# Patient Record
Sex: MALE | Race: WHITE | Employment: OTHER | URBAN - METROPOLITAN AREA
[De-identification: names, ages, dates, MRNs, and addresses within clinical notes are randomized per-mention and may not be internally consistent; named-entity substitution may affect disease eponyms.]

---

## 2018-03-26 ENCOUNTER — TELEPHONE (OUTPATIENT)
Dept: FAMILY MEDICINE CLINIC | Age: 58
End: 2018-03-26

## 2018-03-26 NOTE — LETTER
[address] [place] [address] Drive  [address] [facility] [address] [address]  Phone: [phone]  Fax: [phone]    Saskia Bay MD        March 26, 2018     Patient: Evert Jones   YOB: 1960   Date of Visit: 3/26/2018       To Whom It May Concern: It is my medical opinion that Evert Jones requires a disability parking placard for the following reasons:  He cannot walk without assistance from another person or the use of an assistance device (cane, crutch, prosthetic device, wheelchair, etc.). He has limited walking ability due to an arthritic and an orthopedic condition. Duration of need: 3 years    If you have any questions or concerns, please don't hesitate to call.     Sincerely,        Saskia Bay MD

## 2018-05-04 ENCOUNTER — OFFICE VISIT (OUTPATIENT)
Dept: FAMILY MEDICINE CLINIC | Age: 58
End: 2018-05-04
Payer: MEDICARE

## 2018-05-04 VITALS
TEMPERATURE: 98 F | DIASTOLIC BLOOD PRESSURE: 92 MMHG | WEIGHT: 220 LBS | HEIGHT: 75 IN | OXYGEN SATURATION: 95 % | BODY MASS INDEX: 27.35 KG/M2 | RESPIRATION RATE: 18 BRPM | HEART RATE: 88 BPM | SYSTOLIC BLOOD PRESSURE: 130 MMHG

## 2018-05-04 DIAGNOSIS — J01.90 ACUTE SINUSITIS, RECURRENCE NOT SPECIFIED, UNSPECIFIED LOCATION: ICD-10-CM

## 2018-05-04 DIAGNOSIS — Z76.0 MEDICATION REFILL: ICD-10-CM

## 2018-05-04 DIAGNOSIS — K12.2 UVULITIS: Primary | ICD-10-CM

## 2018-05-04 LAB — S PYO AG THROAT QL: NORMAL

## 2018-05-04 PROCEDURE — 4004F PT TOBACCO SCREEN RCVD TLK: CPT | Performed by: PHYSICIAN ASSISTANT

## 2018-05-04 PROCEDURE — 99213 OFFICE O/P EST LOW 20 MIN: CPT | Performed by: PHYSICIAN ASSISTANT

## 2018-05-04 PROCEDURE — G8427 DOCREV CUR MEDS BY ELIG CLIN: HCPCS | Performed by: PHYSICIAN ASSISTANT

## 2018-05-04 PROCEDURE — 87880 STREP A ASSAY W/OPTIC: CPT | Performed by: PHYSICIAN ASSISTANT

## 2018-05-04 PROCEDURE — G8417 CALC BMI ABV UP PARAM F/U: HCPCS | Performed by: PHYSICIAN ASSISTANT

## 2018-05-04 PROCEDURE — 3017F COLORECTAL CA SCREEN DOC REV: CPT | Performed by: PHYSICIAN ASSISTANT

## 2018-05-04 RX ORDER — PREDNISONE 10 MG/1
TABLET ORAL
Qty: 20 TABLET | Refills: 0 | Status: SHIPPED | OUTPATIENT
Start: 2018-05-04 | End: 2018-05-14

## 2018-05-04 RX ORDER — AMOXICILLIN AND CLAVULANATE POTASSIUM 875; 125 MG/1; MG/1
1 TABLET, FILM COATED ORAL 2 TIMES DAILY
Qty: 20 TABLET | Refills: 0 | Status: SHIPPED | OUTPATIENT
Start: 2018-05-04 | End: 2018-05-14

## 2018-05-04 RX ORDER — LANSOPRAZOLE 30 MG/1
30 CAPSULE, DELAYED RELEASE ORAL DAILY
Qty: 60 CAPSULE | Refills: 0 | Status: SHIPPED | OUTPATIENT
Start: 2018-05-04 | End: 2018-05-21 | Stop reason: SDUPTHER

## 2018-05-21 ENCOUNTER — OFFICE VISIT (OUTPATIENT)
Dept: FAMILY MEDICINE CLINIC | Age: 58
End: 2018-05-21
Payer: MEDICARE

## 2018-05-21 ENCOUNTER — HOSPITAL ENCOUNTER (OUTPATIENT)
Age: 58
Discharge: HOME | End: 2018-05-23
Payer: MEDICARE

## 2018-05-21 VITALS
BODY MASS INDEX: 27.87 KG/M2 | OXYGEN SATURATION: 97 % | TEMPERATURE: 97.7 F | WEIGHT: 223 LBS | SYSTOLIC BLOOD PRESSURE: 130 MMHG | HEART RATE: 80 BPM | DIASTOLIC BLOOD PRESSURE: 82 MMHG

## 2018-05-21 DIAGNOSIS — Z11.4 ENCOUNTER FOR SCREENING FOR HIV: ICD-10-CM

## 2018-05-21 DIAGNOSIS — E03.4 HYPOTHYROIDISM DUE TO ACQUIRED ATROPHY OF THYROID: ICD-10-CM

## 2018-05-21 DIAGNOSIS — N40.0 BENIGN PROSTATIC HYPERPLASIA WITHOUT LOWER URINARY TRACT SYMPTOMS: ICD-10-CM

## 2018-05-21 DIAGNOSIS — I10 ESSENTIAL HYPERTENSION: ICD-10-CM

## 2018-05-21 DIAGNOSIS — Z12.5 ENCOUNTER FOR SCREENING FOR MALIGNANT NEOPLASM OF PROSTATE: ICD-10-CM

## 2018-05-21 DIAGNOSIS — I10 ESSENTIAL HYPERTENSION: Primary | ICD-10-CM

## 2018-05-21 DIAGNOSIS — F41.9 ANXIETY: ICD-10-CM

## 2018-05-21 DIAGNOSIS — Z11.59 NEED FOR HEPATITIS C SCREENING TEST: ICD-10-CM

## 2018-05-21 DIAGNOSIS — J30.1 CHRONIC SEASONAL ALLERGIC RHINITIS DUE TO POLLEN: ICD-10-CM

## 2018-05-21 DIAGNOSIS — N39.43 DRIBBLING OF URINE: ICD-10-CM

## 2018-05-21 LAB
ALBUMIN SERPL-MCNC: 4.4 G/DL (ref 3.5–5.2)
ALP BLD-CCNC: 88 U/L (ref 40–129)
ALT SERPL-CCNC: 21 U/L (ref 0–40)
ANION GAP SERPL CALCULATED.3IONS-SCNC: 14 MMOL/L (ref 7–16)
AST SERPL-CCNC: 23 U/L (ref 0–39)
BASOPHILS ABSOLUTE: 0.04 E9/L (ref 0–0.2)
BASOPHILS RELATIVE PERCENT: 0.6 % (ref 0–2)
BILIRUB SERPL-MCNC: 0.4 MG/DL (ref 0–1.2)
BUN BLDV-MCNC: 18 MG/DL (ref 6–20)
CALCIUM SERPL-MCNC: 9.3 MG/DL (ref 8.6–10.2)
CHLORIDE BLD-SCNC: 104 MMOL/L (ref 98–107)
CHOLESTEROL, TOTAL: 170 MG/DL (ref 0–199)
CO2: 23 MMOL/L (ref 22–29)
CREAT SERPL-MCNC: 0.9 MG/DL (ref 0.7–1.2)
EOSINOPHILS ABSOLUTE: 0.1 E9/L (ref 0.05–0.5)
EOSINOPHILS RELATIVE PERCENT: 1.4 % (ref 0–6)
GFR AFRICAN AMERICAN: >60
GFR NON-AFRICAN AMERICAN: >60 ML/MIN/1.73
GLUCOSE BLD-MCNC: 96 MG/DL (ref 74–109)
HCT VFR BLD CALC: 45.2 % (ref 37–54)
HDLC SERPL-MCNC: 62 MG/DL
HEMOGLOBIN: 15.4 G/DL (ref 12.5–16.5)
IMMATURE GRANULOCYTES #: 0.04 E9/L
IMMATURE GRANULOCYTES %: 0.6 % (ref 0–5)
LDL CHOLESTEROL CALCULATED: 90 MG/DL (ref 0–99)
LYMPHOCYTES ABSOLUTE: 1.17 E9/L (ref 1.5–4)
LYMPHOCYTES RELATIVE PERCENT: 16.9 % (ref 20–42)
MCH RBC QN AUTO: 28.9 PG (ref 26–35)
MCHC RBC AUTO-ENTMCNC: 34.1 % (ref 32–34.5)
MCV RBC AUTO: 85 FL (ref 80–99.9)
MONOCYTES ABSOLUTE: 0.53 E9/L (ref 0.1–0.95)
MONOCYTES RELATIVE PERCENT: 7.7 % (ref 2–12)
NEUTROPHILS ABSOLUTE: 5.04 E9/L (ref 1.8–7.3)
NEUTROPHILS RELATIVE PERCENT: 72.8 % (ref 43–80)
PDW BLD-RTO: 14 FL (ref 11.5–15)
PLATELET # BLD: 187 E9/L (ref 130–450)
PMV BLD AUTO: 11.9 FL (ref 7–12)
POTASSIUM SERPL-SCNC: 4.7 MMOL/L (ref 3.5–5)
PROSTATE SPECIFIC ANTIGEN: 2.73 NG/ML (ref 0–4)
RBC # BLD: 5.32 E12/L (ref 3.8–5.8)
SODIUM BLD-SCNC: 141 MMOL/L (ref 132–146)
TOTAL PROTEIN: 7.4 G/DL (ref 6.4–8.3)
TRIGL SERPL-MCNC: 90 MG/DL (ref 0–149)
TSH SERPL DL<=0.05 MIU/L-ACNC: 4.66 UIU/ML (ref 0.27–4.2)
VLDLC SERPL CALC-MCNC: 18 MG/DL
WBC # BLD: 6.9 E9/L (ref 4.5–11.5)

## 2018-05-21 PROCEDURE — 84443 ASSAY THYROID STIM HORMONE: CPT

## 2018-05-21 PROCEDURE — 80061 LIPID PANEL: CPT

## 2018-05-21 PROCEDURE — 85025 COMPLETE CBC W/AUTO DIFF WBC: CPT

## 2018-05-21 PROCEDURE — G8417 CALC BMI ABV UP PARAM F/U: HCPCS | Performed by: FAMILY MEDICINE

## 2018-05-21 PROCEDURE — G8427 DOCREV CUR MEDS BY ELIG CLIN: HCPCS | Performed by: FAMILY MEDICINE

## 2018-05-21 PROCEDURE — 86703 HIV-1/HIV-2 1 RESULT ANTBDY: CPT

## 2018-05-21 PROCEDURE — 3017F COLORECTAL CA SCREEN DOC REV: CPT | Performed by: FAMILY MEDICINE

## 2018-05-21 PROCEDURE — 36415 COLL VENOUS BLD VENIPUNCTURE: CPT | Performed by: FAMILY MEDICINE

## 2018-05-21 PROCEDURE — 4004F PT TOBACCO SCREEN RCVD TLK: CPT | Performed by: FAMILY MEDICINE

## 2018-05-21 PROCEDURE — 99214 OFFICE O/P EST MOD 30 MIN: CPT | Performed by: FAMILY MEDICINE

## 2018-05-21 PROCEDURE — G0103 PSA SCREENING: HCPCS

## 2018-05-21 PROCEDURE — 80053 COMPREHEN METABOLIC PANEL: CPT

## 2018-05-21 PROCEDURE — 86803 HEPATITIS C AB TEST: CPT

## 2018-05-21 RX ORDER — LANSOPRAZOLE 15 MG/1
15 CAPSULE, DELAYED RELEASE ORAL DAILY
Qty: 90 CAPSULE | Refills: 1 | Status: SHIPPED | OUTPATIENT
Start: 2018-05-21 | End: 2018-12-04 | Stop reason: SDUPTHER

## 2018-05-21 RX ORDER — LEVOTHYROXINE SODIUM 112 UG/1
TABLET ORAL
Qty: 90 TABLET | Refills: 1 | Status: SHIPPED | OUTPATIENT
Start: 2018-05-21 | End: 2018-12-04 | Stop reason: SDUPTHER

## 2018-05-21 RX ORDER — FLUTICASONE PROPIONATE 50 MCG
1 SPRAY, SUSPENSION (ML) NASAL DAILY
Qty: 3 BOTTLE | Refills: 0 | Status: SHIPPED | OUTPATIENT
Start: 2018-05-21 | End: 2019-07-22 | Stop reason: SDUPTHER

## 2018-05-21 RX ORDER — PRAVASTATIN SODIUM 20 MG
TABLET ORAL
Qty: 90 TABLET | Refills: 1 | Status: SHIPPED | OUTPATIENT
Start: 2018-05-21 | End: 2018-12-04 | Stop reason: SDUPTHER

## 2018-05-21 RX ORDER — TAMSULOSIN HYDROCHLORIDE 0.4 MG/1
CAPSULE ORAL
Qty: 90 CAPSULE | Refills: 1 | Status: SHIPPED | OUTPATIENT
Start: 2018-05-21 | End: 2018-12-04 | Stop reason: SDUPTHER

## 2018-05-21 RX ORDER — LISINOPRIL 30 MG/1
TABLET ORAL
Qty: 90 TABLET | Refills: 1 | Status: SHIPPED | OUTPATIENT
Start: 2018-05-21 | End: 2018-12-04 | Stop reason: SDUPTHER

## 2018-05-22 DIAGNOSIS — R76.8 POSITIVE HEPATITIS C ANTIBODY TEST: Primary | ICD-10-CM

## 2018-05-22 LAB
HEPATITIS C ANTIBODY INTERPRETATION: REACTIVE
HIV-1 AND HIV-2 ANTIBODIES: NORMAL

## 2018-05-25 ENCOUNTER — NURSE ONLY (OUTPATIENT)
Dept: FAMILY MEDICINE CLINIC | Age: 58
End: 2018-05-25
Payer: MEDICARE

## 2018-05-25 ENCOUNTER — HOSPITAL ENCOUNTER (OUTPATIENT)
Age: 58
Discharge: HOME | End: 2018-05-27
Payer: MEDICARE

## 2018-05-25 DIAGNOSIS — R76.8 POSITIVE HEPATITIS C ANTIBODY TEST: Primary | ICD-10-CM

## 2018-05-25 DIAGNOSIS — R76.8 POSITIVE HEPATITIS C ANTIBODY TEST: ICD-10-CM

## 2018-05-25 PROCEDURE — 87522 HEPATITIS C REVRS TRNSCRPJ: CPT

## 2018-05-25 PROCEDURE — 36415 COLL VENOUS BLD VENIPUNCTURE: CPT | Performed by: FAMILY MEDICINE

## 2018-05-28 LAB
EER HCV RNA QNT PCR: NORMAL
HCV RNA QNT REAL-TIME PCR INTERP: NOT DETECTED
HCV RNA, QUANTITATIVE REAL TIME PCR: <1.2 LOG IU
HEPATITIS C RNA PCR QUANT: <15 IU/ML

## 2018-11-20 ENCOUNTER — OFFICE VISIT (OUTPATIENT)
Dept: FAMILY MEDICINE CLINIC | Age: 58
End: 2018-11-20
Payer: MEDICARE

## 2018-11-20 VITALS
DIASTOLIC BLOOD PRESSURE: 84 MMHG | TEMPERATURE: 97.7 F | SYSTOLIC BLOOD PRESSURE: 120 MMHG | HEIGHT: 73 IN | HEART RATE: 91 BPM | BODY MASS INDEX: 29.03 KG/M2 | OXYGEN SATURATION: 98 % | WEIGHT: 219 LBS

## 2018-11-20 DIAGNOSIS — F33.0 MILD EPISODE OF RECURRENT MAJOR DEPRESSIVE DISORDER (HCC): ICD-10-CM

## 2018-11-20 DIAGNOSIS — I10 ESSENTIAL HYPERTENSION: ICD-10-CM

## 2018-11-20 DIAGNOSIS — M48.02 FORAMINAL STENOSIS OF CERVICAL REGION: Primary | ICD-10-CM

## 2018-11-20 DIAGNOSIS — Z13.1 DIABETES MELLITUS SCREENING: ICD-10-CM

## 2018-11-20 DIAGNOSIS — F17.210 CIGARETTE NICOTINE DEPENDENCE WITHOUT COMPLICATION: ICD-10-CM

## 2018-11-20 DIAGNOSIS — Z13.31 POSITIVE DEPRESSION SCREENING: ICD-10-CM

## 2018-11-20 PROCEDURE — G0009 ADMIN PNEUMOCOCCAL VACCINE: HCPCS | Performed by: FAMILY MEDICINE

## 2018-11-20 PROCEDURE — 3017F COLORECTAL CA SCREEN DOC REV: CPT | Performed by: FAMILY MEDICINE

## 2018-11-20 PROCEDURE — 4004F PT TOBACCO SCREEN RCVD TLK: CPT | Performed by: FAMILY MEDICINE

## 2018-11-20 PROCEDURE — G8417 CALC BMI ABV UP PARAM F/U: HCPCS | Performed by: FAMILY MEDICINE

## 2018-11-20 PROCEDURE — G8427 DOCREV CUR MEDS BY ELIG CLIN: HCPCS | Performed by: FAMILY MEDICINE

## 2018-11-20 PROCEDURE — 90732 PPSV23 VACC 2 YRS+ SUBQ/IM: CPT | Performed by: FAMILY MEDICINE

## 2018-11-20 PROCEDURE — G8431 POS CLIN DEPRES SCRN F/U DOC: HCPCS | Performed by: FAMILY MEDICINE

## 2018-11-20 PROCEDURE — G8484 FLU IMMUNIZE NO ADMIN: HCPCS | Performed by: FAMILY MEDICINE

## 2018-11-20 PROCEDURE — 99214 OFFICE O/P EST MOD 30 MIN: CPT | Performed by: FAMILY MEDICINE

## 2018-11-20 ASSESSMENT — PATIENT HEALTH QUESTIONNAIRE - PHQ9
SUM OF ALL RESPONSES TO PHQ QUESTIONS 1-9: 13
8. MOVING OR SPEAKING SO SLOWLY THAT OTHER PEOPLE COULD HAVE NOTICED. OR THE OPPOSITE, BEING SO FIGETY OR RESTLESS THAT YOU HAVE BEEN MOVING AROUND A LOT MORE THAN USUAL: 2
SUM OF ALL RESPONSES TO PHQ9 QUESTIONS 1 & 2: 5
SUM OF ALL RESPONSES TO PHQ QUESTIONS 1-9: 13
10. IF YOU CHECKED OFF ANY PROBLEMS, HOW DIFFICULT HAVE THESE PROBLEMS MADE IT FOR YOU TO DO YOUR WORK, TAKE CARE OF THINGS AT HOME, OR GET ALONG WITH OTHER PEOPLE: 1
7. TROUBLE CONCENTRATING ON THINGS, SUCH AS READING THE NEWSPAPER OR WATCHING TELEVISION: 2
2. FEELING DOWN, DEPRESSED OR HOPELESS: 3
6. FEELING BAD ABOUT YOURSELF - OR THAT YOU ARE A FAILURE OR HAVE LET YOURSELF OR YOUR FAMILY DOWN: 0
5. POOR APPETITE OR OVEREATING: 0
4. FEELING TIRED OR HAVING LITTLE ENERGY: 2
3. TROUBLE FALLING OR STAYING ASLEEP: 2
1. LITTLE INTEREST OR PLEASURE IN DOING THINGS: 2
9. THOUGHTS THAT YOU WOULD BE BETTER OFF DEAD, OR OF HURTING YOURSELF: 0

## 2018-11-20 NOTE — PATIENT INSTRUCTIONS
Patient Education          pneumococcal polysaccharides vaccine (PPSV), 23-valent  Pronunciation:  KACI ARCINIEGA al TINO ee TIBURCIO mishra 23-SYLVIA treadwell  Brand:  Pneumovax 23  What is the most important information I should know about this vaccine? PPSV should be given at least 2 weeks before the start of any treatment that can weaken your immune system. PPSV is also given at least 2 weeks before you undergo a splenectomy (surgical removal of the spleen). The timing of this vaccination is very important for it to be effective. Follow your doctor's instructions. You can still receive a vaccine if you have a cold or fever. In the case of a more severe illness with a fever or any type of infection, wait until you get better before receiving this vaccine. You should not receive a booster vaccine if you had a life-threatening allergic reaction after the first shot. Keep track of any and all side effects you have after receiving this vaccine. If you ever need to receive a booster dose, you will need to tell your doctor if the previous shot caused any side effects. Becoming infected with pneumococcal disease (such as pneumonia or meningitis) is much more dangerous to your health than receiving this vaccine. However, like any medicine, this vaccine can cause side effects but the risk of serious side effects is extremely low. What is pneumococcal polysaccharides vaccine (PPSV)? Pneumococcal disease is a serious infection caused by a bacteria. Pneumococcal bacteria can infect the sinuses and inner ear. It can also infect the lungs, blood, and brain and these conditions can be fatal.  Pneumococcal polysaccharides vaccine (PPSV) is used to prevent infection caused by pneumococcal bacteria. PPSV contains 23 of the most common types of pneumococcal bacteria. PPSV works by exposing you to a small dose of the bacteria or a protein from the bacteria, which causes your body to develop immunity to the disease.  PPSV will not treat known whether PPSV passes into breast milk or if it could harm a nursing baby. Do not use this medication without telling your doctor if you are breast-feeding a baby. How is this vaccine given? PPSV is given as an injection (shot) under the skin or into a muscle of your arm or thigh. You will receive this injection in a doctor's office or other clinic setting. PPSV is usually given as a routine vaccination in adults who are 72 years and older. PPSV may also be given to people between the ages 3and 59years old who have:  · heart disease, lung disease, or diabetes;  · a cerebrospinal fluid leak, or a cochlear implant (an electronic hearing device);  · alcoholism or liver disease (including cirrhosis);  · sickle cell disease or a disorder of the spleen;  · a weak immune system caused by HIV, AIDS, cancer, kidney failure, organ transplantation, or a damaged spleen; or  · a weak immune system caused by taking steroids or receiving chemotherapy or radiation treatment. PPSV may also be given to people between the ages 23and 59years old who smoke or have asthma. PPSV should be given at least 2 weeks before the start of any treatment that can weaken your immune system. PPSV is also given at least 2 weeks before you undergo a splenectomy (surgical removal of the spleen). The timing of this vaccination is very important for it to be effective. Follow your doctor's instructions. Your doctor may recommend treating fever and pain with an aspirin-free pain reliever such as acetaminophen (Tylenol) or ibuprofen (Motrin, Advil, and others) when the shot is given and for the next 24 hours. Follow the label directions or your doctor's instructions about how much of this medicine to take. If your doctor has prescribed an antibiotic (such as penicillin) to help prevent infection with pneumococcal bacteria, do not stop using the antibiotic after you receive the PPSV.  Take the antibiotic for the entire length of time or adverse effects. If you have questions about the drugs you are taking, check with your doctor, nurse or pharmacist.  Copyright 3288-4973 [address]. Version: 5.02. Revision date: 10/17/2012. Care instructions adapted under license by [facility] ([facility]). If you have questions about a medical condition or this instruction, always ask your healthcare professional. [place] [address] any warranty or liability for your use of this information.

## 2018-12-04 ENCOUNTER — TELEPHONE (OUTPATIENT)
Dept: FAMILY MEDICINE CLINIC | Age: 58
End: 2018-12-04

## 2018-12-04 ENCOUNTER — APPOINTMENT (OUTPATIENT)
Dept: GENERAL RADIOLOGY | Age: 58
End: 2018-12-04
Payer: MEDICARE

## 2018-12-04 ENCOUNTER — APPOINTMENT (OUTPATIENT)
Dept: CT IMAGING | Age: 58
End: 2018-12-04
Payer: MEDICARE

## 2018-12-04 ENCOUNTER — HOSPITAL ENCOUNTER (EMERGENCY)
Age: 58
Discharge: HOME | End: 2018-12-04
Attending: EMERGENCY MEDICINE
Payer: MEDICARE

## 2018-12-04 VITALS
RESPIRATION RATE: 16 BRPM | SYSTOLIC BLOOD PRESSURE: 135 MMHG | OXYGEN SATURATION: 95 % | HEIGHT: 73 IN | DIASTOLIC BLOOD PRESSURE: 83 MMHG | HEART RATE: 77 BPM | BODY MASS INDEX: 28.63 KG/M2 | WEIGHT: 216 LBS | TEMPERATURE: 97.7 F

## 2018-12-04 DIAGNOSIS — I10 ESSENTIAL HYPERTENSION: ICD-10-CM

## 2018-12-04 DIAGNOSIS — S09.90XA INJURY OF HEAD, INITIAL ENCOUNTER: Primary | ICD-10-CM

## 2018-12-04 DIAGNOSIS — E03.4 HYPOTHYROIDISM DUE TO ACQUIRED ATROPHY OF THYROID: ICD-10-CM
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
## 2018-12-04 DIAGNOSIS — S22.32XA CLOSED FRACTURE OF ONE RIB OF LEFT SIDE, INITIAL ENCOUNTER: ICD-10-CM

## 2018-12-04 PROCEDURE — 72131 CT LUMBAR SPINE W/O DYE: CPT

## 2018-12-04 PROCEDURE — 99284 EMERGENCY DEPT VISIT MOD MDM: CPT

## 2018-12-04 PROCEDURE — 71101 X-RAY EXAM UNILAT RIBS/CHEST: CPT

## 2018-12-04 PROCEDURE — 70450 CT HEAD/BRAIN W/O DYE: CPT

## 2018-12-04 PROCEDURE — 6370000000 HC RX 637 (ALT 250 FOR IP): Performed by: EMERGENCY MEDICINE

## 2018-12-04 PROCEDURE — 72125 CT NECK SPINE W/O DYE: CPT

## 2018-12-04 PROCEDURE — 6360000002 HC RX W HCPCS: Performed by: EMERGENCY MEDICINE

## 2018-12-04 RX ORDER — ONDANSETRON 4 MG/1
4 TABLET, ORALLY DISINTEGRATING ORAL ONCE
Status: COMPLETED | OUTPATIENT
Start: 2018-12-04 | End: 2018-12-04

## 2018-12-04 RX ORDER — OXYCODONE HYDROCHLORIDE AND ACETAMINOPHEN 5; 325 MG/1; MG/1
1 TABLET ORAL ONCE
Status: COMPLETED | OUTPATIENT
Start: 2018-12-04 | End: 2018-12-04

## 2018-12-04 RX ORDER — LIDOCAINE 4 G/G
1 PATCH TOPICAL DAILY
Status: DISCONTINUED | OUTPATIENT
Start: 2018-12-04 | End: 2018-12-04 | Stop reason: HOSPADM

## 2018-12-04 RX ORDER — LIDOCAINE 50 MG/G
1 PATCH TOPICAL DAILY
Qty: 15 PATCH | Refills: 0 | Status: SHIPPED | OUTPATIENT
Start: 2018-12-04 | End: 2019-01-03

## 2018-12-04 RX ORDER — OXYCODONE HYDROCHLORIDE AND ACETAMINOPHEN 5; 325 MG/1; MG/1
1 TABLET ORAL EVERY 6 HOURS PRN
Qty: 12 TABLET | Refills: 0 | Status: SHIPPED | OUTPATIENT
Start: 2018-12-04 | End: 2018-12-07

## 2018-12-04 RX ORDER — ONDANSETRON 4 MG/1
4 TABLET, ORALLY DISINTEGRATING ORAL EVERY 8 HOURS PRN
Qty: 10 TABLET | Refills: 0 | Status: SHIPPED | OUTPATIENT
Start: 2018-12-04 | End: 2019-12-04

## 2018-12-04 RX ADMIN — ONDANSETRON 4 MG: 4 TABLET, ORALLY DISINTEGRATING ORAL at 12:10

## 2018-12-04 RX ADMIN — OXYCODONE HYDROCHLORIDE AND ACETAMINOPHEN 1 TABLET: 5; 325 TABLET ORAL at 12:10

## 2018-12-04 ASSESSMENT — PAIN DESCRIPTION - LOCATION
LOCATION: BACK;HEAD
LOCATION: BACK;HEAD
LOCATION_2: BACK
LOCATION: RIB CAGE

## 2018-12-04 ASSESSMENT — PAIN DESCRIPTION - PAIN TYPE
TYPE: ACUTE PAIN

## 2018-12-04 ASSESSMENT — PAIN SCALES - GENERAL
PAINLEVEL_OUTOF10: 6
PAINLEVEL_OUTOF10: 9

## 2018-12-04 NOTE — ED PROVIDER NOTES
allergies. -------------------------------------------------- RESULTS -------------------------------------------------  All laboratory and radiology results have been personally reviewed by myself   LABS:  No results found for this visit on 12/04/18. RADIOLOGY:  Interpreted by Radiologist.  CT Cervical Spine WO Contrast   Final Result   No evidence of fracture or dislocation of cervical spine. There is   mild diffuse degenerative changes from C3 to T1 with osteophytes and   multilevel disc bulges. Previous anterior spinal fusion at C5-C6 is   noted. CT Lumbar Spine WO Contrast   Final Result   No acute fracture. There is diffuse degenerative changes in the lumbar   spine. At L4-5, there is a central and right paracentral disc herniation with   effacement of the right lateral recess, right neural foramina, and   effacement of right L4 nerve root sheath. There is mild central disc   bulge at L5-S1 as well. CT Head WO Contrast   Final Result   Negative noncontrast CT study. Specifically, there is no evidence of intracranial hemorrhage or mass   effect, and no calvarial traumatic findings are noted. CT       XR RIBS LEFT INCLUDE CHEST (MIN 3 VIEWS)   Final Result   Findings suspicious for left eighth rib fracture.           ------------------------- NURSING NOTES AND VITALS REVIEWED ---------------------------   The nursing notes within the ED encounter and vital signs as below have been reviewed.    BP (!) 149/87   Pulse 78   Temp 97.7 °F (36.5 °C) (Oral)   Resp 16   Ht 6' 1\" (1.854 m)   Wt 216 lb (98 kg)   SpO2 95%   BMI 28.50 kg/m²   Oxygen Saturation Interpretation: Normal      ---------------------------------------------------PHYSICAL EXAM--------------------------------------      Constitutional/General: Alert and oriented x3, well appearing, non toxic in NAD  Head: NC/AT  Eyes: PERRL, EOMI  Mouth: Oropharynx clear, handling secretions, no

## 2018-12-06 RX ORDER — TAMSULOSIN HYDROCHLORIDE 0.4 MG/1
CAPSULE ORAL
Qty: 90 CAPSULE | Refills: 1 | Status: SHIPPED | OUTPATIENT
Start: 2018-12-06 | End: 2019-06-27 | Stop reason: SDUPTHER

## 2018-12-06 RX ORDER — LANSOPRAZOLE 15 MG/1
CAPSULE, DELAYED RELEASE ORAL
Qty: 90 CAPSULE | Refills: 1 | Status: SHIPPED | OUTPATIENT
Start: 2018-12-06 | End: 2019-06-27 | Stop reason: SDUPTHER

## 2018-12-06 RX ORDER — LEVOTHYROXINE SODIUM 112 UG/1
TABLET ORAL
Qty: 90 TABLET | Refills: 1 | Status: SHIPPED | OUTPATIENT
Start: 2018-12-06 | End: 2019-06-27 | Stop reason: SDUPTHER

## 2018-12-06 RX ORDER — LISINOPRIL 30 MG/1
TABLET ORAL
Qty: 90 TABLET | Refills: 1 | Status: SHIPPED | OUTPATIENT
Start: 2018-12-06 | End: 2019-04-22

## 2018-12-06 RX ORDER — PRAVASTATIN SODIUM 20 MG
TABLET ORAL
Qty: 90 TABLET | Refills: 1 | Status: SHIPPED | OUTPATIENT
Start: 2018-12-06 | End: 2019-06-27 | Stop reason: SDUPTHER
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
## 2019-04-01 ENCOUNTER — TELEPHONE (OUTPATIENT)
Dept: FAMILY MEDICINE CLINIC | Age: 59
End: 2019-04-01

## 2019-04-01 NOTE — TELEPHONE ENCOUNTER
Patient phoned c/o coughing, cold, fever, chills, nausea and throwing up - patient states he thinks he has the flu or pneumonia. Advised patient- [facility] Offered patient Tuesday appt at 4:20pm, he declined and states he will go to [facility] in [place].

## 2019-04-04 ENCOUNTER — TELEPHONE (OUTPATIENT)
Dept: ENT CLINIC | Age: 59
End: 2019-04-04

## 2019-04-04 NOTE — TELEPHONE ENCOUNTER
Patient is scheduled on 06-19-19 for bilateral ear cerumen, states he was seen in [facility] at a [facility]) facility. Patient states he used to see Dr. Pino Diaz and is ears are abnormal (they go a different way then their suppose to). Patient requested to be added to the waitlist, can be seen at either location. Patient can be reached at [phone].

## 2019-04-22 ENCOUNTER — TELEPHONE (OUTPATIENT)
Dept: FAMILY MEDICINE CLINIC | Age: 59
End: 2019-04-22

## 2019-04-22 ENCOUNTER — OFFICE VISIT (OUTPATIENT)
Dept: ENT CLINIC | Age: 59
End: 2019-04-22
Payer: MEDICARE

## 2019-04-22 VITALS
WEIGHT: 217 LBS | DIASTOLIC BLOOD PRESSURE: 90 MMHG | BODY MASS INDEX: 27.85 KG/M2 | HEIGHT: 74 IN | SYSTOLIC BLOOD PRESSURE: 139 MMHG | HEART RATE: 69 BPM

## 2019-04-22 DIAGNOSIS — H61.23 BILATERAL IMPACTED CERUMEN: Primary | ICD-10-CM

## 2019-04-22 DIAGNOSIS — J30.1 SEASONAL ALLERGIC RHINITIS DUE TO POLLEN: ICD-10-CM

## 2019-04-22 PROCEDURE — 4004F PT TOBACCO SCREEN RCVD TLK: CPT | Performed by: OTOLARYNGOLOGY

## 2019-04-22 PROCEDURE — G8417 CALC BMI ABV UP PARAM F/U: HCPCS | Performed by: OTOLARYNGOLOGY

## 2019-04-22 PROCEDURE — 99203 OFFICE O/P NEW LOW 30 MIN: CPT | Performed by: OTOLARYNGOLOGY

## 2019-04-22 PROCEDURE — G8427 DOCREV CUR MEDS BY ELIG CLIN: HCPCS | Performed by: OTOLARYNGOLOGY

## 2019-04-22 PROCEDURE — 3017F COLORECTAL CA SCREEN DOC REV: CPT | Performed by: OTOLARYNGOLOGY

## 2019-04-22 RX ORDER — AMLODIPINE BESYLATE 5 MG/1
5 TABLET ORAL DAILY
Qty: 30 TABLET | Refills: 5 | Status: SHIPPED | OUTPATIENT
Start: 2019-04-22 | End: 2019-11-07 | Stop reason: SDUPTHER

## 2019-04-22 RX ORDER — AZELASTINE 1 MG/ML
1 SPRAY, METERED NASAL 2 TIMES DAILY
Qty: 2 BOTTLE | Refills: 1 | Status: SHIPPED | OUTPATIENT
Start: 2019-04-22 | End: 2019-07-22 | Stop reason: SDUPTHER

## 2019-04-22 ASSESSMENT — ENCOUNTER SYMPTOMS
VOMITING: 0
NAUSEA: 0
SHORTNESS OF BREATH: 0
COLOR CHANGE: 0
EYE REDNESS: 0
COUGH: 0
RHINORRHEA: 1
STRIDOR: 0
EYE DISCHARGE: 0
ALLERGIC/IMMUNOLOGIC NEGATIVE: 1

## 2019-04-22 NOTE — PROGRESS NOTES
Subjective:     Patient ID:  Sajan Mccarty is a 63 y.o. male. HPI:    Cerumen Impaction/Allergic Rhinitis   Patient presents withdiminished hearing both ears for the past 4 years. There is a prior history of cerumen impaction. The patient was notusing ear drops to loosen wax immediately prior to this visit. Patient's medications, allergies, past medical, surgical, socialand family histories were reviewed and updated as appropriate. Last ear cleaning was 4 years ago with Dr. Teri Vasques. Also notes allergies, worse in the spring, currently on an oral antihistamine and Flonase. He notes post-nasal drainage, rhinitis, an watery eyes. Never been formally allergy tested   He also notes uvula swelling that is intermittent, he doesn't think he snores at night, is a smoker. Review of Systems   Constitutional: Negative for chills, fatigue and fever. HENT: Positive for hearing loss, postnasal drip and rhinorrhea. Negative for ear discharge, ear pain and tinnitus. Eyes: Negative for discharge and redness. Respiratory: Negative for cough, shortness of breath and stridor. Gastrointestinal: Negative for nausea and vomiting. Endocrine: Negative. Genitourinary: Negative. Musculoskeletal: Negative. Skin: Negative for color change and rash. Allergic/Immunologic: Negative. Neurological: Negative for dizziness, speech difficulty and headaches. Hematological: Negative. Psychiatric/Behavioral: Negative for agitation and confusion. All other systems reviewed and are negative. Objective:   Physical Exam   Constitutional: He is oriented to person, place, and time. He appears well-developed and well-nourished. HENT:   Head: Normocephalic and atraumatic. Right Ear: Tympanic membrane, external ear and ear canal normal.   Left Ear: Tympanic membrane, external ear and ear canal normal.   Ears:    Nose: Mucosal edema and rhinorrhea present.    Mouth/Throat: Oropharynx is clear and moist. Eyes: Pupils are equal, round, and reactive to light. Neck: Normal range of motion. Cardiovascular: Normal rate. Pulmonary/Chest: Effort normal.   Musculoskeletal: Normal range of motion. Neurological: He is alert and oriented to person, place, and time. Skin: Skin is warm and dry. Cerumen removal     Auditory canal(s) both earscompletely obstructed with cerumen. A microscope was used due to deep impaction of the cerumen. Cerumen was gently removed using soft plastic curette, suction. Tympanic membranes are intact following the procedure. Auditory canals appear normal.                      Assessment:       Diagnosis Orders   1. Bilateral impacted cerumen     2. Seasonal allergic rhinitis due to pollen               Plan:     Cerumen impaction   Will follow up with patient in 3 months the patient has further issues. Discussed H2O2 and irrigation bi-weekly formaintenance. Allergic rhinitis- Astelin to current Flonase and oral antihistamine regiment     Uvula swelling- pt likely snoring, may need surgery in the future. Recommend to discontinue Lisinopril as it may be contributing- angioedema. Pt to call PCP and switch meds. Electronically signed by Merly Pompa DO on 4/22/2019 at 10:15 AM                  Ignacio Goddard  1960      I have discussed the case, including pertinent history and exam findings with the resident. I have seen and examined the patient and the key elements of the encounter have been performed by me. I agree with the assessment, plan and orders as documented by the resident. Patient here for follow up of medical problems. Remainder of medical problems as per resident note.       1635 Alejo Underwood DO  5/4/19

## 2019-05-20 ENCOUNTER — TELEPHONE (OUTPATIENT)
Dept: FAMILY MEDICINE CLINIC | Age: 59
End: 2019-05-20

## 2019-05-20 DIAGNOSIS — E03.4 HYPOTHYROIDISM DUE TO ACQUIRED ATROPHY OF THYROID: Primary | ICD-10-CM

## 2019-05-20 DIAGNOSIS — Z12.5 SCREENING FOR MALIGNANT NEOPLASM OF PROSTATE: ICD-10-CM

## 2019-05-20 NOTE — TELEPHONE ENCOUNTER
Patient phoned, he missed his appt this am. Rescheduled patient for end of June. He would like to get his fasting labs done soon, if  would please place orders, he will have them done at [facility] because it is closer for him.

## 2019-06-19 ENCOUNTER — APPOINTMENT (OUTPATIENT)
Dept: GENERAL RADIOLOGY | Age: 59
DRG: 603 | End: 2019-06-19
Payer: MEDICARE

## 2019-06-19 ENCOUNTER — HOSPITAL ENCOUNTER (INPATIENT)
Age: 59
LOS: 2 days | Discharge: HOME | DRG: 603 | End: 2019-06-21
Attending: EMERGENCY MEDICINE | Admitting: INTERNAL MEDICINE
Payer: MEDICARE

## 2019-06-19 DIAGNOSIS — R10.31 RIGHT LOWER QUADRANT ABDOMINAL PAIN: ICD-10-CM

## 2019-06-19 DIAGNOSIS — T78.3XXA ANGIOEDEMA, INITIAL ENCOUNTER: Primary | ICD-10-CM

## 2019-06-19 PROBLEM — K02.9 DENTAL CARIES: Status: ACTIVE | Noted: 2019-06-19

## 2019-06-19 PROBLEM — F17.200 TOBACCO DEPENDENCE: Status: ACTIVE | Noted: 2019-06-19

## 2019-06-19 PROBLEM — R97.20 ELEVATED PSA: Status: ACTIVE | Noted: 2019-06-19

## 2019-06-19 PROBLEM — R10.9 ABDOMINAL PAIN: Status: ACTIVE | Noted: 2019-06-19

## 2019-06-19 PROBLEM — F10.20 ALCOHOL DEPENDENCE (HCC): Status: ACTIVE | Noted: 2019-06-19

## 2019-06-19 PROBLEM — R00.1 BRADYCARDIA: Status: ACTIVE | Noted: 2019-06-19

## 2019-06-19 PROBLEM — E78.5 HLD (HYPERLIPIDEMIA): Status: ACTIVE | Noted: 2019-06-19

## 2019-06-19 LAB
ABO/RH: NORMAL
ALBUMIN SERPL-MCNC: 4.3 G/DL (ref 3.5–5.2)
ALP BLD-CCNC: 80 U/L (ref 40–129)
ALT SERPL-CCNC: 10 U/L (ref 0–40)
ANION GAP SERPL CALCULATED.3IONS-SCNC: 14 MMOL/L (ref 7–16)
ANTIBODY SCREEN: NORMAL
AST SERPL-CCNC: 18 U/L (ref 0–39)
BASOPHILS ABSOLUTE: 0.05 E9/L (ref 0–0.2)
BASOPHILS RELATIVE PERCENT: 0.6 % (ref 0–2)
BILIRUB SERPL-MCNC: 0.7 MG/DL (ref 0–1.2)
BILIRUBIN URINE: NEGATIVE
BLOOD, URINE: NEGATIVE
BUN BLDV-MCNC: 20 MG/DL (ref 6–20)
C-REACTIVE PROTEIN: 1.5 MG/DL (ref 0–0.4)
CALCIUM SERPL-MCNC: 9.3 MG/DL (ref 8.6–10.2)
CHLORIDE BLD-SCNC: 103 MMOL/L (ref 98–107)
CLARITY: CLEAR
CO2: 24 MMOL/L (ref 22–29)
COLOR: YELLOW
CREAT SERPL-MCNC: 1.2 MG/DL (ref 0.7–1.2)
D DIMER: 820 NG/ML DDU
EOSINOPHILS ABSOLUTE: 0.18 E9/L (ref 0.05–0.5)
EOSINOPHILS RELATIVE PERCENT: 2 % (ref 0–6)
GFR AFRICAN AMERICAN: >60
GFR NON-AFRICAN AMERICAN: >60 ML/MIN/1.73
GLUCOSE BLD-MCNC: 75 MG/DL (ref 74–99)
GLUCOSE URINE: NEGATIVE MG/DL
HCT VFR BLD CALC: 43.5 % (ref 37–54)
HEMOGLOBIN: 15.1 G/DL (ref 12.5–16.5)
IMMATURE GRANULOCYTES #: 0.05 E9/L
IMMATURE GRANULOCYTES %: 0.6 % (ref 0–5)
KETONES, URINE: 15 MG/DL
LEUKOCYTE ESTERASE, URINE: NEGATIVE
LIPASE: 6 U/L (ref 13–60)
LYMPHOCYTES ABSOLUTE: 1.54 E9/L (ref 1.5–4)
LYMPHOCYTES RELATIVE PERCENT: 17.2 % (ref 20–42)
MCH RBC QN AUTO: 29.2 PG (ref 26–35)
MCHC RBC AUTO-ENTMCNC: 34.7 % (ref 32–34.5)
MCV RBC AUTO: 84 FL (ref 80–99.9)
MONOCYTES ABSOLUTE: 0.56 E9/L (ref 0.1–0.95)
MONOCYTES RELATIVE PERCENT: 6.3 % (ref 2–12)
NEUTROPHILS ABSOLUTE: 6.55 E9/L (ref 1.8–7.3)
NEUTROPHILS RELATIVE PERCENT: 73.3 % (ref 43–80)
NITRITE, URINE: NEGATIVE
PDW BLD-RTO: 13.8 FL (ref 11.5–15)
PH UA: 5.5 (ref 5–9)
PLATELET # BLD: 196 E9/L (ref 130–450)
PMV BLD AUTO: 12.4 FL (ref 7–12)
POTASSIUM REFLEX MAGNESIUM: 4.6 MMOL/L (ref 3.5–5)
PROSTATE SPECIFIC ANTIGEN: 1.48 NG/ML (ref 0–4)
PROTEIN UA: NEGATIVE MG/DL
RBC # BLD: 5.18 E12/L (ref 3.8–5.8)
SODIUM BLD-SCNC: 141 MMOL/L (ref 132–146)
SPECIFIC GRAVITY UA: 1.01 (ref 1–1.03)
T4 FREE: 1.1 NG/DL (ref 0.93–1.7)
TOTAL PROTEIN: 7.1 G/DL (ref 6.4–8.3)
TROPONIN: <0.01 NG/ML (ref 0–0.03)
TSH SERPL DL<=0.05 MIU/L-ACNC: 1.05 UIU/ML (ref 0.27–4.2)
UROBILINOGEN, URINE: 0.2 E.U./DL
WBC # BLD: 8.9 E9/L (ref 4.5–11.5)

## 2019-06-19 PROCEDURE — 81003 URINALYSIS AUTO W/O SCOPE: CPT

## 2019-06-19 PROCEDURE — 71045 X-RAY EXAM CHEST 1 VIEW: CPT

## 2019-06-19 PROCEDURE — 85651 RBC SED RATE NONAUTOMATED: CPT

## 2019-06-19 PROCEDURE — 36415 COLL VENOUS BLD VENIPUNCTURE: CPT

## 2019-06-19 PROCEDURE — 87081 CULTURE SCREEN ONLY: CPT

## 2019-06-19 PROCEDURE — 2580000003 HC RX 258: Performed by: PHYSICIAN ASSISTANT

## 2019-06-19 PROCEDURE — 93005 ELECTROCARDIOGRAM TRACING: CPT | Performed by: EMERGENCY MEDICINE

## 2019-06-19 PROCEDURE — 96375 TX/PRO/DX INJ NEW DRUG ADDON: CPT

## 2019-06-19 PROCEDURE — 83690 ASSAY OF LIPASE: CPT

## 2019-06-19 PROCEDURE — 82103 ALPHA-1-ANTITRYPSIN TOTAL: CPT

## 2019-06-19 PROCEDURE — 85025 COMPLETE CBC W/AUTO DIFF WBC: CPT

## 2019-06-19 PROCEDURE — 85378 FIBRIN DEGRADE SEMIQUANT: CPT

## 2019-06-19 PROCEDURE — G0103 PSA SCREENING: HCPCS

## 2019-06-19 PROCEDURE — 84443 ASSAY THYROID STIM HORMONE: CPT

## 2019-06-19 PROCEDURE — 84484 ASSAY OF TROPONIN QUANT: CPT

## 2019-06-19 PROCEDURE — 99285 EMERGENCY DEPT VISIT HI MDM: CPT

## 2019-06-19 PROCEDURE — 6360000002 HC RX W HCPCS: Performed by: EMERGENCY MEDICINE

## 2019-06-19 PROCEDURE — 80053 COMPREHEN METABOLIC PANEL: CPT

## 2019-06-19 PROCEDURE — 86140 C-REACTIVE PROTEIN: CPT

## 2019-06-19 PROCEDURE — 2000000000 HC ICU R&B

## 2019-06-19 PROCEDURE — G0378 HOSPITAL OBSERVATION PER HR: HCPCS

## 2019-06-19 PROCEDURE — 2500000003 HC RX 250 WO HCPCS: Performed by: PHYSICIAN ASSISTANT

## 2019-06-19 PROCEDURE — 86901 BLOOD TYPING SEROLOGIC RH(D): CPT

## 2019-06-19 PROCEDURE — 86850 RBC ANTIBODY SCREEN: CPT

## 2019-06-19 PROCEDURE — 84145 PROCALCITONIN (PCT): CPT

## 2019-06-19 PROCEDURE — 84439 ASSAY OF FREE THYROXINE: CPT

## 2019-06-19 PROCEDURE — 86900 BLOOD TYPING SEROLOGIC ABO: CPT

## 2019-06-19 RX ORDER — ACETAMINOPHEN 325 MG/1
650 TABLET ORAL EVERY 4 HOURS PRN
Status: DISCONTINUED | OUTPATIENT
Start: 2019-06-19 | End: 2019-06-21 | Stop reason: HOSPADM

## 2019-06-19 RX ORDER — DIPHENHYDRAMINE HYDROCHLORIDE 50 MG/ML
50 INJECTION INTRAMUSCULAR; INTRAVENOUS EVERY 6 HOURS
Status: COMPLETED | OUTPATIENT
Start: 2019-06-20 | End: 2019-06-20

## 2019-06-19 RX ORDER — METHYLPREDNISOLONE SODIUM SUCCINATE 125 MG/2ML
125 INJECTION, POWDER, LYOPHILIZED, FOR SOLUTION INTRAMUSCULAR; INTRAVENOUS ONCE
Status: DISCONTINUED | OUTPATIENT
Start: 2019-06-19 | End: 2019-06-19

## 2019-06-19 RX ORDER — METHYLPREDNISOLONE SODIUM SUCCINATE 40 MG/ML
40 INJECTION, POWDER, LYOPHILIZED, FOR SOLUTION INTRAMUSCULAR; INTRAVENOUS EVERY 6 HOURS
Status: DISCONTINUED | OUTPATIENT
Start: 2019-06-20 | End: 2019-06-20

## 2019-06-19 RX ORDER — METHYLPREDNISOLONE SODIUM SUCCINATE 125 MG/2ML
125 INJECTION, POWDER, LYOPHILIZED, FOR SOLUTION INTRAMUSCULAR; INTRAVENOUS ONCE
Status: COMPLETED | OUTPATIENT
Start: 2019-06-19 | End: 2019-06-19

## 2019-06-19 RX ORDER — 0.9 % SODIUM CHLORIDE 0.9 %
1000 INTRAVENOUS SOLUTION INTRAVENOUS ONCE
Status: COMPLETED | OUTPATIENT
Start: 2019-06-19 | End: 2019-06-19

## 2019-06-19 RX ORDER — DIPHENHYDRAMINE HYDROCHLORIDE 50 MG/ML
25 INJECTION INTRAMUSCULAR; INTRAVENOUS ONCE
Status: DISCONTINUED | OUTPATIENT
Start: 2019-06-19 | End: 2019-06-19 | Stop reason: DRUGHIGH

## 2019-06-19 RX ORDER — FLUTICASONE PROPIONATE 50 MCG
1 SPRAY, SUSPENSION (ML) NASAL DAILY
Status: DISCONTINUED | OUTPATIENT
Start: 2019-06-20 | End: 2019-06-21 | Stop reason: HOSPADM

## 2019-06-19 RX ORDER — LEVOTHYROXINE SODIUM 112 UG/1
112 TABLET ORAL
Status: DISCONTINUED | OUTPATIENT
Start: 2019-06-20 | End: 2019-06-21 | Stop reason: HOSPADM

## 2019-06-19 RX ORDER — CETIRIZINE HYDROCHLORIDE 10 MG/1
10 TABLET ORAL DAILY
Status: DISCONTINUED | OUTPATIENT
Start: 2019-06-19 | End: 2019-06-19

## 2019-06-19 RX ORDER — DEXAMETHASONE SODIUM PHOSPHATE 10 MG/ML
10 INJECTION INTRAMUSCULAR; INTRAVENOUS ONCE
Status: DISCONTINUED | OUTPATIENT
Start: 2019-06-19 | End: 2019-06-19 | Stop reason: DRUGHIGH

## 2019-06-19 RX ORDER — CLONAZEPAM 0.5 MG/1
1 TABLET ORAL 3 TIMES DAILY
Status: DISCONTINUED | OUTPATIENT
Start: 2019-06-20 | End: 2019-06-20

## 2019-06-19 RX ORDER — PRAVASTATIN SODIUM 20 MG
20 TABLET ORAL DAILY
Status: DISCONTINUED | OUTPATIENT
Start: 2019-06-20 | End: 2019-06-21 | Stop reason: HOSPADM

## 2019-06-19 RX ORDER — AMLODIPINE BESYLATE 5 MG/1
5 TABLET ORAL DAILY
Status: DISCONTINUED | OUTPATIENT
Start: 2019-06-20 | End: 2019-06-21 | Stop reason: HOSPADM

## 2019-06-19 RX ORDER — DIPHENHYDRAMINE HYDROCHLORIDE 50 MG/ML
50 INJECTION INTRAMUSCULAR; INTRAVENOUS ONCE
Status: COMPLETED | OUTPATIENT
Start: 2019-06-19 | End: 2019-06-19

## 2019-06-19 RX ORDER — FLUOXETINE 10 MG/1
30 TABLET, FILM COATED ORAL DAILY
Status: DISCONTINUED | OUTPATIENT
Start: 2019-06-20 | End: 2019-06-21 | Stop reason: HOSPADM

## 2019-06-19 RX ORDER — METHYLPREDNISOLONE SODIUM SUCCINATE 125 MG/2ML
INJECTION, POWDER, LYOPHILIZED, FOR SOLUTION INTRAMUSCULAR; INTRAVENOUS
Status: DISPENSED
Start: 2019-06-19 | End: 2019-06-20

## 2019-06-19 RX ADMIN — METHYLPREDNISOLONE SODIUM SUCCINATE 125 MG: 125 INJECTION, POWDER, FOR SOLUTION INTRAMUSCULAR; INTRAVENOUS at 18:32

## 2019-06-19 RX ADMIN — SODIUM CHLORIDE 1000 ML: 9 INJECTION, SOLUTION INTRAVENOUS at 18:32

## 2019-06-19 RX ADMIN — DIPHENHYDRAMINE HYDROCHLORIDE 50 MG: 50 INJECTION, SOLUTION INTRAMUSCULAR; INTRAVENOUS at 18:32

## 2019-06-19 RX ADMIN — FAMOTIDINE 20 MG: 10 INJECTION INTRAVENOUS at 18:32

## 2019-06-19 ASSESSMENT — PAIN DESCRIPTION - LOCATION
LOCATION: NECK;JAW
LOCATION: JAW;LEG

## 2019-06-19 ASSESSMENT — PAIN SCALES - GENERAL
PAINLEVEL_OUTOF10: 7
PAINLEVEL_OUTOF10: 6

## 2019-06-19 ASSESSMENT — PAIN DESCRIPTION - PAIN TYPE
TYPE: ACUTE PAIN
TYPE: ACUTE PAIN

## 2019-06-19 ASSESSMENT — PAIN DESCRIPTION - DESCRIPTORS: DESCRIPTORS: DULL

## 2019-06-19 NOTE — ED PROVIDER NOTES
Department of Emergency Medicine   ED  Provider Note  Admit Date/RoomTime: 6/19/2019  6:19 PM  ED Room: 21/21      History of Present Illness:  6/19/19, Time: 6:31 PM         Elham Prather is a 63 y.o. male presenting to the ED for angioedema, beginning 4-5 hours ago. The complaint has been constant, moderate in severity, and worsened by nothing. Pt states he woke early this morning with a tingling sensation in his lips and left side of face, but went back to bed. However, 4-5 hours ago he woke back up with swelling to the upper lip. Swelling has improved PTA. Pt was recently taken off lisinopril three months ago due to cough and was placed on amlodipine. Currently, he complains of slight dysphagia beginning 4-5 hours ago. Patient also RLQ abdominal pain beginning two months ago. He has not undergone any work up of his abdominal pain. No fevers, vomiting, or diarrhea. Tolerating po. He does not have a personal nor family history of angioedema/anaphylaxis. Pt denies any new foods, shampoos, or being stung by a bee. He denies any allergies. No recent medication changes. Pt denies CP, SOB, diaphoresis, neck/arm pain, dizziness, fever, chills, headache, or any other symptoms at this time. Review of Systems:   Pertinent positives and negatives are stated within HPI, all other systems reviewed and are negative.    --------------------------------------------- PAST HISTORY ---------------------------------------------  Past Medical History:  has a past medical history of Anxiety, Arthritis, Depression, GERD (gastroesophageal reflux disease), Hepatitis C, HLD (hyperlipidemia), Hypertension, and Thyroid disease. Past Surgical History:  has a past surgical history that includes Colonoscopy; Upper gastrointestinal endoscopy; hernia repair; Ankle fracture surgery (Right, [id number]); Ankle fracture surgery (Right, 01/19/2015);  Hardware Removal (Right, 7/10/2015); repair retinal tear/hole (Bilateral); other surgical listed below.      LABS:  Results for orders placed or performed during the hospital encounter of 06/19/19   CBC Auto Differential   Result Value Ref Range    WBC 8.9 4.5 - 11.5 E9/L    RBC 5.18 3.80 - 5.80 E12/L    Hemoglobin 15.1 12.5 - 16.5 g/dL    Hematocrit 43.5 37.0 - 54.0 %    MCV 84.0 80.0 - 99.9 fL    MCH 29.2 26.0 - 35.0 pg    MCHC 34.7 (H) 32.0 - 34.5 %    RDW 13.8 11.5 - 15.0 fL    Platelets 800 749 - 328 E9/L    MPV 12.4 (H) 7.0 - 12.0 fL    Neutrophils % 73.3 43.0 - 80.0 %    Immature Granulocytes % 0.6 0.0 - 5.0 %    Lymphocytes % 17.2 (L) 20.0 - 42.0 %    Monocytes % 6.3 2.0 - 12.0 %    Eosinophils % 2.0 0.0 - 6.0 %    Basophils % 0.6 0.0 - 2.0 %    Neutrophils # 6.55 1.80 - 7.30 E9/L    Immature Granulocytes # 0.05 E9/L    Lymphocytes # 1.54 1.50 - 4.00 E9/L    Monocytes # 0.56 0.10 - 0.95 E9/L    Eosinophils # 0.18 0.05 - 0.50 E9/L    Basophils # 0.05 0.00 - 0.20 E9/L   Comprehensive Metabolic Panel w/ Reflex to MG   Result Value Ref Range    Sodium 141 132 - 146 mmol/L    Potassium reflex Magnesium 4.6 3.5 - 5.0 mmol/L    Chloride 103 98 - 107 mmol/L    CO2 24 22 - 29 mmol/L    Anion Gap 14 7 - 16 mmol/L    Glucose 75 74 - 99 mg/dL    BUN 20 6 - 20 mg/dL    CREATININE 1.2 0.7 - 1.2 mg/dL    GFR Non-African American >60 >=60 mL/min/1.73    GFR African American >60     Calcium 9.3 8.6 - 10.2 mg/dL    Total Protein 7.1 6.4 - 8.3 g/dL    Alb 4.3 3.5 - 5.2 g/dL    Total Bilirubin 0.7 0.0 - 1.2 mg/dL    Alkaline Phosphatase 80 40 - 129 U/L    ALT 10 0 - 40 U/L    AST 18 0 - 39 U/L   Lipase   Result Value Ref Range    Lipase 6 (L) 13 - 60 U/L   Urinalysis, reflex to microscopic   Result Value Ref Range    Color, UA Yellow Straw/Yellow    Clarity, UA Clear Clear    Glucose, Ur Negative Negative mg/dL    Bilirubin Urine Negative Negative    Ketones, Urine 15 (A) Negative mg/dL    Specific Gravity, UA 1.010 1.005 - 1.030    Blood, Urine Negative Negative    pH, UA 5.5 5.0 - 9.0    Protein, UA Negative Negative mg/dL    Urobilinogen, Urine 0.2 <2.0 E.U./dL    Nitrite, Urine Negative Negative    Leukocyte Esterase, Urine Negative Negative   EKG 12 Lead   Result Value Ref Range    Ventricular Rate 51 BPM    Atrial Rate 51 BPM    P-R Interval 174 ms    QRS Duration 84 ms    Q-T Interval 472 ms    QTc Calculation (Bazett) 435 ms    P Axis 37 degrees    R Axis -13 degrees    T Axis 20 degrees   TYPE AND SCREEN   Result Value Ref Range    ABO/Rh O POS     Antibody Screen NEG        RADIOLOGY:  Interpreted by Radiologist.  XR CHEST PORTABLE   Final Result   No acute cardiopulmonary findings. ------------------------- NURSING NOTES AND VITALS REVIEWED ---------------------------   The nursing notes within the ED encounter and vital signs as below have been reviewed by myself. BP (!) 151/86   Pulse (!) 49   Temp 97.7 °F (36.5 °C) (Tympanic)   Resp 18   Ht 6' 1.5\" (1.867 m)   Wt 217 lb (98.4 kg)   SpO2 95%   BMI 28.24 kg/m²   Oxygen Saturation Interpretation: Abnormal    The patients available past medical records and past encounters were reviewed. ------------------------------ ED COURSE/MEDICAL DECISION MAKING----------------------  Medications   famotidine (PEPCID) injection 20 mg (20 mg Intravenous Given 6/19/19 1832)   0.9 % sodium chloride bolus (0 mLs Intravenous Stopped 6/19/19 2008)   methylPREDNISolone sodium (SOLU-MEDROL) injection 125 mg (125 mg Intravenous Given 6/19/19 1832)   diphenhydrAMINE (BENADRYL) injection 50 mg (50 mg Intravenous Given 6/19/19 1832)     Medical Decision Making:     Patient is a 28-year-old male presenting with angioedema. He maintained his airway throughout his ED course, and he did not require intubation. He was given recommendation IV Solu-Medrol, Benadryl, and Pepcid with minimal improvement of the angioedema. He had no worsening of the angioedema, so FFP was not given. Screening labs and chest x-ray unremarkable.   Spoke with Dr. Jesusita Mcdaniel who will admit to the MICU. Patient was also complaining of chronic abdominal pain which has been present for 2 months. He had right lower quadrant tenderness but no rebound or guarding. I did not want to obtain them imaging in the ED and send the patient to CT in the event of worsening angioedema, so I will defer further work-up of his abdominal pain to the inpatient doctor. Patient was monitored closely on the cardiac monitor throughout his ED course. ED Course as of Jun 19 2234 Wed Jun 19, 2019   1834 I obtained verbal consent for blood products as needed. Patient maintaining airway with no swelling of his posterior pharynx. No stridor or trismus. Will order type and screen for FFP and hold but not transfuse unless worsening symptoms. [JA]   2005 On re-evaluation, patient resting comfortably in NAD. No progression of angioedema. No posterior pharyngeal swelling. No stridor or trismus. No tongue swelling. No soft palate elevation. Handling secretions. Normal voice. Lungs clear. Intubation not indicated at this time. Will continue to monitor. [JA]   2018 EKG: This EKG is signed and interpreted by me. Rate: 51  Rhythm: Sinus  Interpretation: NSR  Comparison: no previous EKG available      [JA]   2056 Patient's exam unchanged. No need for intubation at this time. Will admit to ICU for close monitoring. [JA]   2103 Spoke with Sound. Will admit. [JA]      ED Course User Index  [JA] Jose Maria Delarosa MD     Critical Care:     Please note that the withdrawal or failure to initiate urgent interventions for this patient would likely result in a life threatening deterioration or permanent disability. Accordingly this patient received 30 minutes of critical care time, excluding separately billable procedures.     This patient's ED course included: a personal history and physicial examination, re-evaluation prior to disposition, IV medications, cardiac monitoring and continuous pulse oximetry    Counseling: The emergency provider has spoken with the patient and discussed todays results, in addition to providing specific details for the plan of care and counseling regarding the diagnosis and prognosis. Questions are answered at this time and they are agreeable with the plan.     --------------------------------- IMPRESSION AND DISPOSITION ---------------------------------    IMPRESSION  1. Angioedema, initial encounter    2. Right lower quadrant abdominal pain        DISPOSITION  Disposition: Admit to CCU/ICU  Patient condition is stable    6/19/19, 6:31 PM.    This note is prepared by Brenna Fisher, acting as Scribe for Cindy Brown MD.    Cindy Brown MD:  The scribe's documentation has been prepared under my direction and personally reviewed by me in its entirety. I confirm that the note above accurately reflects all work, treatment, procedures, and medical decision making performed by me.        Cindy Brown MD  06/19/19 3297

## 2019-06-19 NOTE — ED NOTES
FIRST PROVIDER CONTACT ASSESSMENT NOTE      Department of Emergency Medicine   Admit Date: No admission date for patient encounter. Chief Complaint: Angioedema (was taken off his lisinopril 3 months ago, woke up today with facial swelling. )      History of Present Illness:    Kp Reyes is a 63 y.o. male who presents to the ED for evaluation of left upper lip swelling and facial swelling onset this morning on awakening. He went to sleep last night and had some tingling to the left side of his lip and then awoke this morning with swelling to his left upper lip and left side of his face. He reports no fever or chills.   He feels like his throat is tight.          -----------------END OF FIRST PROVIDER CONTACT ASSESSMENT NOTE--------------  Electronically signed by Staci Saucedo PA-C   DD: 6/19/19               Staci Saucedo PA-C  06/19/19 1815

## 2019-06-20 ENCOUNTER — APPOINTMENT (OUTPATIENT)
Dept: ULTRASOUND IMAGING | Age: 59
DRG: 603 | End: 2019-06-20
Payer: MEDICARE

## 2019-06-20 ENCOUNTER — APPOINTMENT (OUTPATIENT)
Dept: GENERAL RADIOLOGY | Age: 59
DRG: 603 | End: 2019-06-20
Payer: MEDICARE

## 2019-06-20 LAB
ALBUMIN SERPL-MCNC: 4 G/DL (ref 3.5–5.2)
ALP BLD-CCNC: 73 U/L (ref 40–129)
ALT SERPL-CCNC: 10 U/L (ref 0–40)
AMPHETAMINE SCREEN, URINE: NOT DETECTED
ANION GAP SERPL CALCULATED.3IONS-SCNC: 14 MMOL/L (ref 7–16)
ANISOCYTOSIS: ABNORMAL
AST SERPL-CCNC: 16 U/L (ref 0–39)
BACTERIA: ABNORMAL /HPF
BARBITURATE SCREEN URINE: NOT DETECTED
BASOPHILS ABSOLUTE: 0 E9/L (ref 0–0.2)
BASOPHILS RELATIVE PERCENT: 0.2 % (ref 0–2)
BENZODIAZEPINE SCREEN, URINE: POSITIVE
BILIRUB SERPL-MCNC: 0.6 MG/DL (ref 0–1.2)
BILIRUBIN URINE: ABNORMAL
BLOOD, URINE: NEGATIVE
BUN BLDV-MCNC: 21 MG/DL (ref 6–20)
C4 COMPLEMENT: 20 MG/DL (ref 10–40)
CALCIUM SERPL-MCNC: 8.8 MG/DL (ref 8.6–10.2)
CANNABINOID SCREEN URINE: POSITIVE
CHLORIDE BLD-SCNC: 101 MMOL/L (ref 98–107)
CHOLESTEROL, TOTAL: 180 MG/DL (ref 0–199)
CLARITY: CLEAR
CO2: 23 MMOL/L (ref 22–29)
COCAINE METABOLITE SCREEN URINE: NOT DETECTED
COLOR: YELLOW
CREAT SERPL-MCNC: 1 MG/DL (ref 0.7–1.2)
EKG ATRIAL RATE: 51 BPM
EKG P AXIS: 37 DEGREES
EKG P-R INTERVAL: 174 MS
EKG Q-T INTERVAL: 472 MS
EKG QRS DURATION: 84 MS
EKG QTC CALCULATION (BAZETT): 435 MS
EKG R AXIS: -13 DEGREES
EKG T AXIS: 20 DEGREES
EKG VENTRICULAR RATE: 51 BPM
EOSINOPHILS ABSOLUTE: 0 E9/L (ref 0.05–0.5)
EOSINOPHILS RELATIVE PERCENT: 0 % (ref 0–6)
GFR AFRICAN AMERICAN: >60
GFR NON-AFRICAN AMERICAN: >60 ML/MIN/1.73
GLUCOSE BLD-MCNC: 163 MG/DL (ref 74–99)
GLUCOSE URINE: NEGATIVE MG/DL
HCT VFR BLD CALC: 42.8 % (ref 37–54)
HDLC SERPL-MCNC: 68 MG/DL
HEMOGLOBIN: 14.7 G/DL (ref 12.5–16.5)
KETONES, URINE: 40 MG/DL
LDL CHOLESTEROL CALCULATED: 102 MG/DL (ref 0–99)
LEUKOCYTE ESTERASE, URINE: NEGATIVE
LYMPHOCYTES ABSOLUTE: 0.52 E9/L (ref 1.5–4)
LYMPHOCYTES RELATIVE PERCENT: 7.9 % (ref 20–42)
MAGNESIUM: 2.2 MG/DL (ref 1.6–2.6)
MCH RBC QN AUTO: 28.8 PG (ref 26–35)
MCHC RBC AUTO-ENTMCNC: 34.3 % (ref 32–34.5)
MCV RBC AUTO: 83.9 FL (ref 80–99.9)
METHADONE SCREEN, URINE: NOT DETECTED
MONOCYTES ABSOLUTE: 0 E9/L (ref 0.1–0.95)
MONOCYTES RELATIVE PERCENT: 0.9 % (ref 2–12)
NEUTROPHILS ABSOLUTE: 5.98 E9/L (ref 1.8–7.3)
NEUTROPHILS RELATIVE PERCENT: 92.1 % (ref 43–80)
NITRITE, URINE: NEGATIVE
OPIATE SCREEN URINE: NOT DETECTED
PDW BLD-RTO: 13.5 FL (ref 11.5–15)
PH UA: 5.5 (ref 5–9)
PHENCYCLIDINE SCREEN URINE: NOT DETECTED
PLATELET # BLD: 186 E9/L (ref 130–450)
PMV BLD AUTO: 11.9 FL (ref 7–12)
POLYCHROMASIA: ABNORMAL
POTASSIUM SERPL-SCNC: 4.7 MMOL/L (ref 3.5–5)
PROCALCITONIN: 0.08 NG/ML (ref 0–0.08)
PROPOXYPHENE SCREEN: NOT DETECTED
PROTEIN UA: NEGATIVE MG/DL
RBC # BLD: 5.1 E12/L (ref 3.8–5.8)
RBC UA: ABNORMAL /HPF (ref 0–2)
SEDIMENTATION RATE, ERYTHROCYTE: 6 MM/HR (ref 0–15)
SODIUM BLD-SCNC: 138 MMOL/L (ref 132–146)
SPECIFIC GRAVITY UA: 1.02 (ref 1–1.03)
TOTAL PROTEIN: 6.8 G/DL (ref 6.4–8.3)
TRIGL SERPL-MCNC: 48 MG/DL (ref 0–149)
TROPONIN: <0.01 NG/ML (ref 0–0.03)
UROBILINOGEN, URINE: 0.2 E.U./DL
VLDLC SERPL CALC-MCNC: 10 MG/DL
WBC # BLD: 6.5 E9/L (ref 4.5–11.5)
WBC UA: ABNORMAL /HPF (ref 0–5)

## 2019-06-20 PROCEDURE — 2500000003 HC RX 250 WO HCPCS: Performed by: INTERNAL MEDICINE

## 2019-06-20 PROCEDURE — 96365 THER/PROPH/DIAG IV INF INIT: CPT

## 2019-06-20 PROCEDURE — 2580000003 HC RX 258: Performed by: INTERNAL MEDICINE

## 2019-06-20 PROCEDURE — 96375 TX/PRO/DX INJ NEW DRUG ADDON: CPT

## 2019-06-20 PROCEDURE — 6370000000 HC RX 637 (ALT 250 FOR IP): Performed by: INTERNAL MEDICINE

## 2019-06-20 PROCEDURE — 6360000002 HC RX W HCPCS: Performed by: INTERNAL MEDICINE

## 2019-06-20 PROCEDURE — G0480 DRUG TEST DEF 1-7 CLASSES: HCPCS

## 2019-06-20 PROCEDURE — 93010 ELECTROCARDIOGRAM REPORT: CPT | Performed by: INTERNAL MEDICINE

## 2019-06-20 PROCEDURE — 1200000000 HC SEMI PRIVATE

## 2019-06-20 PROCEDURE — 85025 COMPLETE CBC W/AUTO DIFF WBC: CPT

## 2019-06-20 PROCEDURE — 83735 ASSAY OF MAGNESIUM: CPT

## 2019-06-20 PROCEDURE — 99222 1ST HOSP IP/OBS MODERATE 55: CPT | Performed by: INTERNAL MEDICINE

## 2019-06-20 PROCEDURE — 81001 URINALYSIS AUTO W/SCOPE: CPT

## 2019-06-20 PROCEDURE — 2500000003 HC RX 250 WO HCPCS: Performed by: FAMILY MEDICINE

## 2019-06-20 PROCEDURE — 6370000000 HC RX 637 (ALT 250 FOR IP): Performed by: FAMILY MEDICINE

## 2019-06-20 PROCEDURE — G0378 HOSPITAL OBSERVATION PER HR: HCPCS

## 2019-06-20 PROCEDURE — APPSS60 APP SPLIT SHARED TIME 46-60 MINUTES: Performed by: NURSE PRACTITIONER

## 2019-06-20 PROCEDURE — 80307 DRUG TEST PRSMV CHEM ANLYZR: CPT

## 2019-06-20 PROCEDURE — 96376 TX/PRO/DX INJ SAME DRUG ADON: CPT

## 2019-06-20 PROCEDURE — 36415 COLL VENOUS BLD VENIPUNCTURE: CPT

## 2019-06-20 PROCEDURE — 96372 THER/PROPH/DIAG INJ SC/IM: CPT

## 2019-06-20 PROCEDURE — 74018 RADEX ABDOMEN 1 VIEW: CPT

## 2019-06-20 PROCEDURE — 76705 ECHO EXAM OF ABDOMEN: CPT

## 2019-06-20 PROCEDURE — 86160 COMPLEMENT ANTIGEN: CPT

## 2019-06-20 PROCEDURE — 84484 ASSAY OF TROPONIN QUANT: CPT

## 2019-06-20 PROCEDURE — 80061 LIPID PANEL: CPT

## 2019-06-20 PROCEDURE — 80053 COMPREHEN METABOLIC PANEL: CPT

## 2019-06-20 PROCEDURE — 2580000003 HC RX 258: Performed by: FAMILY MEDICINE

## 2019-06-20 PROCEDURE — 96366 THER/PROPH/DIAG IV INF ADDON: CPT

## 2019-06-20 PROCEDURE — 6360000002 HC RX W HCPCS: Performed by: FAMILY MEDICINE

## 2019-06-20 RX ORDER — THIAMINE MONONITRATE (VIT B1) 100 MG
100 TABLET ORAL DAILY
Status: DISCONTINUED | OUTPATIENT
Start: 2019-06-23 | End: 2019-06-21 | Stop reason: HOSPADM

## 2019-06-20 RX ORDER — SODIUM CHLORIDE 0.9 % (FLUSH) 0.9 %
10 SYRINGE (ML) INJECTION EVERY 12 HOURS SCHEDULED
Status: DISCONTINUED | OUTPATIENT
Start: 2019-06-20 | End: 2019-06-20 | Stop reason: SDUPTHER

## 2019-06-20 RX ORDER — THIAMINE HYDROCHLORIDE 100 MG/ML
100 INJECTION, SOLUTION INTRAMUSCULAR; INTRAVENOUS DAILY
Status: DISCONTINUED | OUTPATIENT
Start: 2019-06-20 | End: 2019-06-20 | Stop reason: SDUPTHER

## 2019-06-20 RX ORDER — CLONAZEPAM 0.5 MG/1
1 TABLET ORAL 3 TIMES DAILY
Status: DISCONTINUED | OUTPATIENT
Start: 2019-06-20 | End: 2019-06-21 | Stop reason: HOSPADM

## 2019-06-20 RX ORDER — LORAZEPAM 1 MG/1
1 TABLET ORAL
Status: DISCONTINUED | OUTPATIENT
Start: 2019-06-20 | End: 2019-06-21 | Stop reason: HOSPADM

## 2019-06-20 RX ORDER — SODIUM CHLORIDE 0.9 % (FLUSH) 0.9 %
10 SYRINGE (ML) INJECTION PRN
Status: DISCONTINUED | OUTPATIENT
Start: 2019-06-20 | End: 2019-06-20 | Stop reason: SDUPTHER

## 2019-06-20 RX ORDER — SODIUM CHLORIDE 0.9 % (FLUSH) 0.9 %
10 SYRINGE (ML) INJECTION PRN
Status: DISCONTINUED | OUTPATIENT
Start: 2019-06-20 | End: 2019-06-21 | Stop reason: HOSPADM

## 2019-06-20 RX ORDER — LORAZEPAM 2 MG/ML
3 INJECTION INTRAMUSCULAR
Status: DISCONTINUED | OUTPATIENT
Start: 2019-06-20 | End: 2019-06-21 | Stop reason: HOSPADM

## 2019-06-20 RX ORDER — LORAZEPAM 2 MG/ML
4 INJECTION INTRAMUSCULAR
Status: DISCONTINUED | OUTPATIENT
Start: 2019-06-20 | End: 2019-06-21 | Stop reason: HOSPADM

## 2019-06-20 RX ORDER — LORAZEPAM 1 MG/1
4 TABLET ORAL
Status: DISCONTINUED | OUTPATIENT
Start: 2019-06-20 | End: 2019-06-21 | Stop reason: HOSPADM

## 2019-06-20 RX ORDER — METHYLPREDNISOLONE SODIUM SUCCINATE 40 MG/ML
40 INJECTION, POWDER, LYOPHILIZED, FOR SOLUTION INTRAMUSCULAR; INTRAVENOUS EVERY 12 HOURS
Status: DISCONTINUED | OUTPATIENT
Start: 2019-06-20 | End: 2019-06-21 | Stop reason: HOSPADM

## 2019-06-20 RX ORDER — SODIUM CHLORIDE 0.9 % (FLUSH) 0.9 %
10 SYRINGE (ML) INJECTION EVERY 12 HOURS SCHEDULED
Status: DISCONTINUED | OUTPATIENT
Start: 2019-06-20 | End: 2019-06-21 | Stop reason: HOSPADM

## 2019-06-20 RX ORDER — LORAZEPAM 2 MG/ML
2 INJECTION INTRAMUSCULAR
Status: DISCONTINUED | OUTPATIENT
Start: 2019-06-20 | End: 2019-06-21 | Stop reason: HOSPADM

## 2019-06-20 RX ORDER — ONDANSETRON 2 MG/ML
4 INJECTION INTRAMUSCULAR; INTRAVENOUS EVERY 6 HOURS PRN
Status: DISCONTINUED | OUTPATIENT
Start: 2019-06-20 | End: 2019-06-21 | Stop reason: HOSPADM

## 2019-06-20 RX ORDER — FOLIC ACID 1 MG/1
1 TABLET ORAL DAILY
Status: DISCONTINUED | OUTPATIENT
Start: 2019-06-23 | End: 2019-06-21 | Stop reason: HOSPADM

## 2019-06-20 RX ORDER — LORAZEPAM 1 MG/1
3 TABLET ORAL
Status: DISCONTINUED | OUTPATIENT
Start: 2019-06-20 | End: 2019-06-21 | Stop reason: HOSPADM

## 2019-06-20 RX ORDER — NICOTINE 21 MG/24HR
1 PATCH, TRANSDERMAL 24 HOURS TRANSDERMAL DAILY
Status: DISCONTINUED | OUTPATIENT
Start: 2019-06-20 | End: 2019-06-21 | Stop reason: HOSPADM

## 2019-06-20 RX ORDER — LORAZEPAM 2 MG/ML
1 INJECTION INTRAMUSCULAR
Status: DISCONTINUED | OUTPATIENT
Start: 2019-06-20 | End: 2019-06-21 | Stop reason: HOSPADM

## 2019-06-20 RX ORDER — LORAZEPAM 1 MG/1
2 TABLET ORAL
Status: DISCONTINUED | OUTPATIENT
Start: 2019-06-20 | End: 2019-06-21 | Stop reason: HOSPADM

## 2019-06-20 RX ADMIN — Medication 10 ML: at 09:49

## 2019-06-20 RX ADMIN — CLONAZEPAM 1 MG: 0.5 TABLET ORAL at 00:39

## 2019-06-20 RX ADMIN — FAMOTIDINE 20 MG: 10 INJECTION INTRAVENOUS at 21:00

## 2019-06-20 RX ADMIN — Medication 10 ML: at 21:01

## 2019-06-20 RX ADMIN — CLONAZEPAM 1 MG: 0.5 TABLET ORAL at 08:11

## 2019-06-20 RX ADMIN — FAMOTIDINE 20 MG: 10 INJECTION INTRAVENOUS at 06:13

## 2019-06-20 RX ADMIN — CEFTRIAXONE SODIUM 1 G: 1 INJECTION, POWDER, FOR SOLUTION INTRAMUSCULAR; INTRAVENOUS at 09:37

## 2019-06-20 RX ADMIN — LORAZEPAM 1 MG: 1 TABLET ORAL at 12:41

## 2019-06-20 RX ADMIN — DIPHENHYDRAMINE HYDROCHLORIDE 50 MG: 50 INJECTION, SOLUTION INTRAMUSCULAR; INTRAVENOUS at 00:39

## 2019-06-20 RX ADMIN — Medication 10 ML: at 08:18

## 2019-06-20 RX ADMIN — LEVOTHYROXINE SODIUM 112 MCG: 112 TABLET ORAL at 06:13

## 2019-06-20 RX ADMIN — CLONAZEPAM 1 MG: 0.5 TABLET ORAL at 14:15

## 2019-06-20 RX ADMIN — FLUTICASONE PROPIONATE 1 SPRAY: 50 SPRAY, METERED NASAL at 08:11

## 2019-06-20 RX ADMIN — METHYLPREDNISOLONE SODIUM SUCCINATE 40 MG: 40 INJECTION, POWDER, FOR SOLUTION INTRAMUSCULAR; INTRAVENOUS at 06:13

## 2019-06-20 RX ADMIN — AMLODIPINE BESYLATE 5 MG: 5 TABLET ORAL at 08:11

## 2019-06-20 RX ADMIN — THIAMINE HYDROCHLORIDE: 100 INJECTION, SOLUTION INTRAMUSCULAR; INTRAVENOUS at 02:35

## 2019-06-20 RX ADMIN — ACETAMINOPHEN 650 MG: 325 TABLET, FILM COATED ORAL at 18:05

## 2019-06-20 RX ADMIN — DIPHENHYDRAMINE HYDROCHLORIDE 50 MG: 50 INJECTION, SOLUTION INTRAMUSCULAR; INTRAVENOUS at 06:13

## 2019-06-20 RX ADMIN — PRAVASTATIN SODIUM 20 MG: 20 TABLET ORAL at 08:11

## 2019-06-20 RX ADMIN — METHYLPREDNISOLONE SODIUM SUCCINATE 40 MG: 40 INJECTION, POWDER, FOR SOLUTION INTRAMUSCULAR; INTRAVENOUS at 18:05

## 2019-06-20 RX ADMIN — METHYLPREDNISOLONE SODIUM SUCCINATE 40 MG: 40 INJECTION, POWDER, FOR SOLUTION INTRAMUSCULAR; INTRAVENOUS at 00:39

## 2019-06-20 RX ADMIN — ENOXAPARIN SODIUM 40 MG: 40 INJECTION SUBCUTANEOUS at 08:12

## 2019-06-20 RX ADMIN — METRONIDAZOLE 500 MG: 500 INJECTION, SOLUTION INTRAVENOUS at 09:39

## 2019-06-20 RX ADMIN — FLUOXETINE 30 MG: 10 TABLET, FILM COATED ORAL at 08:11

## 2019-06-20 RX ADMIN — METRONIDAZOLE 500 MG: 500 INJECTION, SOLUTION INTRAVENOUS at 21:00

## 2019-06-20 RX ADMIN — CLONAZEPAM 1 MG: 0.5 TABLET ORAL at 21:00

## 2019-06-20 ASSESSMENT — PAIN SCALES - GENERAL
PAINLEVEL_OUTOF10: 8
PAINLEVEL_OUTOF10: 0
PAINLEVEL_OUTOF10: 0
PAINLEVEL_OUTOF10: 6
PAINLEVEL_OUTOF10: 6
PAINLEVEL_OUTOF10: 0
PAINLEVEL_OUTOF10: 0
PAINLEVEL_OUTOF10: 8
PAINLEVEL_OUTOF10: 4
PAINLEVEL_OUTOF10: 0
PAINLEVEL_OUTOF10: 4

## 2019-06-20 ASSESSMENT — PAIN DESCRIPTION - DESCRIPTORS
DESCRIPTORS: ACHING;DISCOMFORT;SORE
DESCRIPTORS: DULL
DESCRIPTORS: ACHING;DISCOMFORT;SORE;THROBBING

## 2019-06-20 ASSESSMENT — PAIN DESCRIPTION - LOCATION
LOCATION: NECK;JAW
LOCATION: TEETH
LOCATION: TEETH
LOCATION: JAW;NECK

## 2019-06-20 ASSESSMENT — PAIN DESCRIPTION - FREQUENCY: FREQUENCY: CONTINUOUS

## 2019-06-20 ASSESSMENT — PAIN DESCRIPTION - PAIN TYPE
TYPE: ACUTE PAIN

## 2019-06-20 ASSESSMENT — PAIN DESCRIPTION - ONSET: ONSET: ON-GOING

## 2019-06-20 ASSESSMENT — PAIN DESCRIPTION - PROGRESSION: CLINICAL_PROGRESSION: NOT CHANGED

## 2019-06-20 ASSESSMENT — PAIN DESCRIPTION - ORIENTATION
ORIENTATION: LEFT
ORIENTATION: LEFT

## 2019-06-20 NOTE — PATIENT CARE CONFERENCE
P Quality Flow/Interdisciplinary Rounds Progress Note        Quality Flow Rounds held on June 20, 2019    Disciplines Attending:  Bedside Nurse, Charge nurse, PT, and . Samantha Spencer was admitted on 6/19/2019  6:19 PM    Anticipated Discharge Date:  Expected Discharge Date: 06/22/19    Disposition: home    Miguel Score:  Miguel Scale Score: 17    Readmission Risk              Risk of Unplanned Readmission:        13           Discussed patient goal for the day, patient clinical progression, and barriers to discharge. The following Goal(s) of the Day/Commitment(s) have been identified:  Continue to monitor. Possible transfer.        Italo Esposito  June 20, 2019

## 2019-06-20 NOTE — CONSULTS
[address]                  [address] [facility],  [facility]                                  CONSULTATION    PATIENT NAME: Shikha Corral                    :        1960  MED REC NO:   [id number]                            ROOM:       [id number]  ACCOUNT NO:   [de-identified]                           ADMIT DATE: 2019  PROVIDER:     CÉSAR Valdivia     CONSULT DATE:  2019    CONSULTING PROVIDER:  Carla Justin DO    REASON FOR CONSULTATION:  Chest pain and sinus bradycardia. HISTORY OF PRESENT ILLNESS:  The patient is a 15-year-old  male  who was previously seen in an initial inpatient cardiac consultation  with Dr. Alejandro Butler, on 2012 for chest pain, at which time he  underwent a Lexiscan MPS that was nonischemic with no wall motion  abnormality with an EF of 53%. His medical history also includes  hypertension, hyperlipidemia, anxiety and depression, history of  hepatitis C with IV drug abuse in the past, hypothyroidism, GERD, family  history of premature coronary artery disease, and reported frequent  falls attributed to sciatica without syncope. The patient presented to [facility] Emergency Department on  2019 with complaints of facial edema. He states that prior to  presenting to the emergency department the night before, he had gone to  bed \"and I felt like a cold sore was coming on my top lip. \"  He states  the following day, he woke up with significant right mouth and facial  edema. He states over the course of the past six months, he has had a  chipped teeth with mouth soreness. He states that he has had  intermittent drainage from his teeth. He also states over the course of  the past several years, he has been having left-sided chest pain  described as a burning sensation that lasted several hours in duration,  occurs with or without exertion and is unchanged with exertion.   He  denies accompanying dyspnea on exertion, orthopnea and PND, and edema to  bilateral extremities. He states over the course of the past two  months, he has been having right lower quadrant pain without  accompanying diarrhea, constipation, nausea, vomiting, fever, or chills,  and states that his right lower quadrant pain worsens with eating, and  at times, his chest discomfort worsens after he eats. Upon arrival to the emergency department, his vital signs were temporal  temperature 97.7, heart rate 90 beats per minute, respiratory rate 18,  blood pressure 126/83, oxygen saturation 92% on room air. A 12-lead EKG  was obtained that showed sinus bradycardia with a heart rate of 51, WBC  is 8.9, H and H 15.1 and 43.5, BUN/SCR 20/1.2. Urinalysis:  Negative  nitrites and leukocyte esterase. Chest x-ray: Unremarkable. He  received a liter of normal saline, Benadryl 50 mg IV, Solu-Medrol 125 mg  IV, and Pepcid. Abdominal x-ray was also unremarkable. He was admitted to the medical intensive care unit and Cardiology was  consulted by Dr. Karly Brewer for further management of chest pain and sinus  bradycardia. Currently, the patient is sitting up in bed. Denies chest  pain and dyspnea. He states that his facial edema has significantly  lessened. He is still receiving Benadryl and Solu-Medrol. He has had  an additional troponin less than 0.01. Urine toxicology positive for  benzodiazepines and cannabinoids. CRP and sed rate are pending. Abdominal ultrasound is pending. Telemetry monitoring presently shows  sinus rhythm with a heart rate in the 70s, and he is in no acute  distress. PAST MEDICAL/SURGICAL HISTORY:  1. Lexiscan MPS, 06/06/2012:  Nonischemic. No wall motion abnormality. EF 53%. 2.  Hypertension. 3.  Hyperlipidemia. 4.  GERD. 5.  Hypothyroidism, on replacement therapy. 6.  Depression and anxiety. 7.  History of hepatitis C with IV drug abuse. 8.  Family history of premature coronary artery disease.   9. Frequent falls, attributed to sciatica. 10.  Arthritis. 11.  Status post right ankle fracture with surgical repair. 12.  Status post repair of retinal hole/tear. 13.  Status post surgery for pyloric stenosis (further details unknown). 14.  Status post C5-C6 cervical fusion. FAMILY HISTORY:  Mother  from complications of excision of tumor  and hematoma. Father  from complications of pneumonia and COPD. He has a brother who had an initial MI at the age of 61 recently. SOCIAL HISTORY:  Currently, he smokes half a pack of cigarettes a day,  has done so for 15 years. He states that he drinks five beers a week. Denies illicit drug use. Denies any caffeine intake. ALLERGIES:  LISINOPRIL (cough). HOME MEDICATIONS:  Norvasc 5 mg daily, Astelin, vitamin B, Klonopin,  Prozac, Flonase, Prevacid, Synthroid 112 mcg daily, multivitamin,  NicoDerm CQ, Zofran, pravastatin 20 mg daily, Flomax, and trazodone. HOSPITAL MEDICATIONS:  Norvasc 5 mg daily, Rocephin, Klonopin, Benadryl  50 mg IV q.6 hours x2 doses, Lovenox 40 mg daily, Pepcid, Prozac,  Flonase, Folvite, Synthroid 112 mcg daily, Solu-Medrol 40 mg IV q.12  hours, Flagyl, pravastatin 20 mg daily, normal saline and folic acid  with vitamin K at 100 mL per hour, and vitamin B1.    REVIEW OF SYSTEMS:  CONSTITUTIONAL:  Denies fevers, chills, night  sweats, weight loss, and fatigue. HEENT:  Facial edema and lip  numbness. See HPI. Complains of oral pain, possible abscess. Denies  headaches, nosebleeds. MUSCULOSKELETAL:  Denies falls. Complains of  pain to bilateral lower extremities with ambulation. Denies edema to  bilateral lower extremities. NEURO:  Complains of bilateral lower  extremity numbness and tingling and weakness. He states that his legs  give out frequently without syncope. Complains of dizziness,  lightheadedness with quick change of position. CARDIAC:  Chest pain. See HPI.   Complains of intermittent palpitations, feelings of his heart  racing. LUNGS:  Complains of dyspnea on exertion. Denies orthopnea and  PND. GI:  Denies heartburn. Complains of abdominal pain. See HPI. Denies nausea, vomiting, diarrhea, constipation; black, bloody, tarry  stools. :  Denies dysuria and hematuria. HEMATOLOGIC:  Denies  excessive bleeding or bruising. LYMPHS:  Denies lumps or bumps to neck,  axilla, breast, or groin. ENDOCRINE:  Complains of intolerances to heat  with accompanying shortness of breath without chest pain. Denies  intolerances to cold. PSYCHIATRIC:  Complains of anxiety and  depression. PHYSICAL EXAMINATION:  VITAL SIGNS:  Oral temperature 98.1, heart rate 55 beats per minute,  respiratory rate 20, blood pressure 138/94, oxygen saturation 94% on  room air. Weight 209 pounds, BMI 26.9. CONSTITUTIONAL:  In general, this is a well-developed, middle-aged,   male, who appears his stated age, who is awake, alert, and  cooperative, in no apparent distress. HEENT:  Eyes:  Conjunctivae pink. No noted xanthomas. Oral mucosa pink  and moist.  NECK:  No stridor. Symmetrical, nontender. No JVD. CHEST:  Symmetrical, nontender with palpation. No accessory muscle use  or intercostal retractions. Lungs on auscultation, diminished  throughout all lung fields. CARDIOVASCULAR:  No carotid bruit noted. Heart on inspection shows no  noted pulsations. Heart on palpation, no heaves or thrills. PMI  nondisplaced. Heart on auscultation, regular rate and rhythm. No  murmur or rub. PERIPHERAL VASCULAR:  Trace bilateral lower extremity edema. Pedal  pulses are palpable and equal.  No clubbing or cyanosis. ABDOMEN:  Soft, obese. Nontender with palpation. Bowel sounds present  x4 quadrants. No palpable masses. No abdominal bruits or pulsation. MUSCULOSKELETAL:  Good muscle strength and tone. No atrophy or abnormal  movements. :  Deferred. SKIN:  Warm and dry.   No stasis dermatitis or ulcerations. NEUROLOGIC:  Alert and oriented x3. Speech is clear and appropriate. Follows simple commands. Moves all extremities. Pleasant affect. LABS/TEST:  CRP, sed rate, abdominal ultrasound pending. Sodium 138,  potassium 4.7, chloride 101, CO2 23, BUN 21, creatinine 1, blood glucose  163, calcium 8.8. GFR greater than 60. D-dimer 820. Troponin less  than 0.01 x2. TSH 1.050. Free T4 1. 10. Total cholesterol 180, HDL 68,  , triglycerides 48. Urine toxicology:  Positive for  benzodiazepines and cannabinoids. WBC is 6.5, hemoglobin 14.7,  hematocrit 42.8, platelets 636,281. Chest x-ray:  No acute cardiopulmonary findings. KUB:  No acute  process. IMPRESSION AND PLAN:  To follow as per Dr. Araceli Farley. CÉSAR Frias    D: 06/20/2019 12:31:58       T: 06/20/2019 13:28:52     [email or web address]  Job#: [id number]     Doc#: [id number]    CC:     I have personally seen and evaluated the patient. I personally obtained the history and performed the physical exam.  I personally reviewed all of the above labs, history, review of systems, and data. All of the assessments and recommendations are from me. All of the above cardiac medical decisions are from me. Please see my additional contributions to the history, physical exam, assessment, and recommendations below. History of chief complaint:  He hospital with facial swelling. He states that he has had intermittent episodes of chronic chest pains for several years. He states that these occur only during anxiety and do not occur with cutting the grass, going up stairs, doing other strenuous activities. He denies any shortness of breath or dyspnea. Review of systems:     Heart: as above   Lungs: as above   Eyes: denies changes in vision or discharge. Ears: denies changes in hearing or pain. Nose: denies epistaxis or masses   Throat: denies sore throat or trouble swallowing. Neuro: denies numbness, tingling, tremors.    Skin:

## 2019-06-20 NOTE — H&P
Hospital Medicine History & Physical      PCP: Radha Lipscomb MD    Date of Admission: 6/19/2019    Date of Service: Pt seen/examined on 06/19/2019 and Admitted to Inpatient with expected LOS greater than two midnights due to medical therapy. Chief Complaint:  Facial swelling      History Of Present Illness:      63 y.o. male who presented to [facility] with significant swelling to the left side of his face. Onset of swelling was this morning. Patient reports waking at around 0800 with some tingling in his upper right lip. He ate some toast and went back to bed. Upon waking again at around 1100, he had significant swelling to the right side of his face - please see attached photos. He was medicated with pepcid, benadryl, and solu-medrol. Swelling significantly improved. He complained of pain in the right face. Pain reported as 6-7/10. Pain described as pressure associated with some tightness in the left throat, but this has mostly resolved and mild SOB. He has a history of some dental caries. He had fillings done previously, but the filling fell out about 6 months ago. He reports occasional mild dental pain. He also complains of chest pain currently. He has intermittent chest pains at home. Pain reported in left chest. Pain reported as 6/10 and described as dull. Denies significant cough. Complains of abdominal pain in the right lower abdomen for the past 2 months. Pain reported as 6/10. Described as pressure. No alleviating or exacerbating factors identified. He denies fever, chills, or diaphoresis. Appetite has been normal. He has been moving his bowels fairly regularly - BM 4 days per week - brown and formed - denies diarrhea / melena / hematochezia. Denies nausea or vomiting. History of GERD, controlled with Prevacid. Denies dysphagia. History of anxiety and takes klonopin regularly. History of depression. Has hypothyroidism for which he is on Synthroid.   History of HCV treated / cleared >15 years ago. He has a history of colon polyps. His last colonoscopy was within the past few years at [facility] in [place] - reports normal exam with no polyps. He is on a Q2yrly colonoscopies but not keeping up. He has had abnormal PSA in the past, he has been asked to see urologist which has not been done yet. He was previously on lisinopril, but was discontinued 4/22/2019 and replaced with norvasc. He has had no other medication changes recently. Lisinopril was discontinued due to cough. No history of angioedema. Has been told in the recent past that is rule out was long and edematous for which he saw ENT. Patient reports that he drinks 3-4 days per week. He drinks up to 15 beers per day. He reports getting shaky if he does not drink. He was sober for several years, but started drinking again. On arrival, labs were grossly normal. CBC shows WBC 8.9, hgb 15.1, platelets 446. LFTs and lipase normal. BMP normal. He was noted to be bradycardic as low as 45 in the ER. Chest x-ray is negative. Vital signs otherwise normal other than mild hypertension.      Past Medical History:          Diagnosis Date    Anxiety     Arthritis     Depression     GERD (gastroesophageal reflux disease)     Hepatitis C     HLD (hyperlipidemia) 6/19/2019    Hypertension     Thyroid disease        Past Surgical History:          Procedure Laterality Date    ABDOMEN SURGERY      PYLORIC STENOSIS    ANKLE FRACTURE SURGERY Right [id number]    ANKLE FRACTURE SURGERY Right 01/19/2015    CERVICAL FUSION  3/30/2016    acdf c5-c6    COLONOSCOPY      ENDOSCOPY, COLON, DIAGNOSTIC      EYE SURGERY      FRACTURE SURGERY      HARDWARE REMOVAL Right 7/10/2015    Right    HERNIA REPAIR      OTHER SURGICAL HISTORY  1/18/2016    fluiroscopy guided cervical myelogram with conpated tomography    REPAIR RETINAL TEAR/HOLE Bilateral     UPPER GASTROINTESTINAL ENDOSCOPY         Medications Prior to Admission:      Prior to lives at home with his girlfriend. TOBACCO:   reports that he has been smoking cigarettes. He has a 1.50 pack-year smoking history. He has never used smokeless tobacco.  ETOH:   reports that he drinks alcohol. Family History:     Reviewed in detail Positive as follows:        Problem Relation Age of Onset    High Blood Pressure Father     Heart Disease Father     High Blood Pressure Sister     High Blood Pressure Brother     High Blood Pressure Sister        REVIEW OF SYSTEMS:   Pertinent positives as noted in the HPI. All other systems reviewed and negative. PHYSICAL EXAM:    BP (!) 144/94   Pulse 54   Temp 97.7 °F (36.5 °C) (Tympanic)   Resp 18   Ht 6' 1.5\" (1.867 m)   Wt 217 lb (98.4 kg)   SpO2 95%   BMI 28.24 kg/m²     General appearance: Middle-age male, ill-appearing and weak, mild painful distress, appears stated age and cooperative. HEENT:  Significant swelling to the left lower face - please see photo. Mildly tender to palpation. Pupils equal, round, and reactive to light. Extra ocular muscles intact. Conjunctivae/corneas clear. Neck: Supple, with full range of motion. No jugular venous distention. Trachea midline. Swelling extending into left neck - minimal at time of exam.   Respiratory:  Normal respiratory effort. Clear to auscultation, bilaterally without Rales/Wheezes/Rhonchi. Cardiovascular:  Regular rhythm, SR / SB, with normal S1/S2 without murmurs, rubs or gallops. Abdomen: Soft, tender RLQ, non-distended with normal bowel sounds. Musculoskeletal:  No clubbing, cyanosis or edema bilaterally. Full range of motion without deformity. Skin: Skin color, texture, turgor normal.  No rashes or lesions. Neurologic:  Neurovascularly intact without any focal sensory/motor deficits.  Cranial nerves: II-XII intact, grossly non-focal.  Psychiatric:  Alert and oriented, thought content appropriate, normal insight  Capillary Refill: Brisk,< 3 seconds   Peripheral Pulses: +2 palpable, equal bilaterally       Labs:     Recent Labs     06/19/19 2000   WBC 8.9   HGB 15.1   HCT 43.5        Recent Labs     06/19/19 2000      K 4.6      CO2 24   BUN 20   CREATININE 1.2   CALCIUM 9.3     Recent Labs     06/19/19 2000   AST 18   ALT 10   BILITOT 0.7   ALKPHOS 80     No results for input(s): INR in the last 72 hours. No results for input(s): Marilee Erazo in the last 72 hours. Urinalysis:      Lab Results   Component Value Date    NITRU Negative 06/19/2019    WBCUA 0-1 02/25/2012    BACTERIA NONE 02/25/2012    RBCUA 0-1 02/25/2012    BLOODU Negative 06/19/2019    SPECGRAV 1.010 06/19/2019    GLUCOSEU Negative 06/19/2019    GLUCOSEU NEGATIVE 02/25/2012       Radiology:   Viewed and documented. XR CHEST PORTABLE   Final Result   No acute cardiopulmonary findings. ASSESSMENT:    Active Hospital Problems    Diagnosis Date Noted    Angio-edema Albertine Jer. 3XXA] 06/19/2019    Tobacco dependence [F17.200] 06/19/2019    Alcohol dependence ([facility] [address] [F10.20] 06/19/2019    Dental caries [K02.9] 06/19/2019    Bradycardia [R00.1] 06/19/2019    HLD (hyperlipidemia) [E78.5] 06/19/2019    Abdominal pain [R10.9] 06/19/2019    Elevated PSA [R97.20] 06/19/2019   . Chest pain rule out acute coronary syndrome  . History of abnormal PSA. PLAN:    1. Angio-edema / facial swelling  -Possibly secondary to prior lisinopril use - discontinued 4/22/2019 per notes versus dental infection.  -No other causative medications identified  -Improvement with pepcid / benadryl / steroids  -Consider possibility of infection / abscess due to dental infection - check pro-calcitonin - consult dentistry  -Admit to ICU for close monitoring due to risk of rapid deterioration and loss of airway  -Check C1 esterase level, alpha-1 antitrypsin, C4 and C1q.  -He will ultimately need allergy testing possibly outpatient.     2.  Chest pain  -Last available stress test 6/5/2012 showing EF=53%, no significant wall motion abnormality, and no defect, infarction, or reversible ischemia  -Serial troponin / EKG  -Continuous cardiac monitoring  -Obtain echo  -Consult cardiology.   -Check d-dimer, consider VQ scan if abnormal, trying to avoid contrast study given current possible angioedema. 3.  Bradycardia  -Continuous cardiac monitoring  -Serial troponin / EKG  -Check UDS and thyroid function.  -Consult cardiology    4. Abdominal pain  -RLQ pain  -Ongoing / unchanged for 2 months  -Seen by GI and planned for outpatient EGD / colonoscopy  -Possibly secondary to constipation - reports BM every 2-3 days  -Obtain US RUQ and KUB  -Consider outpatient CT scan - hold for now secondary to possible current allergic reaction    5. HTN  -Continue home medications  -Monitor vital signs    6. HLD  -Continue statin    7. Elevated PSA  -Repeat now - if significantly elevated, consider inpatient urology consult  -No significant dysuria currently    8. Hypothyroid  -Continue levothyroxine and check TSH. 9.  Anxiety / depression  -Continue home medications  -No report of suicidal or homicidal ideation    10. Alcohol dependence  -Monitor for withdrawal symptoms  -Consider CIWA scale    11. Tobacco dependence  -Nicotine patch if requested    #12. History of hepatitis C treated in the past.        DVT Prophylaxis: Lovenox  Diet: No diet orders on file n.p.o. Code Status: Prior FULL    PT/OT Eval Status: as needed    Dispo - inpx/MICU       Ila Bey MD    Thank you Eleazar Estrada MD for the opportunity to be involved in this patient's care.

## 2019-06-20 NOTE — PROGRESS NOTES
I called and spoke with the patient's nurse Malena Granados), he will make sure the patient is NPO after midnight for his ultrasound tomorrow.

## 2019-06-20 NOTE — PROGRESS NOTES
Hospitalist Progress Note      PCP: Radha Lipscomb MD    Date of Admission: 6/19/2019    Chief Complaint:  Facial edema    Hospital Course: Thought to have angioedema vs  Infected tooth , to go to the dental clinic, put in unit to monitor airway**     Subjective: ** no complaints      Medications:  Reviewed    Infusion Medications   Scheduled Medications    clonazePAM  1 mg Oral TID    folic acid, thiamine, multi-vitamin with vitamin K infusion   Intravenous Daily    [START ON 6/23/2019] thiamine  100 mg Oral Daily    [START ON 3/13/5298] folic acid  1 mg Oral Daily    enoxaparin  40 mg Subcutaneous Daily    sodium chloride flush  10 mL Intravenous 2 times per day    cefTRIAXone (ROCEPHIN) IV  1 g Intravenous Q24H    metroNIDAZOLE  500 mg Intravenous Q8H    methylPREDNISolone  40 mg Intravenous Q12H    famotidine (PEPCID) injection  20 mg Intravenous BID    amLODIPine  5 mg Oral Daily    FLUoxetine  30 mg Oral Daily    fluticasone  1 spray Nasal Daily    levothyroxine  112 mcg Oral QAM AC    pravastatin  20 mg Oral Daily     PRN Meds: magnesium hydroxide, ondansetron, sodium chloride flush, LORazepam **OR** LORazepam **OR** LORazepam **OR** LORazepam **OR** LORazepam **OR** LORazepam **OR** LORazepam **OR** LORazepam, acetaminophen      Intake/Output Summary (Last 24 hours) at 6/20/2019 1505  Last data filed at 6/20/2019 1419  Gross per 24 hour   Intake 1583.24 ml   Output 1125 ml   Net 458.24 ml       Exam:    /83   Pulse 61   Temp 98.2 °F (36.8 °C) (Temporal)   Resp 20   Ht 6' 2\" (1.88 m)   Wt 209 lb 6.4 oz (95 kg)   SpO2 95%   BMI 26.89 kg/m²           Gen: *well developed  HEENT: NC/AT, moist mucous membranes, no oropharyngeal erythema or exudate  Left facial edema  Neck: supple, trachea midline, no anterior cervical or SC LAD  Heart:  Normal s1/s2, RRR, no murmurs, gallops, or rubs.    Lungs:  cta * bilaterally,   Abd: bowel sounds present, soft, nontender, nondistended, no masses  Extrem:  No clubbing, cyanosis,  * no ** edema  Skin: no rashes or lesions  Psych: A & O x3  Neuro: grossly intact, moves all four extremities. Capillary Refill: Brisk,< 3 seconds   Peripheral Pulses: +2 palpable, equal bilaterally               Labs:   Recent Labs     06/19/19 2000 06/20/19  0615   WBC 8.9 6.5   HGB 15.1 14.7   HCT 43.5 42.8    186     Recent Labs     06/19/19 2000 06/20/19  0500    138   K 4.6 4.7    101   CO2 24 23   BUN 20 21*   CREATININE 1.2 1.0   CALCIUM 9.3 8.8     Recent Labs     06/19/19 2000 06/20/19  0500   AST 18 16   ALT 10 10   BILITOT 0.7 0.6   ALKPHOS 80 73     No results for input(s): INR in the last 72 hours. Recent Labs     06/19/19  2305 06/20/19  0500   TROPONINI <0.01 <0.01     Recent Labs     06/19/19 2000 06/20/19  0500   AST 18 16   ALT 10 10   BILITOT 0.7 0.6   ALKPHOS 80 73     No results for input(s): LACTA in the last 72 hours. No results found for: Sophy Gonzalez  No results found for: AMMONIA    Assessment:    Active Hospital Problems    Diagnosis Date Noted    Angio-edema [T78. 3XXA] 06/19/2019    Tobacco dependence [F17.200] 06/19/2019    Alcohol dependence ([address] [F10.20] 06/19/2019    Dental caries [K02.9] 06/19/2019    Bradycardia [R00.1] 06/19/2019    HLD (hyperlipidemia) [E78.5] 06/19/2019    Abdominal pain [R10.9] 06/19/2019    Elevated PSA [R97.20] 06/19/2019       Plan:   dental clinic  Cont rocephin   cont norvasc   cont steroids       DVT Prophylaxis: lovenox  Diet: Diet NPO, After Midnight  Diet NPO, After Midnight  Code Status: Full Code    PT/OT Eval Status: *na    Dispo -  home      Electronically signed by Alban Krishna DO on 6/20/2019 at 3:05 PM

## 2019-06-20 NOTE — CONSULTS
[facility]  Internal Medicine Residency Program  History and Physical  MICU    Patient:  Louis León 63 y.o. male MRN: [id number]     Date of Service: 6/20/2019    Hospital Day: 2      Chief complaint: facial swelling   History of Present Illness   The patient is a 63 y.o. male with a PMH of HTN, hypothyroidism, GERD, Anxiety disorder, alcohol abuse presented to the ED due to facial swelling. At about 8 AM patient woke and noted some feeling of thickening of L upper lip, no obvious swelling then. He had breakfast ( toast ) and then took another nap. Upon awakening at around 11 AM, he noted swelling of his L upper lips and L side of his face. This was associated with a feeling of throat tightness and SOB. He denies any change in diet, no noted insect bites, no previous hx or family hx of facial edema/ swelling. Patient was then prompted to proceed to ED. Patient also states that he has dental caries at L upper side of his mouth. He denied any pain or swelling on the area. He denies any fever, chills. Patient also states that he would also have intermittent swelling of his uvula, he has been following an ENT for this which recommended to elective remove it. He also noted intermittent RLQ pain that has been noted for 2 months. He was scheduled to go for EGD/ colonoscopy for this. At the ED, VS were stable saturating on room air. Labs were unremarkable. He received famotidine, solumedrol 125 mg IV and benadryl 50 mg which improved the facial swelling. He was admitted to MICU for further management.      Past Medical History:      Diagnosis Date    Anxiety     Arthritis     Depression     GERD (gastroesophageal reflux disease)     Hepatitis C     HLD (hyperlipidemia) 6/19/2019    Hypertension     Thyroid disease        Past Surgical History:        Procedure Laterality Date    ABDOMEN SURGERY      PYLORIC STENOSIS    ANKLE FRACTURE SURGERY Right [id number]    ANKLE FRACTURE Vitamins (B COMPLEX PO) Take 1 tablet by mouth daily    Historical Provider, MD   traZODone (DESYREL) 100 MG tablet Take 150 mg by mouth nightly  12/20/15   Historical Provider, MD   clonazePAM (KLONOPIN) 1 MG tablet Take 1 tablet by mouth three times daily  Patient taking differently: Take 2 mg by mouth three times daily. 10/26/15   Lorraine Oppenheim, MD       Allergies:  Patient has no known allergies. Social History:   TOBACCO:   reports that he has been smoking cigarettes. He has a 1.50 pack-year smoking history. He has never used smokeless tobacco.  ETOH:   reports that he drinks alcohol. Family History:       Problem Relation Age of Onset    High Blood Pressure Father     Heart Disease Father     High Blood Pressure Sister     High Blood Pressure Brother     High Blood Pressure Sister        REVIEW OF SYSTEMS:    · Constitutional: No fever, no chills, no change in weight; good appetite  · HEENT: No blurred vision, no ear problems, no sore throat, no rhinorrhea. (+) tightness of the throat  · Respiratory: No cough, no sputum production, no pleuritic chest pain, (+)  shortness of breath  · Cardiology: (+) occasional  Chest pain , no dyspnea on exertion, no paroxysmal nocturnal dyspnea, no orthopnea, no palpitation, no leg swelling. · Gastroenterology: No dysphagia, no reflux; (+)  abdominal pain, no nausea or vomiting; no constipation or diarrhea.  No hematochezia   · Genitourinary: No dysuria, no frequency, hesitancy; no hematuria  · Musculoskeletal: no joint pain, no myalgia, no change in range of movement  · Neurology: no focal weakness in extremities, no slurred speech, no double vision, no tingling or numbness sensation  · Endocrinology: no temperature intolerance, no polyphagia, polydipsia or polyuria  · Hematology: no increased bleeding, no bruising, no lymphadenopathy  · Skin: no skin changes noticed by patient  · Psychology: no depressed mood, no suicidal ideation    Physical Exam · Vitals: BP (!) 151/93   Pulse (!) 49   Temp 98 °F (36.7 °C) (Oral)   Resp 18   Ht 6' 2\" (1.88 m)   Wt 212 lb (96.2 kg)   SpO2 96%   BMI 27.22 kg/m²   ·    ·      · General Appearance: alert and oriented to person, place and time, well-developed and well-nourished, in no acute distress  · Skin: warm and dry, no rash or erythema  · Head: (+) facial swelling on L upper lip and extending to the L zygomaticus region  · Mouth: (+) dental L upper maxillary, with buccal swelling on adjacent side with tenderness on palpation  · Neck: no cervical lymphadenopathy (+) tenderness from L zygomatic area towards submandibular region    · Pulmonary/Chest: clear to auscultation bilaterally- no wheezes, rales or rhonchi, normal air movement, no respiratory distress  · Cardiovascular: bradycardic at 40-49s, normal S1 and S2, no gallops, intact distal pulses and no carotid bruits  · Abdomen: soft, distended, (+) RLQ pain direct and rebound tenderness, NABS   · Extremities: no cyanosis and no clubbing  · Musculoskeletal: normal range of motion, no joint swelling, deformity or tenderness  · Neurologic: no cranial nerve deficit and speech normal       Labs and Imaging Studies   Basic Labs  CBC with Differential:    Lab Results   Component Value Date    WBC 8.9 06/19/2019    RBC 5.18 06/19/2019    HGB 15.1 06/19/2019    HCT 43.5 06/19/2019     06/19/2019    MCV 84.0 06/19/2019    MCH 29.2 06/19/2019    MCHC 34.7 06/19/2019    RDW 13.8 06/19/2019    SEGSPCT 61 11/08/2013    LYMPHOPCT 17.2 06/19/2019    MONOPCT 6.3 06/19/2019    BASOPCT 0.6 06/19/2019    MONOSABS 0.56 06/19/2019    LYMPHSABS 1.54 06/19/2019    EOSABS 0.18 06/19/2019    BASOSABS 0.05 06/19/2019     CMP:    Lab Results   Component Value Date     06/19/2019    K 4.6 06/19/2019     06/19/2019    CO2 24 06/19/2019    BUN 20 06/19/2019    CREATININE 1.2 06/19/2019    GFRAA >60 06/19/2019    LABGLOM >60 06/19/2019    GLUCOSE 75 06/19/2019    GLUCOSE 70 no hx in the past per patient but hx of hypothyroidism, on synthroid   7. HTN, on amlodipine 5 mg daily   8. Anxiety disorder  9. Alcohol abuse, states drinks 4x/week, 13 beers/day     Plan:   1. Monitor for progression of swelling, monitor for respiratory depression, currently on RA  2. Send labs for angioedema work-up: for c1 esterase inhibitor, C1q binding assay, alpha-1 antitrypsin  3. Start famotidine 20 mg BID, methylprednisone 40 mg q6, benadryl 50 mg q6  4. Will hold off ATB for now, for procal., ESR and CRP, consult Dental   5. For obtain RUQ US, KUB, will hold off CT abdomen due to the contrast in the setting of recent allergic reaction  6. Trend trop levels, Cardiology consulted, for ECHO   7. HR currently improving, monitor , for TSH and FT4 , continue synthroid   8. Monitor BP levels, continue amlodipine   9. Trazodone held, continue klonipin 1 mg TID and prozac   10. Monitor for withdrawal symptoms, start thiamine and folate supplementation, consider CIWA protocol     PT/OT evaluation: pending   DVT prophylaxis/ GI prophylaxis: lovenox/ pepcid  Disposition: admit to MICU     Meghann Guerra MD, PGY-1   Attending physician: Dr. Francisco Pantoja     I personally saw, examined and provided care for the patient. Radiographs, labs and medication list were reviewed by me independently. I spoke with bedside nursing, therapists and consultants. Critical care services and times documented are independent of procedures and multidisciplinary rounds with Residents. Additionally comprehensive, multidisciplinary rounds were conducted with the MICU team. The case was discussed in detail and plans for care were established. Review of Residents documentation was conducted and revisions were made as appropriate. I agree with the above documented exam, problem list and plan of care. ?  Angioedema with possible tooth infection   doing very well  Work up sent for angioedema   Card seeing   Will follow     Dmitry Venegas

## 2019-06-20 NOTE — CONSULTS
Consult Note    Reason for Consult:  Dental decay/possible etiology of facial swelling. Requesting Physician:  Geronimo Hernandez DO    HISTORY OF PRESENT ILLNESS:    The patient is a 63 y.o. male who presents with hx of substantial UL facial swelling extending into lip. Past Medical History:   Diagnosis Date    Anxiety     Arthritis     Depression     GERD (gastroesophageal reflux disease)     Hepatitis C     HLD (hyperlipidemia) 6/19/2019    Hypertension     Thyroid disease        Past Surgical History:   Procedure Laterality Date    ABDOMEN SURGERY      PYLORIC STENOSIS    ANKLE FRACTURE SURGERY Right [id number]    ANKLE FRACTURE SURGERY Right 01/19/2015    CERVICAL FUSION  3/30/2016    acdf c5-c6    COLONOSCOPY      ENDOSCOPY, COLON, DIAGNOSTIC      EYE SURGERY      FRACTURE SURGERY      HARDWARE REMOVAL Right 7/10/2015    Right    HERNIA REPAIR      OTHER SURGICAL HISTORY  1/18/2016    fluiroscopy guided cervical myelogram with conpated tomography    REPAIR RETINAL TEAR/HOLE Bilateral     UPPER GASTROINTESTINAL ENDOSCOPY         Prior to Admission medications    Medication Sig Start Date End Date Taking? Authorizing Provider   azelastine (ASTELIN) 0.1 % nasal spray 1 spray by Nasal route 2 times daily 1 Spray in each nostril 4/22/19   Bienvenido Oneill DO   amLODIPine (NORVASC) 5 MG tablet Take 1 tablet by mouth daily For blood pressure.  4/22/19   Matt Villalba MD   levothyroxine (SYNTHROID) 112 MCG tablet TAKE 1 TABLET BY MOUTH ONCE DAILY 12/6/18   Matt Villalba MD   pravastatin (PRAVACHOL) 20 MG tablet TAKE 1 TABLET BY MOUTH ONCE DAILY 12/6/18   Matt Villalba MD   lansoprazole (PREVACID) 15 MG delayed release capsule TAKE 1 CAPSULE BY MOUTH ONCE DAILY 12/6/18   Matt Villalba MD   tamsulosin (FLOMAX) 0.4 MG capsule TAKE 1 CAPSULE BY MOUTH ONCE DAILY--(FOR ENLARGED PROSTATE)--- 12/6/18   Matt Villalba MD   ondansetron (ZOFRAN ODT) 4 MG disintegrating tablet Take 1 tablet by mouth every 8 hours as needed for Nausea or Vomiting 12/4/18 12/4/19  Mike Son MD   nicotine (NICODERM CQ) 7 MG/24HR Place 1 patch onto the skin every 24 hours 11/20/18   Carlo Short MD   fluticasone [facility]) 50 MCG/ACT nasal spray 1 spray by Nasal route daily 5/21/18   Carlo Short MD   FLUoxetine (PROZAC) 20 MG tablet Take 30 mg by mouth daily    Historical Provider, MD   lactulose [facility]) 10 GM/15ML solution Take by mouth daily 2/18/17   Historical Provider, MD   Multiple Vitamins-Minerals (MULTIVITAMIN ADULT PO) Take 1 tablet by mouth daily    Historical Provider, MD   B Complex Vitamins (B COMPLEX PO) Take 1 tablet by mouth daily    Historical Provider, MD   traZODone (DESYREL) 100 MG tablet Take 150 mg by mouth nightly  12/20/15   Historical Provider, MD   clonazePAM (KLONOPIN) 1 MG tablet Take 1 tablet by mouth three times daily  Patient taking differently: Take 2 mg by mouth three times daily.   10/26/15   Jose Alfredo Norwood MD       Scheduled Meds:   clonazePAM  1 mg Oral TID    folic acid, thiamine, multi-vitamin with vitamin K infusion   Intravenous Daily    [START ON 6/23/2019] thiamine  100 mg Oral Daily    [START ON 7/55/9333] folic acid  1 mg Oral Daily    enoxaparin  40 mg Subcutaneous Daily    sodium chloride flush  10 mL Intravenous 2 times per day    cefTRIAXone (ROCEPHIN) IV  1 g Intravenous Q24H    metroNIDAZOLE  500 mg Intravenous Q8H    methylPREDNISolone  40 mg Intravenous Q12H    famotidine (PEPCID) injection  20 mg Intravenous BID    amLODIPine  5 mg Oral Daily    FLUoxetine  30 mg Oral Daily    fluticasone  1 spray Nasal Daily    levothyroxine  112 mcg Oral QAM AC    pravastatin  20 mg Oral Daily     Continuous Infusions:  PRN Meds:magnesium hydroxide, ondansetron, sodium chloride flush, LORazepam **OR** LORazepam **OR** LORazepam **OR** LORazepam **OR** LORazepam **OR** LORazepam **OR** LORazepam **OR** LORazepam, acetaminophen    No Known Allergies    Social History     Socioeconomic History    Marital status: Legally      Spouse name: Not on file    Number of children: Not on file    Years of education: Not on file    Highest education level: Not on file   Occupational History    Not on file   Social Needs    Financial resource strain: Not on file    Food insecurity:     Worry: Not on file     Inability: Not on file    Transportation needs:     Medical: Not on file     Non-medical: Not on file   Tobacco Use    Smoking status: Current Some Day Smoker     Packs/day: 0.15     Years: 10.00     Pack years: 1.50     Types: Cigarettes    Smokeless tobacco: Never Used    Tobacco comment: no smoking 24 hours before surgery   Substance and Sexual Activity    Alcohol use:  Yes     Alcohol/week: 0.0 oz     Comment: occasional     Drug use: No    Sexual activity: Yes     Partners: Female   Lifestyle    Physical activity:     Days per week: Not on file     Minutes per session: Not on file    Stress: Not on file   Relationships    Social connections:     Talks on phone: Not on file     Gets together: Not on file     Attends Hinduism service: Not on file     Active member of club or organization: Not on file     Attends meetings of clubs or organizations: Not on file     Relationship status: Not on file    Intimate partner violence:     Fear of current or ex partner: Not on file     Emotionally abused: Not on file     Physically abused: Not on file     Forced sexual activity: Not on file   Other Topics Concern    Not on file   Social History Narrative    Not on file       Family History   Problem Relation Age of Onset    High Blood Pressure Father     Heart Disease Father     High Blood Pressure Sister     High Blood Pressure Brother     High Blood Pressure Sister        Review Of Systems:       Physical Exam:  Vitals:    06/20/19 1014 06/20/19 1126 06/20/19 1237 06/20/19 1315   BP: (!) 111/92 (!) 136/92 129/87 134/88 Pulse: 65 67 67 67   Resp: 29 15 23 17   Temp:  98.3 °F (36.8 °C)     TempSrc:  Temporal     SpO2: 96% 94% 96% 97%   Weight:       Height:               Assessment/Plan:  1. Evaluated pt in ICU, large carious lesions visible on distal and lingual of tooth #14 in UL. Lingering swelling/tenderness in buccal vestibule consistent with dental abscess. 2. Radiograph needed to confirm diagnosis, #14 likely in need of extraction. Pt should be transported to dental clinic for further evaluation and treatment as soon as able. Electronically signed by Rafiq Danielle DMD on 6/20/19 at 1:27 PM    I personally evaluated the patient and agree with assessment and treatment plan for patient. The patient should present to dental clinic as soon as possible for radiographs and possible extraction of # 14 in upper left quadrant.     Electronically signed by Vale Brody DDS on 6/20/2019 at 2:00 PM

## 2019-06-21 ENCOUNTER — APPOINTMENT (OUTPATIENT)
Dept: NON INVASIVE DIAGNOSTICS | Age: 59
DRG: 603 | End: 2019-06-21
Payer: MEDICARE

## 2019-06-21 ENCOUNTER — APPOINTMENT (OUTPATIENT)
Dept: NUCLEAR MEDICINE | Age: 59
DRG: 603 | End: 2019-06-21
Payer: MEDICARE

## 2019-06-21 VITALS
WEIGHT: 209.6 LBS | RESPIRATION RATE: 18 BRPM | SYSTOLIC BLOOD PRESSURE: 121 MMHG | DIASTOLIC BLOOD PRESSURE: 78 MMHG | OXYGEN SATURATION: 96 % | HEART RATE: 56 BPM | BODY MASS INDEX: 26.9 KG/M2 | HEIGHT: 74 IN | TEMPERATURE: 98.2 F

## 2019-06-21 LAB
ALPHA-1 ANTITRYPSIN: 138 MG/DL (ref 90–200)
ANION GAP SERPL CALCULATED.3IONS-SCNC: 10 MMOL/L (ref 7–16)
ANION GAP SERPL CALCULATED.3IONS-SCNC: 13 MMOL/L (ref 7–16)
BASOPHILS ABSOLUTE: 0.01 E9/L (ref 0–0.2)
BASOPHILS RELATIVE PERCENT: 0.1 % (ref 0–2)
BUN BLDV-MCNC: 17 MG/DL (ref 6–20)
BUN BLDV-MCNC: 19 MG/DL (ref 6–20)
C4 COMPLEMENT: 19 MG/DL (ref 10–40)
CALCIUM SERPL-MCNC: 8.9 MG/DL (ref 8.6–10.2)
CALCIUM SERPL-MCNC: 9.1 MG/DL (ref 8.6–10.2)
CHLORIDE BLD-SCNC: 106 MMOL/L (ref 98–107)
CHLORIDE BLD-SCNC: 107 MMOL/L (ref 98–107)
CO2: 20 MMOL/L (ref 22–29)
CO2: 21 MMOL/L (ref 22–29)
CREAT SERPL-MCNC: 0.9 MG/DL (ref 0.7–1.2)
CREAT SERPL-MCNC: 0.9 MG/DL (ref 0.7–1.2)
EOSINOPHILS ABSOLUTE: 0 E9/L (ref 0.05–0.5)
EOSINOPHILS RELATIVE PERCENT: 0 % (ref 0–6)
GFR AFRICAN AMERICAN: >60
GFR AFRICAN AMERICAN: >60
GFR NON-AFRICAN AMERICAN: >60 ML/MIN/1.73
GFR NON-AFRICAN AMERICAN: >60 ML/MIN/1.73
GLUCOSE BLD-MCNC: 138 MG/DL (ref 74–99)
GLUCOSE BLD-MCNC: 140 MG/DL (ref 74–99)
HCT VFR BLD CALC: 40.5 % (ref 37–54)
HEMOGLOBIN: 13.9 G/DL (ref 12.5–16.5)
IMMATURE GRANULOCYTES #: 0.05 E9/L
IMMATURE GRANULOCYTES %: 0.5 % (ref 0–5)
LV EF: 50 %
LVEF MODALITY: NORMAL
LYMPHOCYTES ABSOLUTE: 0.84 E9/L (ref 1.5–4)
LYMPHOCYTES RELATIVE PERCENT: 9.1 % (ref 20–42)
MCH RBC QN AUTO: 29.1 PG (ref 26–35)
MCHC RBC AUTO-ENTMCNC: 34.3 % (ref 32–34.5)
MCV RBC AUTO: 84.7 FL (ref 80–99.9)
MONOCYTES ABSOLUTE: 0.7 E9/L (ref 0.1–0.95)
MONOCYTES RELATIVE PERCENT: 7.6 % (ref 2–12)
MRSA CULTURE ONLY: NORMAL
NEUTROPHILS ABSOLUTE: 7.67 E9/L (ref 1.8–7.3)
NEUTROPHILS RELATIVE PERCENT: 82.7 % (ref 43–80)
PDW BLD-RTO: 13.9 FL (ref 11.5–15)
PLATELET # BLD: 160 E9/L (ref 130–450)
PMV BLD AUTO: 12.2 FL (ref 7–12)
POTASSIUM SERPL-SCNC: 4.4 MMOL/L (ref 3.5–5)
POTASSIUM SERPL-SCNC: 4.6 MMOL/L (ref 3.5–5)
RBC # BLD: 4.78 E12/L (ref 3.8–5.8)
SODIUM BLD-SCNC: 137 MMOL/L (ref 132–146)
SODIUM BLD-SCNC: 140 MMOL/L (ref 132–146)
WBC # BLD: 9.3 E9/L (ref 4.5–11.5)

## 2019-06-21 PROCEDURE — G0378 HOSPITAL OBSERVATION PER HR: HCPCS

## 2019-06-21 PROCEDURE — 2580000003 HC RX 258

## 2019-06-21 PROCEDURE — 80048 BASIC METABOLIC PNL TOTAL CA: CPT

## 2019-06-21 PROCEDURE — A9500 TC99M SESTAMIBI: HCPCS | Performed by: RADIOLOGY

## 2019-06-21 PROCEDURE — 93017 CV STRESS TEST TRACING ONLY: CPT

## 2019-06-21 PROCEDURE — 78452 HT MUSCLE IMAGE SPECT MULT: CPT

## 2019-06-21 PROCEDURE — 96366 THER/PROPH/DIAG IV INF ADDON: CPT

## 2019-06-21 PROCEDURE — 6360000002 HC RX W HCPCS: Performed by: INTERNAL MEDICINE

## 2019-06-21 PROCEDURE — 85025 COMPLETE CBC W/AUTO DIFF WBC: CPT

## 2019-06-21 PROCEDURE — 6360000002 HC RX W HCPCS: Performed by: FAMILY MEDICINE

## 2019-06-21 PROCEDURE — 2500000003 HC RX 250 WO HCPCS: Performed by: FAMILY MEDICINE

## 2019-06-21 PROCEDURE — 6370000000 HC RX 637 (ALT 250 FOR IP): Performed by: INTERNAL MEDICINE

## 2019-06-21 PROCEDURE — 86160 COMPLEMENT ANTIGEN: CPT

## 2019-06-21 PROCEDURE — 36415 COLL VENOUS BLD VENIPUNCTURE: CPT

## 2019-06-21 PROCEDURE — 2580000003 HC RX 258: Performed by: INTERNAL MEDICINE

## 2019-06-21 PROCEDURE — 96376 TX/PRO/DX INJ SAME DRUG ADON: CPT

## 2019-06-21 PROCEDURE — 3430000000 HC RX DIAGNOSTIC RADIOPHARMACEUTICAL: Performed by: RADIOLOGY

## 2019-06-21 PROCEDURE — 93018 CV STRESS TEST I&R ONLY: CPT | Performed by: INTERNAL MEDICINE

## 2019-06-21 PROCEDURE — 2500000003 HC RX 250 WO HCPCS: Performed by: INTERNAL MEDICINE

## 2019-06-21 PROCEDURE — 99231 SBSQ HOSP IP/OBS SF/LOW 25: CPT | Performed by: INTERNAL MEDICINE

## 2019-06-21 PROCEDURE — 2580000003 HC RX 258: Performed by: FAMILY MEDICINE

## 2019-06-21 PROCEDURE — 93016 CV STRESS TEST SUPVJ ONLY: CPT | Performed by: INTERNAL MEDICINE

## 2019-06-21 RX ORDER — CEFUROXIME AXETIL 250 MG/1
250 TABLET ORAL 2 TIMES DAILY
Qty: 14 TABLET | Refills: 0 | Status: SHIPPED | OUTPATIENT
Start: 2019-06-21 | End: 2019-06-28

## 2019-06-21 RX ORDER — METRONIDAZOLE 500 MG/1
500 TABLET ORAL 2 TIMES DAILY
Qty: 14 TABLET | Refills: 0 | Status: SHIPPED | OUTPATIENT
Start: 2019-06-21 | End: 2019-06-28

## 2019-06-21 RX ORDER — SODIUM CHLORIDE 0.9 % (FLUSH) 0.9 %
10 SYRINGE (ML) INJECTION PRN
Status: DISCONTINUED | OUTPATIENT
Start: 2019-06-21 | End: 2019-06-21 | Stop reason: HOSPADM

## 2019-06-21 RX ADMIN — PRAVASTATIN SODIUM 20 MG: 20 TABLET ORAL at 11:29

## 2019-06-21 RX ADMIN — FLUOXETINE 30 MG: 10 TABLET, FILM COATED ORAL at 08:33

## 2019-06-21 RX ADMIN — LORAZEPAM 2 MG: 1 TABLET ORAL at 16:33

## 2019-06-21 RX ADMIN — METRONIDAZOLE 500 MG: 500 INJECTION, SOLUTION INTRAVENOUS at 11:33

## 2019-06-21 RX ADMIN — CEFTRIAXONE SODIUM 1 G: 1 INJECTION, POWDER, FOR SOLUTION INTRAMUSCULAR; INTRAVENOUS at 11:29

## 2019-06-21 RX ADMIN — Medication 10 ML: at 02:50

## 2019-06-21 RX ADMIN — FAMOTIDINE 20 MG: 10 INJECTION INTRAVENOUS at 11:28

## 2019-06-21 RX ADMIN — WATER 10 ML: 1 INJECTION INTRAMUSCULAR; INTRAVENOUS; SUBCUTANEOUS at 11:30

## 2019-06-21 RX ADMIN — METRONIDAZOLE 500 MG: 500 INJECTION, SOLUTION INTRAVENOUS at 02:50

## 2019-06-21 RX ADMIN — CLONAZEPAM 1 MG: 0.5 TABLET ORAL at 14:29

## 2019-06-21 RX ADMIN — CLONAZEPAM 1 MG: 0.5 TABLET ORAL at 08:32

## 2019-06-21 RX ADMIN — Medication 10 ML: at 11:32

## 2019-06-21 RX ADMIN — Medication 10 ML: at 06:43

## 2019-06-21 RX ADMIN — Medication 35 MILLICURIE: at 10:00

## 2019-06-21 RX ADMIN — THIAMINE HYDROCHLORIDE: 100 INJECTION, SOLUTION INTRAMUSCULAR; INTRAVENOUS at 03:32

## 2019-06-21 RX ADMIN — METHYLPREDNISOLONE SODIUM SUCCINATE 40 MG: 40 INJECTION, POWDER, FOR SOLUTION INTRAMUSCULAR; INTRAVENOUS at 06:43

## 2019-06-21 RX ADMIN — Medication 11 MILLICURIE: at 08:16

## 2019-06-21 RX ADMIN — AMLODIPINE BESYLATE 5 MG: 5 TABLET ORAL at 11:28

## 2019-06-21 ASSESSMENT — PAIN SCALES - GENERAL
PAINLEVEL_OUTOF10: 0

## 2019-06-21 ASSESSMENT — PAIN - FUNCTIONAL ASSESSMENT: PAIN_FUNCTIONAL_ASSESSMENT: ACTIVITIES ARE NOT PREVENTED

## 2019-06-21 NOTE — CARE COORDINATION
Patient with PMH of HTN, hypothyroidism, GERD, Anxiety disorder, alcohol abuse presented to the ED due to facial swelling. Per critical care assessment yesterday,  Facial swelling 2/2 angioedema VS dental infection. Work up in progress for angioedema and dental consult. Patient had a stress test today. Cm/Sw following for any discharge needs.   Patel Page RN CM

## 2019-06-21 NOTE — PROGRESS NOTES
Hospitalist Progress Note      PCP: Mohini Dietz MD    Date of Admission: 6/19/2019    Chief Complaint: * facial edema    Hospital Course: *Thought to have angioedema vs  Infected tooth , to go to the dental clinic, put in unit to monitor airway had a dental bryant, had tooth extraction, had a stress today to evaluate the chest pain.  Results pending    **     Subjective: ** no compalints      Medications:  Reviewed    Infusion Medications   Scheduled Medications    clonazePAM  1 mg Oral TID    folic acid, thiamine, multi-vitamin with vitamin K infusion   Intravenous Daily    [START ON 6/23/2019] thiamine  100 mg Oral Daily    [START ON 9/58/2330] folic acid  1 mg Oral Daily    enoxaparin  40 mg Subcutaneous Daily    sodium chloride flush  10 mL Intravenous 2 times per day    cefTRIAXone (ROCEPHIN) IV  1 g Intravenous Q24H    metroNIDAZOLE  500 mg Intravenous Q8H    methylPREDNISolone  40 mg Intravenous Q12H    nicotine  1 patch Transdermal Daily    famotidine (PEPCID) injection  20 mg Intravenous BID    amLODIPine  5 mg Oral Daily    FLUoxetine  30 mg Oral Daily    fluticasone  1 spray Nasal Daily    levothyroxine  112 mcg Oral QAM AC    pravastatin  20 mg Oral Daily     PRN Meds: sodium chloride flush, magnesium hydroxide, ondansetron, sodium chloride flush, LORazepam **OR** LORazepam **OR** LORazepam **OR** LORazepam **OR** LORazepam **OR** LORazepam **OR** LORazepam **OR** LORazepam, acetaminophen      Intake/Output Summary (Last 24 hours) at 6/21/2019 1913  Last data filed at 6/21/2019 1431  Gross per 24 hour   Intake 677 ml   Output --   Net 677 ml       Exam:    /78   Pulse 56   Temp 98.2 °F (36.8 °C) (Temporal)   Resp 18   Ht 6' 2\" (1.88 m)   Wt 209 lb 9.6 oz (95.1 kg)   SpO2 96%   BMI 26.91 kg/m²       Gen: *well developed  HEENT: NC/AT, moist mucous membranes, no oropharyngeal erythema or exudate  Left facial edema  Neck: supple, trachea midline, no anterior cervical or SC LAD  Heart:  Normal s1/s2, RRR, no murmurs, gallops, or rubs. Lungs:  cta * bilaterally,   Abd: bowel sounds present, soft, nontender, nondistended, no masses  Extrem:  No clubbing, cyanosis,  * no ** edema  Skin: no rashes or lesions  Psych: A & O x3  Neuro: grossly intact, moves all four extremities. Capillary Refill: Brisk,< 3 seconds   Peripheral Pulses: +2 palpable, equal bilaterally                 Labs:   Recent Labs     06/19/19 2000 06/20/19  0615 06/21/19  0520   WBC 8.9 6.5 9.3   HGB 15.1 14.7 13.9   HCT 43.5 42.8 40.5    186 160     Recent Labs     06/19/19 2000 06/20/19  0500 06/21/19  0520    138 137  140   K 4.6 4.7 4.6  4.4    101 107  106   CO2 24 23 20*  21*   BUN 20 21* 19  17   CREATININE 1.2 1.0 0.9  0.9   CALCIUM 9.3 8.8 8.9  9.1     Recent Labs     06/19/19 2000 06/20/19  0500   AST 18 16   ALT 10 10   BILITOT 0.7 0.6   ALKPHOS 80 73     No results for input(s): INR in the last 72 hours. Recent Labs     06/19/19  2305 06/20/19  0500   TROPONINI <0.01 <0.01     Recent Labs     06/19/19 2000 06/20/19  0500   AST 18 16   ALT 10 10   BILITOT 0.7 0.6   ALKPHOS 80 73     No results for input(s): LACTA in the last 72 hours. No results found for: Sujatha Tru  No results found for: AMMONIA    Assessment:    Active Hospital Problems    Diagnosis Date Noted    Angio-edema [T78. 3XXA] 06/19/2019    Tobacco dependence [F17.200] 06/19/2019    Alcohol dependence ([facility] [address] [F10.20] 06/19/2019    Dental caries [K02.9] 06/19/2019    Bradycardia [R00.1] 06/19/2019    HLD (hyperlipidemia) [E78.5] 06/19/2019    Abdominal pain [R10.9] 06/19/2019    Elevated PSA [R97.20] 06/19/2019       Plan:  Dc home  Electronically signed by Kathleen Santiago DO on 6/21/2019 at 7:13 PM

## 2019-06-21 NOTE — PROGRESS NOTES
Pt anxious to discharge. States he has a dog at home that is not eating because he is not there. Instructed pt that stress test is done, but they still have to look at images taken after stress. No d/c in at present. Attempting to calm pt.  called for decaf coffee and to take his food order.

## 2019-06-22 NOTE — DISCHARGE SUMMARY
Hospital Medicine Discharge Summary    Patient: Nitin Farley     Gender: male  : 1960   Age: 63 y.o. MRN: [id number]    Code Status: full code    Primary Care Provider: Deepak Boykin MD    Admit Date: 2019   Discharge Date: 2019      Admitting Physician: Ritu Carlton DO  Discharge Physician: Ritu Carlton DO     Discharge Diagnoses: Active Hospital Problems    Diagnosis Date Noted    Angio-edema [T78. 3XXA] 2019    Tobacco dependence [F17.200] 2019    Alcohol dependence ([address] [F10.20] 2019    Dental caries [K02.9] 2019    Bradycardia [R00.1] 2019    HLD (hyperlipidemia) [E78.5] 2019    Abdominal pain [R10.9] 2019    Elevated PSA [R97.20] 2019   dental carries    Hospital Course:    admitted with  facial edema*Thought to have angioedema vs  Infected tooth , to go to the dental clinic, put in unit to monitor airway had a dental bryant, had tooth extraction, had a stress today to evaluate the chest pain. It is unremarkable        Disposition:  Home    Exam:     /78   Pulse 56   Temp 98.2 °F (36.8 °C) (Temporal)   Resp 18   Ht 6' 2\" (1.88 m)   Wt 209 lb 9.6 oz (95.1 kg)   SpO2 96%   BMI 26.91 kg/m²   Gen: *well developed  HEENT: NC/AT, moist mucous membranes, no oropharyngeal erythema or exudate  Left facial edema has decreased  Neck: supple, trachea midline, no anterior cervical or SC LAD  Heart:  Normal s1/s2, RRR, no murmurs, gallops, or rubs.    Lungs:  cta * bilaterally,   Abd: bowel sounds present, soft, nontender, nondistended, no masses  Extrem:  No clubbing, cyanosis,  * no ** edema  Skin: no rashes or lesions  Psych: A & O x3  Neuro: grossly intact, moves all four extremities.    Capillary Refill: Brisk,< 3 seconds   Peripheral Pulses: +2 palpable, equal bilaterally                Consults:     IP CONSULT TO PRIMARY CARE PROVIDER  IP CONSULT TO CRITICAL CARE  IP CONSULT TO DENTIST  IP CONSULT daily for 7 days, Disp-14 tablet, R-0Normal           Discharge Medication List as of 6/21/2019  7:44 PM        Discharge Medication List as of 6/21/2019  7:44 PM      CONTINUE these medications which have NOT CHANGED    Details   azelastine (ASTELIN) 0.1 % nasal spray 1 spray by Nasal route 2 times daily 1 Spray in each nostril, Disp-2 Bottle, R-1Normal      amLODIPine (NORVASC) 5 MG tablet Take 1 tablet by mouth daily For blood pressure., Disp-30 tablet, R-5Normal      levothyroxine (SYNTHROID) 112 MCG tablet TAKE 1 TABLET BY MOUTH ONCE DAILY, Disp-90 tablet, R-1Normal      pravastatin (PRAVACHOL) 20 MG tablet TAKE 1 TABLET BY MOUTH ONCE DAILY, Disp-90 tablet, R-1Normal      lansoprazole (PREVACID) 15 MG delayed release capsule TAKE 1 CAPSULE BY MOUTH ONCE DAILY, Disp-90 capsule, R-1Normal      tamsulosin (FLOMAX) 0.4 MG capsule TAKE 1 CAPSULE BY MOUTH ONCE DAILY--(FOR ENLARGED PROSTATE)---, Disp-90 capsule, R-1Normal      ondansetron (ZOFRAN ODT) 4 MG disintegrating tablet Take 1 tablet by mouth every 8 hours as needed for Nausea or Vomiting, Disp-10 tablet, R-0Print      nicotine (NICODERM CQ) 7 MG/24HR Place 1 patch onto the skin every 24 hours, Disp-30 patch, R-1Normal      fluticasone (FLONASE) 50 MCG/ACT nasal spray 1 spray by Nasal route daily, Disp-3 Bottle, R-0Normal      FLUoxetine (PROZAC) 20 MG tablet Take 30 mg by mouth dailyHistorical Med      lactulose (CHRONULAC) 10 GM/15ML solution Take by mouth daily      B Complex Vitamins (B COMPLEX PO) Take 1 tablet by mouth daily      traZODone (DESYREL) 100 MG tablet Take 150 mg by mouth nightly       clonazePAM (KLONOPIN) 1 MG tablet Take 1 tablet by mouth three times daily, Disp-90 tablet, R-0           Discharge Medication List as of 6/21/2019  7:44 PM      STOP taking these medications       Multiple Vitamins-Minerals (MULTIVITAMIN ADULT PO) Comments:   Reason for Stopping:                Follow-up appointments:  1 week    Provider Follow-up:

## 2019-06-22 NOTE — PROCEDURES
Stress test report    He achieved 7 METS on a Jamari protocol. This ECG no ischemic ECG changes. Assessment  Good exercise capacity  Negative stress ECG for ischemia.   Normal hemodynamics  Perfusion imaging pending breath sounds clear and equal bilaterally.

## 2019-06-22 NOTE — PROGRESS NOTES
Pulmonary [facility]                             Pulmonary Consult/Progress Note :          Chief complaint: facial swelling         Subjective  Has tooth abscess  No facial swelling  No chest pain     Objective     Vitals:    06/21/19 1615   BP: 121/78   Pulse: 56   Resp: 18   Temp: 98.2 °F (36.8 °C)   SpO2: 96%       · General Appearance: alert and oriented to person, place and time, well-developed and well-nourished, in no acute distress  · Skin: warm and dry, no rash or erythema  · Head:improved  facial swelling on L upper lip and extending to the L zygomaticus region  · Mouth: (+) dental L upper maxillary, with buccal swelling on adjacent side with tenderness on palpation  · Neck: no cervical lymphadenopathy (+) tenderness from L zygomatic area towards submandibular region    · Pulmonary/Chest: clear to auscultation bilaterally- no wheezes, rales or rhonchi, normal air movement, no respiratory distress  · Cardiovascular: bradycardic at 40-49s, normal S1 and S2, no gallops, intact distal pulses and no carotid bruits  · Abdomen: soft, distended, (+) RLQ pain direct and rebound tenderness, NABS   · Extremities: no cyanosis and no clubbing  · Musculoskeletal: normal range of motion, no joint swelling, deformity or tenderness  · Neurologic: no cranial nerve deficit and speech normal       Lab :  CBC with Differential:    Lab Results   Component Value Date    WBC 9.3 06/21/2019    RBC 4.78 06/21/2019    HGB 13.9 06/21/2019    HCT 40.5 06/21/2019     06/21/2019    MCV 84.7 06/21/2019    MCH 29.1 06/21/2019    MCHC 34.3 06/21/2019    RDW 13.9 06/21/2019    SEGSPCT 61 11/08/2013    LYMPHOPCT 9.1 06/21/2019    MONOPCT 7.6 06/21/2019    BASOPCT 0.1 06/21/2019    MONOSABS 0.70 06/21/2019    LYMPHSABS 0.84 06/21/2019    EOSABS 0.00 06/21/2019    BASOSABS 0.01 06/21/2019     CMP:    Lab Results   Component Value Date     06/21/2019     06/21/2019    K 4.6 2019    K 4.4 2019    K 4.6 2019     2019     2019    CO2 20 2019    CO2 21 2019    BUN 19 2019    BUN 17 2019    CREATININE 0.9 2019    CREATININE 0.9 2019    GFRAA >60 2019    GFRAA >60 2019    LABGLOM >60 2019    LABGLOM >60 2019    GLUCOSE 138 2019    GLUCOSE 140 2019    GLUCOSE 70 2012    PROT 6.8 2019    LABALBU 4.0 2019    LABALBU 3.6 2012    CALCIUM 8.9 2019    CALCIUM 9.1 2019    BILITOT 0.6 2019    ALKPHOS 73 2019    AST 16 2019    ALT 10 2019     Magnesium:    Lab Results   Component Value Date    MG 2.2 2019     Phosphorus:  No results found for: PHOS  Troponin:    Lab Results   Component Value Date    TROPONINI <0.01 2019     ABG:  No results found for: PH, PCO2, PO2, HCO3, BE, THGB, TCO2, O2SAT  HgBA1c:  No results found for: LABA1C  TSH:    Lab Results   Component Value Date    TSH 1.050 2019       Imaging Studies:     Xr Chest Portable    Result Date: 2019  Patient MRN: [id number] : 1960 Age:  63 years Gender: Male Order Date: 2019 6:45 PM Exam: XR CHEST PORTABLE Number of Images: 1 view Indication:  Angioedema Comparison: 2018 FINDINGS: The lungs are clear. No pleural effusion or pneumothorax is seen. The cardiac silhouette is unremarkable for size. The aorta is unremarkable. Anterior cervical spinal fusion hardware is seen at C6-C7. No acute cardiopulmonary findings. EKG: sinus bradycardia       1. Facial swelling 2/2 angioedema VS dental abscess ,seems more tooth abscess  2. Airway protected  3. No signs of SOB or chest pain or tongue sweling     4. ? Dental infection/ abscess, significant tenderness and inflammation surrounding the infected tooth, no fever episodes, no leukocytosis,dentist to follow   5.  Alcohol abuse, states drinks 4x/week, 13 beers/day   Will follow as needed No airway issues at this time  Can go and follow if needed as OP ,or with his PCP    Anuja Collins

## 2019-06-24 ENCOUNTER — HOSPITAL ENCOUNTER (OUTPATIENT)
Age: 59
Discharge: HOME | End: 2019-06-24
Payer: MEDICARE

## 2019-06-24 ENCOUNTER — CARE COORDINATION (OUTPATIENT)
Dept: CARE COORDINATION | Age: 59
End: 2019-06-24

## 2019-06-24 DIAGNOSIS — E03.4 HYPOTHYROIDISM DUE TO ACQUIRED ATROPHY OF THYROID: ICD-10-CM

## 2019-06-24 DIAGNOSIS — Z12.5 SCREENING FOR MALIGNANT NEOPLASM OF PROSTATE: ICD-10-CM

## 2019-06-24 LAB
ALBUMIN SERPL-MCNC: 4 G/DL (ref 3.5–5.2)
ALP BLD-CCNC: 90 U/L (ref 40–129)
ALT SERPL-CCNC: 25 U/L (ref 0–40)
ANION GAP SERPL CALCULATED.3IONS-SCNC: 8 MMOL/L (ref 7–16)
AST SERPL-CCNC: 27 U/L (ref 0–39)
BASOPHILS ABSOLUTE: 0.04 E9/L (ref 0–0.2)
BASOPHILS RELATIVE PERCENT: 0.6 % (ref 0–2)
BILIRUB SERPL-MCNC: 0.3 MG/DL (ref 0–1.2)
BUN BLDV-MCNC: 15 MG/DL (ref 6–20)
CALCIUM SERPL-MCNC: 8.8 MG/DL (ref 8.6–10.2)
CANNABINOIDS CONF, URINE: >500 NG/ML
CHLORIDE BLD-SCNC: 104 MMOL/L (ref 98–107)
CHOLESTEROL, TOTAL: 145 MG/DL (ref 0–199)
CO2: 27 MMOL/L (ref 22–29)
CREAT SERPL-MCNC: 1 MG/DL (ref 0.7–1.2)
EOSINOPHILS ABSOLUTE: 0.18 E9/L (ref 0.05–0.5)
EOSINOPHILS RELATIVE PERCENT: 2.7 % (ref 0–6)
GFR AFRICAN AMERICAN: >60
GFR NON-AFRICAN AMERICAN: >60 ML/MIN/1.73
GLUCOSE BLD-MCNC: 77 MG/DL (ref 74–99)
HCT VFR BLD CALC: 42.4 % (ref 37–54)
HDLC SERPL-MCNC: 52 MG/DL
HEMOGLOBIN: 14.4 G/DL (ref 12.5–16.5)
IMMATURE GRANULOCYTES #: 0.04 E9/L
IMMATURE GRANULOCYTES %: 0.6 % (ref 0–5)
LDL CHOLESTEROL CALCULATED: 67 MG/DL (ref 0–99)
LYMPHOCYTES ABSOLUTE: 1.82 E9/L (ref 1.5–4)
LYMPHOCYTES RELATIVE PERCENT: 27.2 % (ref 20–42)
MCH RBC QN AUTO: 29.1 PG (ref 26–35)
MCHC RBC AUTO-ENTMCNC: 34 % (ref 32–34.5)
MCV RBC AUTO: 85.7 FL (ref 80–99.9)
MONOCYTES ABSOLUTE: 0.43 E9/L (ref 0.1–0.95)
MONOCYTES RELATIVE PERCENT: 6.4 % (ref 2–12)
NEUTROPHILS ABSOLUTE: 4.17 E9/L (ref 1.8–7.3)
NEUTROPHILS RELATIVE PERCENT: 62.5 % (ref 43–80)
PDW BLD-RTO: 13.9 FL (ref 11.5–15)
PLATELET # BLD: 171 E9/L (ref 130–450)
PMV BLD AUTO: 11.1 FL (ref 7–12)
POTASSIUM SERPL-SCNC: 4.2 MMOL/L (ref 3.5–5)
PROSTATE SPECIFIC ANTIGEN: 1.97 NG/ML (ref 0–4)
RBC # BLD: 4.95 E12/L (ref 3.8–5.8)
SODIUM BLD-SCNC: 139 MMOL/L (ref 132–146)
TOTAL PROTEIN: 6.5 G/DL (ref 6.4–8.3)
TRIGL SERPL-MCNC: 128 MG/DL (ref 0–149)
TSH SERPL DL<=0.05 MIU/L-ACNC: 4.42 UIU/ML (ref 0.27–4.2)
VLDLC SERPL CALC-MCNC: 26 MG/DL
WBC # BLD: 6.7 E9/L (ref 4.5–11.5)

## 2019-06-24 PROCEDURE — 80061 LIPID PANEL: CPT

## 2019-06-24 PROCEDURE — 84443 ASSAY THYROID STIM HORMONE: CPT

## 2019-06-24 PROCEDURE — 85025 COMPLETE CBC W/AUTO DIFF WBC: CPT

## 2019-06-24 PROCEDURE — G0103 PSA SCREENING: HCPCS

## 2019-06-24 PROCEDURE — 36415 COLL VENOUS BLD VENIPUNCTURE: CPT

## 2019-06-24 PROCEDURE — 80053 COMPREHEN METABOLIC PANEL: CPT

## 2019-06-24 NOTE — CARE COORDINATION
[facility] Transitions Initial Follow Up Call    Call within 2 business days of discharge: Yes    Patient: Gianna Murillo Patient : 1960   MRN: [id number]  Reason for Admission: There are no discharge diagnosis documented for the most recent discharge. Discharge Date: 19 RARS: Readmission Risk Score: 12      Last Discharge [facility]       Complaint Diagnosis Description Type Department Provider    19 Angioedema Angioedema, initial encounter . .. ED to Hosp-Admission (Discharged) (ADMITTED) DEYSI 8WE Darnell Chang DO; Caroline HARTMANN Spoke with: Patient    Facility:[facility]    Non-face-to-face services provided:  Spoke with patient identified myself RN TCC with [facility] ([facility]) calling to see how patient is feeling after being discharged from the hospital see if there is anything patient needs, review patient's allergies and medications patient is taking. Patient verbalized understanding and agreed to continue with the call. Obtained and reviewed discharge summary and/or continuity of care documents. Patient states prior to discharge from the hospital patient received discharge instructions which were reviewed with patient at that time. All questions were answered at that time. Patient states he went to the hospital as he initially had tingling in his lips and left side of his face. Patient states later he had swelling of his entire face left side of face more swollen than right. Patient states while in the hospital he was informed the cause of the symptoms was due to an abscessed tooth in his left upper jaw patient states the tooth was removed. Patient denies any swelling of the face or tingling of the lips or left side of the face.       Care Transitions 24 Hour Call    Do you have any ongoing symptoms?:  No  Do you have a copy of your discharge instructions?:  Yes  Do you have all of your prescriptions and are they filled?:  Yes  Were you discharged with any Home Care or Post Acute Services:  No  Do you feel like you have everything you need to keep you well at home?:  Yes  Care Transitions Interventions       Allergies and medications reviewed with patient. Discharge Medications (as per current medication list/AVS):  There are NEW medications for you. START taking them after you leave the hospital:     cefUROXime (CEFTIN) 250 MG tablet Take 1 tablet by mouth 2 times daily for 7 days- patient states is taking      metroNIDAZOLE (FLAGYL) 500 MG tablet Take 1 tablet by mouth 2 times daily for 7 days-patient states is taking     You told us you were taking these medications at home, but the amount or how often you take this medication has CHANGED:  clonazePAM (KLONOPIN) 1 MG tablet Take 1 tablet by mouth three times daily Patient states is NOT TAKING AS PRESCRIBED - is Taking 2 mg by mouth three times daily. )- Patient states is taking differently than instructed on the AVS- \"is taking 2 mg 3 times daily. Patient states is aware of the medication change. Patient states will continue to take the 2 mg 3 times daily as this is prescribed by Tho Kinsey NP     These are medications you told us you were taking at home, CONTINUE taking them after you leave the hospital:   azelastine (ASTELIN) 0.1 % nasal spray 1 spray by Nasal route 2 times daily 1 Spray in each nostril- patient states is taking    amLODIPine (NORVASC) 5 MG tablet Take 1 tablet by mouth daily For blood pressure. - Patient states is taking    levothyroxine (SYNTHROID) 112 MCG tablet TAKE 1 TABLET BY MOUTH ONCE DAILY- patient states is taking    pravastatin (PRAVACHOL) 20 MG tablet TAKE 1 TABLET BY MOUTH ONCE DAILY- patient states is taking    lansoprazole (PREVACID) 15 MG delayed release capsule TAKE 1 CAPSULE BY MOUTH ONCE DAILY- patient states is taking    tamsulosin (FLOMAX) 0.4 MG capsule TAKE 1 CAPSULE BY MOUTH ONCE DAILY--(FOR ENLARGED PROSTATE)- patient states is taking    ondansetron [facility] ODT) 4 MG disintegrating tablet Take 1 tablet by mouth every 8 hours as needed for Nausea or Vomiting- patient states is taking    nicotine (NICODERM CQ) 7 MG/24HR Place 1 patch onto the skin every 24 hours- patient states is taking    FLUoxetine (PROZAC) 20 MG tablet Take 30 mg by mouth daily-patient states is taking    lactulose (CHRONULAC) 10 GM/15ML solution Take by mouth dailyNOT TAKING AS PRESCRIBED- patient states takes daily as needed    B Complex Vitamins (B COMPLEX PO) Take 1 tablet by mouth daily-patient states is taking    traZODone (DESYREL) 100 MG tablet Take 150 mg by mouth nightly - patient states is taking         fluticasone (FLONASE) 50 MCG/ACT nasal spray 1 spray by Nasal route daily- NOT TAKING AS PRESCRIBED- patient states is taking 1 spray daily as needed         These are the medications you have told us you were taking at home, STOP taking them after you leave the hospital:  Multivitamin Adult- patient confirmed is not taking        Keep scheduled appointments as discussed and listed below:   Follow Up  Future Appointments   Date Time Provider Edmond Stevens   6/27/2019 10:00 AM Lamount Peaches, MD Melva [facility]   7/22/2019  8:30 AM DO Danilo Hilton [facility]       Helen Abbasi RN

## 2019-06-24 NOTE — CARE COORDINATION
Spoke with Lissett Navarro discussed patient discharged from the hospital on 6/21/19, [address] Problem not listed. RN TCC noted patient has an already existing  20 minute office visit, follow up appointment 6 month on 6/27/19 at 10 am.  Please follow up with patient to schedule a Transitional Care visit within 7 days prior to discharge from the hospital. Lissett Skill states will change the 6/27/19 office visit to a Transitional Care visit.

## 2019-06-26 LAB
7-AMINOCLONAZEPAM, URINE: 482 NG/ML
ALPHA-HYDROXYALPRAZOLAM, URINE: <5 NG/ML
ALPHA-HYDROXYMIDAZOLAM, URINE: <20 NG/ML
ALPRAZOLAM, URINE: <5 NG/ML
CHLORDIAZEPOXIDE, URINE: <20 NG/ML
CLONAZEPAM, URINE: 16 NG/ML
DIAZEPAM, URINE: <20 NG/ML
LORAZEPAM, URINE: <20 NG/ML
MIDAZOLAM, URINE: <20 NG/ML
NORDIAZEPAM, URINE: <20 NG/ML
OXAZEPAM, URINE: <20 NG/ML
TEMAZEPAM, URINE: <20 NG/ML

## 2019-06-27 ENCOUNTER — OFFICE VISIT (OUTPATIENT)
Dept: FAMILY MEDICINE CLINIC | Age: 59
End: 2019-06-27
Payer: MEDICARE

## 2019-06-27 VITALS
BODY MASS INDEX: 27.17 KG/M2 | DIASTOLIC BLOOD PRESSURE: 70 MMHG | WEIGHT: 205 LBS | TEMPERATURE: 98.4 F | HEIGHT: 73 IN | SYSTOLIC BLOOD PRESSURE: 108 MMHG | HEART RATE: 82 BPM | OXYGEN SATURATION: 98 %

## 2019-06-27 DIAGNOSIS — R22.0 FACIAL SWELLING: ICD-10-CM

## 2019-06-27 DIAGNOSIS — E03.4 HYPOTHYROIDISM DUE TO ACQUIRED ATROPHY OF THYROID: ICD-10-CM

## 2019-06-27 DIAGNOSIS — R07.9 CHEST PAIN, UNSPECIFIED TYPE: ICD-10-CM

## 2019-06-27 DIAGNOSIS — K04.7 INFECTED TOOTH: Primary | ICD-10-CM

## 2019-06-27 PROCEDURE — 99495 TRANSJ CARE MGMT MOD F2F 14D: CPT | Performed by: FAMILY MEDICINE

## 2019-06-27 PROCEDURE — 1111F DSCHRG MED/CURRENT MED MERGE: CPT | Performed by: FAMILY MEDICINE

## 2019-06-27 RX ORDER — LEVOTHYROXINE SODIUM 112 UG/1
TABLET ORAL
Qty: 90 TABLET | Refills: 1 | Status: SHIPPED | OUTPATIENT
Start: 2019-06-27 | End: 2019-12-27 | Stop reason: SDUPTHER

## 2019-06-27 RX ORDER — PRAVASTATIN SODIUM 20 MG
TABLET ORAL
Qty: 90 TABLET | Refills: 1 | Status: SHIPPED | OUTPATIENT
Start: 2019-06-27 | End: 2019-12-27 | Stop reason: SDUPTHER

## 2019-06-27 RX ORDER — TAMSULOSIN HYDROCHLORIDE 0.4 MG/1
CAPSULE ORAL
Qty: 90 CAPSULE | Refills: 1 | Status: SHIPPED | OUTPATIENT
Start: 2019-06-27 | End: 2019-12-27 | Stop reason: SDUPTHER

## 2019-06-27 RX ORDER — LANSOPRAZOLE 15 MG/1
CAPSULE, DELAYED RELEASE ORAL
Qty: 90 CAPSULE | Refills: 1 | Status: SHIPPED | OUTPATIENT
Start: 2019-06-27 | End: 2019-12-27 | Stop reason: SDUPTHER

## 2019-06-27 NOTE — PROGRESS NOTES
Referral GERD was post-Discharge Transitional Care Management Services or Hospital Follow Up      Tere Thomas   YOB: 1960    Date of Office Visit:  6/27/2019  Date of Hospital Admission: 6/19/19  Date of Hospital Discharge: 6/21/19  Risk of hospital readmission (high >=14%. Medium >=10%) :Readmission Risk Score: 12      Care management risk score Rising risk (score 2-5) and Complex Care (Scores >=6): 4     Non face to face  following discharge, date last encounter closed (first attempt may have been earlier): 6/24/2019  2:21 PM    Call initiated 2 business days of discharge: Yes    Patient Active Problem List   Diagnosis    Depression    Chest pain    Closed bimalleolar fracture of right ankle    Painful orthopaedic hardware ([address]    Essential hypertension    Hypothyroidism due to acquired atrophy of thyroid    Anxiety    Chronic neck pain    Gastroesophageal reflux disease without esophagitis    Closed fracture of one rib of left side    Head injury    Angio-edema    Tobacco dependence    Alcohol dependence ([address]    Dental caries    Bradycardia    HLD (hyperlipidemia)    Abdominal pain    Elevated PSA       No Known Allergies    Medications listed as ordered at the time of discharge from [facility], [facility] Medication Instructions MAK:    Printed on:06/30/19 1134   Medication Information                      amLODIPine (NORVASC) 5 MG tablet  Take 1 tablet by mouth daily For blood pressure.              azelastine (ASTELIN) 0.1 % nasal spray  1 spray by Nasal route 2 times daily 1 Spray in each nostril             B Complex Vitamins (B COMPLEX PO)  Take 1 tablet by mouth daily             clonazePAM (KLONOPIN) 1 MG tablet  Take 1 tablet by mouth three times daily             FLUoxetine (PROZAC) 20 MG tablet  Take 30 mg by mouth daily             fluticasone (FLONASE) 50 MCG/ACT nasal spray  1 spray by Nasal route daily             lactulose (CHRONULAC) 10 GM/15ML solution  Take by mouth daily             lansoprazole (PREVACID) 15 MG delayed release capsule  TAKE 1 CAPSULE BY MOUTH ONCE DAILY             levothyroxine (SYNTHROID) 112 MCG tablet  TAKE 1 TABLET BY MOUTH ONCE DAILY             nicotine (NICODERM CQ) 7 MG/24HR  Place 1 patch onto the skin every 24 hours             ondansetron (ZOFRAN ODT) 4 MG disintegrating tablet  Take 1 tablet by mouth every 8 hours as needed for Nausea or Vomiting             pravastatin (PRAVACHOL) 20 MG tablet  TAKE 1 TABLET BY MOUTH ONCE DAILY             tamsulosin (FLOMAX) 0.4 MG capsule  TAKE 1 CAPSULE BY MOUTH ONCE DAILY--(FOR ENLARGED PROSTATE)---             traZODone (DESYREL) 100 MG tablet  Take 150 mg by mouth nightly                    Medications marked \"taking\" at this time  Outpatient Medications Marked as Taking for the 19 encounter (Office Visit) with Dutch Cronin MD   Medication Sig Dispense Refill    pravastatin (PRAVACHOL) 20 MG tablet TAKE 1 TABLET BY MOUTH ONCE DAILY 90 tablet 1    tamsulosin (FLOMAX) 0.4 MG capsule TAKE 1 CAPSULE BY MOUTH ONCE DAILY--(FOR ENLARGED PROSTATE)--- 90 capsule 1    levothyroxine (SYNTHROID) 112 MCG tablet TAKE 1 TABLET BY MOUTH ONCE DAILY 90 tablet 1    lansoprazole (PREVACID) 15 MG delayed release capsule TAKE 1 CAPSULE BY MOUTH ONCE DAILY 90 capsule 1    [] cefUROXime (CEFTIN) 250 MG tablet Take 1 tablet by mouth 2 times daily for 7 days 14 tablet 0    [] metroNIDAZOLE (FLAGYL) 500 MG tablet Take 1 tablet by mouth 2 times daily for 7 days 14 tablet 0    azelastine (ASTELIN) 0.1 % nasal spray 1 spray by Nasal route 2 times daily 1 Spray in each nostril 2 Bottle 1    amLODIPine (NORVASC) 5 MG tablet Take 1 tablet by mouth daily For blood pressure.  30 tablet 5    ondansetron (ZOFRAN ODT) 4 MG disintegrating tablet Take 1 tablet by mouth every 8 hours as needed for Nausea or Vomiting 10 tablet 0    nicotine (NICODERM CQ) 7 MG/24HR Place 1 patch onto the skin every 24 hours 30 patch 1    fluticasone (FLONASE) 50 MCG/ACT nasal spray 1 spray by Nasal route daily 3 Bottle 0    FLUoxetine (PROZAC) 20 MG tablet Take 30 mg by mouth daily      lactulose (CHRONULAC) 10 GM/15ML solution Take by mouth daily      B Complex Vitamins (B COMPLEX PO) Take 1 tablet by mouth daily      traZODone (DESYREL) 100 MG tablet Take 150 mg by mouth nightly       clonazePAM (KLONOPIN) 1 MG tablet Take 1 tablet by mouth three times daily (Patient taking differently: Take 2 mg by mouth three times daily. ) 90 tablet 0        Medications patient taking as of now reconciled against medications ordered at time of hospital discharge: Yes    Chief Complaint   Patient presents with    Care Management     TCM       History of Present illness - Follow up of Hospital diagnosis(es): Dental infection/abscess, chest pain, facial swelling    Inpatient course: Discharge summary reviewed- see chart. Thought to initially have angioedema, ultimately turned out to have a dental abscess. Tooth was removed. Treated with antibiotics. Condition improved. Concurrently, also had some chest pain. And cardiac work-up which was reassuring including stress test.    Interval history/Current status: Since discharge, patient has been feeling well. He continues on appropriate antibiotics without side effects. A comprehensive review of systems was negative except for what was noted in the HPI. Vitals:    06/27/19 0958   BP: 108/70   Site: Right Upper Arm   Position: Sitting   Cuff Size: Large Adult   Pulse: 82   Temp: 98.4 °F (36.9 °C)   TempSrc: Oral   SpO2: 98%   Weight: 205 lb (93 kg)   Height: 6' 1\" (1.854 m)     Body mass index is 27.05 kg/m².    Wt Readings from Last 3 Encounters:   06/27/19 205 lb (93 kg)   06/21/19 209 lb 9.6 oz (95.1 kg)   04/22/19 217 lb (98.4 kg)     BP Readings from Last 3 Encounters:   06/27/19 108/70   06/21/19 121/78   04/22/19 (!) 139/90        Physical Exam:  General Appearance: alert and oriented to person, place and time, well developed and well- nourished, in no acute distress  Skin: warm and dry, no rash or erythema  Head: normocephalic and atraumatic  Eyes: pupils equal, round, and reactive to light, extraocular eye movements intact, conjunctivae normal  ENT: tympanic membrane, external ear and ear canal normal bilaterally, nose without deformity, nasal mucosa and turbinates normal without polyps. Tooth removal site appears to be healing well. Sutures are in place. No drainage. No pus. No gingival erythema. Neck: supple and non-tender without mass, no thyromegaly or thyroid nodules, no cervical lymphadenopathy  Pulmonary/Chest: clear to auscultation bilaterally- no wheezes, rales or rhonchi, normal air movement, no respiratory distress  Cardiovascular: normal rate, regular rhythm, normal S1 and S2, no murmurs, rubs, clicks, or gallops, distal pulses intact, no carotid bruits  Abdomen: soft, non-tender, non-distended, normal bowel sounds, no masses or organomegaly  Extremities: no cyanosis, clubbing or edema  Musculoskeletal: normal range of motion, no joint swelling, deformity or tenderness  Neurologic: reflexes normal and symmetric, no cranial nerve deficit, gait, coordination and speech normal    Assessment/Plan:  1. Infected tooth  Source of his major facial swelling. Tooth now removed. Continues antibiotics appropriately without side effects. Follow-up with dental clinic as needed  - MO DISCHARGE MEDS RECONCILED W/ CURRENT OUTPATIENT MED LIST    2. Facial swelling  Resolved. This was NOT angioedema as originally thought. - MO DISCHARGE MEDS RECONCILED W/ CURRENT OUTPATIENT MED LIST    3. Chest pain, unspecified type  No recurrence since discharge. He did not think that this was very significant even while he was hospitalized. Cardiac stress testing was reviewed and negative. - MO DISCHARGE MEDS RECONCILED W/ CURRENT OUTPATIENT MED LIST    4.  Hypothyroidism due to acquired atrophy of thyroid  Has been feeling well. Generally euthyroid symptoms. TSH was checked twice recently, and he had some variable levels there, which is unusual.  Overall though over the past 2 years, TSH has been fairly stable. We will continue the same dose of levothyroxine.  - levothyroxine (SYNTHROID) 112 MCG tablet; TAKE 1 TABLET BY MOUTH ONCE DAILY  Dispense: 90 tablet;  Refill: 1        Medical Decision Making: moderate complexity

## 2019-07-05 ENCOUNTER — TELEPHONE (OUTPATIENT)
Dept: CARDIOLOGY CLINIC | Age: 59
End: 2019-07-05

## 2019-07-16 ENCOUNTER — TELEPHONE (OUTPATIENT)
Dept: FAMILY MEDICINE CLINIC | Age: 59
End: 2019-07-16

## 2019-07-16 DIAGNOSIS — M25.532 LEFT WRIST PAIN: Primary | ICD-10-CM

## 2019-07-17 ENCOUNTER — HOSPITAL ENCOUNTER (OUTPATIENT)
Age: 59
Discharge: HOME | End: 2019-07-19
Payer: MEDICARE

## 2019-07-17 ENCOUNTER — HOSPITAL ENCOUNTER (OUTPATIENT)
Dept: GENERAL RADIOLOGY | Age: 59
Discharge: HOME | End: 2019-07-19
Payer: MEDICARE

## 2019-07-17 DIAGNOSIS — M25.532 LEFT WRIST PAIN: ICD-10-CM

## 2019-07-17 PROCEDURE — 73110 X-RAY EXAM OF WRIST: CPT

## 2019-07-22 ENCOUNTER — OFFICE VISIT (OUTPATIENT)
Dept: ENT CLINIC | Age: 59
End: 2019-07-22
Payer: MEDICARE

## 2019-07-22 VITALS
WEIGHT: 211 LBS | SYSTOLIC BLOOD PRESSURE: 120 MMHG | OXYGEN SATURATION: 95 % | BODY MASS INDEX: 27.08 KG/M2 | HEIGHT: 74 IN | HEART RATE: 76 BPM | DIASTOLIC BLOOD PRESSURE: 87 MMHG

## 2019-07-22 DIAGNOSIS — J30.1 CHRONIC SEASONAL ALLERGIC RHINITIS DUE TO POLLEN: ICD-10-CM

## 2019-07-22 DIAGNOSIS — H61.23 BILATERAL IMPACTED CERUMEN: Primary | ICD-10-CM

## 2019-07-22 PROCEDURE — G8417 CALC BMI ABV UP PARAM F/U: HCPCS | Performed by: OTOLARYNGOLOGY

## 2019-07-22 PROCEDURE — 3017F COLORECTAL CA SCREEN DOC REV: CPT | Performed by: OTOLARYNGOLOGY

## 2019-07-22 PROCEDURE — 4004F PT TOBACCO SCREEN RCVD TLK: CPT | Performed by: OTOLARYNGOLOGY

## 2019-07-22 PROCEDURE — 99213 OFFICE O/P EST LOW 20 MIN: CPT | Performed by: OTOLARYNGOLOGY

## 2019-07-22 PROCEDURE — G8427 DOCREV CUR MEDS BY ELIG CLIN: HCPCS | Performed by: OTOLARYNGOLOGY

## 2019-07-22 RX ORDER — AZELASTINE 1 MG/ML
1 SPRAY, METERED NASAL 2 TIMES DAILY
Qty: 2 BOTTLE | Refills: 3 | Status: SHIPPED
Start: 2019-07-22 | End: 2020-05-11 | Stop reason: SDUPTHER

## 2019-07-22 RX ORDER — FLUTICASONE PROPIONATE 50 MCG
1 SPRAY, SUSPENSION (ML) NASAL DAILY
Qty: 3 BOTTLE | Refills: 3 | Status: SHIPPED
Start: 2019-07-22 | End: 2020-05-11 | Stop reason: SDUPTHER

## 2019-07-22 ASSESSMENT — ENCOUNTER SYMPTOMS
SHORTNESS OF BREATH: 0
COLOR CHANGE: 0
VOMITING: 0
COUGH: 0
ALLERGIC/IMMUNOLOGIC NEGATIVE: 1
RHINORRHEA: 1
STRIDOR: 0
NAUSEA: 0
EYE REDNESS: 0
EYE DISCHARGE: 0

## 2019-12-09 ENCOUNTER — TELEPHONE (OUTPATIENT)
Dept: FAMILY MEDICINE CLINIC | Age: 59
End: 2019-12-09

## 2019-12-09 DIAGNOSIS — R79.89 ABNORMAL TSH: ICD-10-CM

## 2019-12-09 DIAGNOSIS — R79.89 ELEVATED TSH: Primary | ICD-10-CM

## 2019-12-09 DIAGNOSIS — R94.6 ABNORMAL RESULTS OF THYROID FUNCTION STUDIES: ICD-10-CM

## 2019-12-19 ENCOUNTER — TELEPHONE (OUTPATIENT)
Dept: FAMILY MEDICINE CLINIC | Age: 59
End: 2019-12-19

## 2019-12-26 ENCOUNTER — HOSPITAL ENCOUNTER (OUTPATIENT)
Age: 59
Discharge: HOME | End: 2019-12-26
Payer: MEDICARE

## 2019-12-26 DIAGNOSIS — R94.6 ABNORMAL RESULTS OF THYROID FUNCTION STUDIES: ICD-10-CM

## 2019-12-26 DIAGNOSIS — R79.89 ELEVATED TSH: ICD-10-CM

## 2019-12-26 DIAGNOSIS — R79.89 ABNORMAL TSH: ICD-10-CM

## 2019-12-26 LAB — TSH SERPL DL<=0.05 MIU/L-ACNC: 4.49 UIU/ML (ref 0.27–4.2)

## 2019-12-26 PROCEDURE — 84443 ASSAY THYROID STIM HORMONE: CPT

## 2019-12-26 PROCEDURE — 36415 COLL VENOUS BLD VENIPUNCTURE: CPT

## 2019-12-27 ENCOUNTER — OFFICE VISIT (OUTPATIENT)
Dept: FAMILY MEDICINE CLINIC | Age: 59
End: 2019-12-27
Payer: MEDICARE

## 2019-12-27 ENCOUNTER — TELEPHONE (OUTPATIENT)
Dept: FAMILY MEDICINE CLINIC | Age: 59
End: 2019-12-27

## 2019-12-27 VITALS
OXYGEN SATURATION: 94 % | DIASTOLIC BLOOD PRESSURE: 64 MMHG | HEIGHT: 74 IN | HEART RATE: 72 BPM | WEIGHT: 208.2 LBS | SYSTOLIC BLOOD PRESSURE: 122 MMHG | BODY MASS INDEX: 26.72 KG/M2 | TEMPERATURE: 97.5 F

## 2019-12-27 DIAGNOSIS — I10 ESSENTIAL HYPERTENSION: Primary | ICD-10-CM

## 2019-12-27 DIAGNOSIS — F17.210 CIGARETTE NICOTINE DEPENDENCE WITHOUT COMPLICATION: ICD-10-CM

## 2019-12-27 DIAGNOSIS — R09.81 SINUS CONGESTION: ICD-10-CM

## 2019-12-27 DIAGNOSIS — E03.4 HYPOTHYROIDISM DUE TO ACQUIRED ATROPHY OF THYROID: ICD-10-CM

## 2019-12-27 PROCEDURE — 3017F COLORECTAL CA SCREEN DOC REV: CPT | Performed by: FAMILY MEDICINE

## 2019-12-27 PROCEDURE — G8482 FLU IMMUNIZE ORDER/ADMIN: HCPCS | Performed by: FAMILY MEDICINE

## 2019-12-27 PROCEDURE — G8427 DOCREV CUR MEDS BY ELIG CLIN: HCPCS | Performed by: FAMILY MEDICINE

## 2019-12-27 PROCEDURE — 99214 OFFICE O/P EST MOD 30 MIN: CPT | Performed by: FAMILY MEDICINE

## 2019-12-27 PROCEDURE — 4004F PT TOBACCO SCREEN RCVD TLK: CPT | Performed by: FAMILY MEDICINE

## 2019-12-27 PROCEDURE — G8417 CALC BMI ABV UP PARAM F/U: HCPCS | Performed by: FAMILY MEDICINE

## 2019-12-27 RX ORDER — FLUOXETINE 10 MG/1
CAPSULE ORAL
Refills: 0 | COMMUNITY
Start: 2019-09-23

## 2019-12-27 RX ORDER — AMLODIPINE BESYLATE 5 MG/1
5 TABLET ORAL DAILY
Qty: 90 TABLET | Refills: 1
Start: 2019-12-27 | End: 2020-06-02

## 2019-12-27 RX ORDER — FLUOXETINE HYDROCHLORIDE 20 MG/1
CAPSULE ORAL
Refills: 0 | COMMUNITY
Start: 2019-09-23

## 2019-12-27 RX ORDER — TRAZODONE HYDROCHLORIDE 150 MG/1
300 TABLET ORAL
COMMUNITY
Start: 2019-12-21

## 2019-12-27 RX ORDER — LANSOPRAZOLE 15 MG/1
CAPSULE, DELAYED RELEASE ORAL
Qty: 90 CAPSULE | Refills: 1 | Status: SHIPPED
Start: 2019-12-27 | End: 2022-03-09 | Stop reason: SDUPTHER

## 2019-12-27 RX ORDER — MONTELUKAST SODIUM 10 MG/1
10 TABLET ORAL NIGHTLY
Qty: 30 TABLET | Refills: 0 | Status: SHIPPED
Start: 2019-12-27 | End: 2020-06-29

## 2019-12-27 RX ORDER — PRAVASTATIN SODIUM 20 MG
TABLET ORAL
Qty: 90 TABLET | Refills: 1 | Status: SHIPPED
Start: 2019-12-27 | End: 2020-08-07 | Stop reason: SDUPTHER

## 2019-12-27 RX ORDER — LEVOTHYROXINE SODIUM 112 UG/1
TABLET ORAL
Qty: 90 TABLET | Refills: 1 | Status: SHIPPED
Start: 2019-12-27 | End: 2020-06-29

## 2019-12-27 RX ORDER — CLONAZEPAM 2 MG/1
TABLET ORAL
COMMUNITY
Start: 2019-12-17

## 2019-12-27 RX ORDER — TAMSULOSIN HYDROCHLORIDE 0.4 MG/1
CAPSULE ORAL
Qty: 90 CAPSULE | Refills: 1 | Status: SHIPPED
Start: 2019-12-27 | End: 2020-06-29

## 2019-12-27 RX ORDER — LISINOPRIL 30 MG/1
TABLET ORAL
COMMUNITY
End: 2019-12-27

## 2019-12-27 RX ORDER — MULTIVITAMIN
TABLET ORAL
COMMUNITY

## 2019-12-28 PROBLEM — R10.9 ABDOMINAL PAIN: Status: RESOLVED | Noted: 2019-06-19 | Resolved: 2019-12-28

## 2019-12-28 PROBLEM — R00.1 BRADYCARDIA: Status: RESOLVED | Noted: 2019-06-19 | Resolved: 2019-12-28

## 2019-12-28 PROBLEM — S09.90XA HEAD INJURY: Status: RESOLVED | Noted: 2018-12-04 | Resolved: 2019-12-28

## 2019-12-28 PROBLEM — R97.20 ELEVATED PSA: Status: RESOLVED | Noted: 2019-06-19 | Resolved: 2019-12-28

## 2019-12-28 PROBLEM — T78.3XXA ANGIO-EDEMA: Status: RESOLVED | Noted: 2019-06-19 | Resolved: 2019-12-28

## 2020-02-22 ENCOUNTER — OFFICE VISIT (OUTPATIENT)
Dept: FAMILY MEDICINE CLINIC | Age: 60
End: 2020-02-22
Payer: MEDICARE

## 2020-02-22 VITALS
BODY MASS INDEX: 26.59 KG/M2 | SYSTOLIC BLOOD PRESSURE: 124 MMHG | HEART RATE: 78 BPM | DIASTOLIC BLOOD PRESSURE: 80 MMHG | OXYGEN SATURATION: 94 % | HEIGHT: 74 IN | TEMPERATURE: 97.5 F | WEIGHT: 207.2 LBS

## 2020-02-22 LAB
INFLUENZA A ANTIBODY: NORMAL
INFLUENZA B ANTIBODY: NORMAL

## 2020-02-22 PROCEDURE — 3017F COLORECTAL CA SCREEN DOC REV: CPT | Performed by: INTERNAL MEDICINE

## 2020-02-22 PROCEDURE — G8482 FLU IMMUNIZE ORDER/ADMIN: HCPCS | Performed by: INTERNAL MEDICINE

## 2020-02-22 PROCEDURE — 99213 OFFICE O/P EST LOW 20 MIN: CPT | Performed by: INTERNAL MEDICINE

## 2020-02-22 PROCEDURE — 87804 INFLUENZA ASSAY W/OPTIC: CPT | Performed by: INTERNAL MEDICINE

## 2020-02-22 PROCEDURE — G8427 DOCREV CUR MEDS BY ELIG CLIN: HCPCS | Performed by: INTERNAL MEDICINE

## 2020-02-22 PROCEDURE — 4004F PT TOBACCO SCREEN RCVD TLK: CPT | Performed by: INTERNAL MEDICINE

## 2020-02-22 PROCEDURE — G8417 CALC BMI ABV UP PARAM F/U: HCPCS | Performed by: INTERNAL MEDICINE

## 2020-02-22 RX ORDER — PREDNISONE 10 MG/1
TABLET ORAL
Qty: 12 TABLET | Refills: 0 | Status: SHIPPED | OUTPATIENT
Start: 2020-02-22 | End: 2020-02-28

## 2020-02-22 RX ORDER — DOXYCYCLINE HYCLATE 100 MG
100 TABLET ORAL 2 TIMES DAILY
Qty: 20 TABLET | Refills: 0 | Status: SHIPPED | OUTPATIENT
Start: 2020-02-22 | End: 2020-03-03

## 2020-02-22 ASSESSMENT — ENCOUNTER SYMPTOMS
CHEST TIGHTNESS: 0
SORE THROAT: 1
SINUS PAIN: 0
EYES NEGATIVE: 1
SINUS PRESSURE: 1
WHEEZING: 1
COUGH: 1
ABDOMINAL PAIN: 0
SHORTNESS OF BREATH: 0
DIARRHEA: 1

## 2020-02-23 NOTE — PROGRESS NOTES
CAPSULE BY MOUTH ONCE DAILY, Disp: 90 capsule, Rfl: 1    levothyroxine (SYNTHROID) 112 MCG tablet, TAKE 1 TABLET BY MOUTH ONCE DAILY, Disp: 90 tablet, Rfl: 1    pravastatin (PRAVACHOL) 20 MG tablet, TAKE 1 TABLET BY MOUTH ONCE DAILY, Disp: 90 tablet, Rfl: 1    tamsulosin (FLOMAX) 0.4 MG capsule, TAKE 1 CAPSULE BY MOUTH ONCE DAILY--(FOR ENLARGED PROSTATE)---, Disp: 90 capsule, Rfl: 1    amLODIPine (NORVASC) 5 MG tablet, Take 1 tablet by mouth daily, Disp: 90 tablet, Rfl: 1    azelastine (ASTELIN) 0.1 % nasal spray, 1 spray by Nasal route 2 times daily 1 Spray in each nostril, Disp: 2 Bottle, Rfl: 3    fluticasone (FLONASE) 50 MCG/ACT nasal spray, 1 spray by Nasal route daily, Disp: 3 Bottle, Rfl: 3    nicotine (NICODERM CQ) 7 MG/24HR, Place 1 patch onto the skin every 24 hours, Disp: 30 patch, Rfl: 1    B Complex Vitamins (B COMPLEX PO), Take 1 tablet by mouth daily, Disp: , Rfl:   No Known Allergies    Past Medical History:   Diagnosis Date    Anxiety     Arthritis     Depression     GERD (gastroesophageal reflux disease)     Hepatitis C     HLD (hyperlipidemia) 6/19/2019    Hypertension     Thyroid disease      Past Surgical History:   Procedure Laterality Date    ABDOMEN SURGERY      PYLORIC STENOSIS    ANKLE FRACTURE SURGERY Right [id number]    ANKLE FRACTURE SURGERY Right 01/19/2015    CERVICAL FUSION  3/30/2016    acdf c5-c6    COLONOSCOPY      ENDOSCOPY, COLON, DIAGNOSTIC      EYE SURGERY      FRACTURE SURGERY      HARDWARE REMOVAL Right 7/10/2015    Right    HERNIA REPAIR      OTHER SURGICAL HISTORY  1/18/2016    fluiroscopy guided cervical myelogram with conpated tomography    REPAIR RETINAL TEAR/HOLE Bilateral     UPPER GASTROINTESTINAL ENDOSCOPY       Family History   Problem Relation Age of Onset    High Blood Pressure Father     Heart Disease Father     High Blood Pressure Sister     High Blood Pressure Brother     High Blood Pressure Sister      Social History

## 2020-03-23 ENCOUNTER — TELEPHONE (OUTPATIENT)
Dept: FAMILY MEDICINE CLINIC | Age: 60
End: 2020-03-23

## 2020-03-23 RX ORDER — GUAIFENESIN AND DEXTROMETHORPHAN HYDROBROMIDE 600; 30 MG/1; MG/1
1 TABLET, EXTENDED RELEASE ORAL 2 TIMES DAILY
Qty: 28 TABLET | Refills: 0 | Status: SHIPPED
Start: 2020-03-23 | End: 2020-06-29

## 2020-03-23 NOTE — TELEPHONE ENCOUNTER
Sent dextromethorphan with mucinex to his pharmacy. Use this instead of the coricidin. Does not sound like any worrisome symptoms right now requiring a visit to the office.

## 2020-03-23 NOTE — TELEPHONE ENCOUNTER
Justina Dinh calling in he has had chest congestion no other symptoms since feb 22nd. he completed the atb and steroid provided, with no improvement. He is taking otc coricidin. Do you have any advisement?  He goes to [place]

## 2020-05-11 ENCOUNTER — OFFICE VISIT (OUTPATIENT)
Dept: ENT CLINIC | Age: 60
End: 2020-05-11
Payer: MEDICARE

## 2020-05-11 VITALS — WEIGHT: 206 LBS | HEIGHT: 74 IN | BODY MASS INDEX: 26.44 KG/M2

## 2020-05-11 PROCEDURE — 69210 REMOVE IMPACTED EAR WAX UNI: CPT | Performed by: NURSE PRACTITIONER

## 2020-05-11 RX ORDER — AZELASTINE 1 MG/ML
1 SPRAY, METERED NASAL 2 TIMES DAILY
Qty: 2 BOTTLE | Refills: 3 | Status: SHIPPED | OUTPATIENT
Start: 2020-05-11

## 2020-05-11 RX ORDER — FLUTICASONE PROPIONATE 50 MCG
1 SPRAY, SUSPENSION (ML) NASAL DAILY
Qty: 3 BOTTLE | Refills: 3 | Status: SHIPPED
Start: 2020-05-11 | End: 2022-03-09

## 2020-05-11 NOTE — PROGRESS NOTES
Wt 206 lb (93.4 kg)   BMI 26.81 kg/m²   Physical Exam  HENT:      Right Ear: Ear canal and external ear normal.      Left Ear: Ear canal and external ear normal. There is impacted cerumen. IMPRESSION/PLAN:  Cerumen removal     Auditory canal(s) both ears completely obstructed with cerumen. Cerumen was gently removed using soft plastic curette. Tympanic membranes are intact following the procedure. Auditory canals appear normal.      Lucero Nolan was seen today for other. Diagnoses and all orders for this visit:    Bilateral impacted cerumen  -     LA REMOVAL IMPACTED CERUMEN INSTRUMENTATION UNILAT    Chronic seasonal allergic rhinitis due to pollen    Other orders  -     fluticasone (FLONASE) 50 MCG/ACT nasal spray; 1 spray by Nasal route daily  -     azelastine (ASTELIN) 0.1 % nasal spray; 1 spray by Nasal route 2 times daily 1 Spray in each nostril      Medications refilled per request.  Given instructions for debrox x 3 days prior to next appointment. Follow up in 6 months for routine cerumen cleaning.     Twila Hilario, MSN, FNP-C  [facility], Nose and Throat

## 2020-06-02 RX ORDER — AMLODIPINE BESYLATE 5 MG/1
TABLET ORAL
Qty: 90 TABLET | Refills: 1 | Status: SHIPPED
Start: 2020-06-02 | End: 2020-12-18

## 2020-06-29 ENCOUNTER — HOSPITAL ENCOUNTER (OUTPATIENT)
Age: 60
Discharge: HOME | End: 2020-07-01
Payer: MEDICARE

## 2020-06-29 ENCOUNTER — OFFICE VISIT (OUTPATIENT)
Dept: FAMILY MEDICINE CLINIC | Age: 60
End: 2020-06-29
Payer: MEDICARE

## 2020-06-29 VITALS
OXYGEN SATURATION: 96 % | TEMPERATURE: 97.6 F | DIASTOLIC BLOOD PRESSURE: 84 MMHG | WEIGHT: 212 LBS | HEIGHT: 74 IN | BODY MASS INDEX: 27.21 KG/M2 | HEART RATE: 74 BPM | SYSTOLIC BLOOD PRESSURE: 136 MMHG

## 2020-06-29 LAB
ALBUMIN SERPL-MCNC: 4.3 G/DL (ref 3.5–5.2)
ALP BLD-CCNC: 104 U/L (ref 40–129)
ALT SERPL-CCNC: 11 U/L (ref 0–40)
ANION GAP SERPL CALCULATED.3IONS-SCNC: 13 MMOL/L (ref 7–16)
AST SERPL-CCNC: 23 U/L (ref 0–39)
BASOPHILS ABSOLUTE: 0.05 E9/L (ref 0–0.2)
BASOPHILS RELATIVE PERCENT: 0.6 % (ref 0–2)
BILIRUB SERPL-MCNC: 0.3 MG/DL (ref 0–1.2)
BUN BLDV-MCNC: 17 MG/DL (ref 6–20)
CALCIUM SERPL-MCNC: 9.3 MG/DL (ref 8.6–10.2)
CHLORIDE BLD-SCNC: 106 MMOL/L (ref 98–107)
CHOLESTEROL, TOTAL: 163 MG/DL (ref 0–199)
CO2: 22 MMOL/L (ref 22–29)
CREAT SERPL-MCNC: 1 MG/DL (ref 0.7–1.2)
EOSINOPHILS ABSOLUTE: 0.16 E9/L (ref 0.05–0.5)
EOSINOPHILS RELATIVE PERCENT: 1.8 % (ref 0–6)
GFR AFRICAN AMERICAN: >60
GFR NON-AFRICAN AMERICAN: >60 ML/MIN/1.73
GLUCOSE BLD-MCNC: 99 MG/DL (ref 74–99)
HCT VFR BLD CALC: 47.2 % (ref 37–54)
HDLC SERPL-MCNC: 67 MG/DL
HEMOGLOBIN: 15.8 G/DL (ref 12.5–16.5)
IMMATURE GRANULOCYTES #: 0.05 E9/L
IMMATURE GRANULOCYTES %: 0.6 % (ref 0–5)
LDL CHOLESTEROL CALCULATED: 77 MG/DL (ref 0–99)
LYMPHOCYTES ABSOLUTE: 1.28 E9/L (ref 1.5–4)
LYMPHOCYTES RELATIVE PERCENT: 14.8 % (ref 20–42)
MCH RBC QN AUTO: 28.6 PG (ref 26–35)
MCHC RBC AUTO-ENTMCNC: 33.5 % (ref 32–34.5)
MCV RBC AUTO: 85.5 FL (ref 80–99.9)
MONOCYTES ABSOLUTE: 0.54 E9/L (ref 0.1–0.95)
MONOCYTES RELATIVE PERCENT: 6.2 % (ref 2–12)
NEUTROPHILS ABSOLUTE: 6.57 E9/L (ref 1.8–7.3)
NEUTROPHILS RELATIVE PERCENT: 76 % (ref 43–80)
PDW BLD-RTO: 14 FL (ref 11.5–15)
PLATELET # BLD: 176 E9/L (ref 130–450)
PMV BLD AUTO: 11.5 FL (ref 7–12)
POTASSIUM SERPL-SCNC: 4.3 MMOL/L (ref 3.5–5)
PROSTATE SPECIFIC ANTIGEN: 1.56 NG/ML (ref 0–4)
RBC # BLD: 5.52 E12/L (ref 3.8–5.8)
SODIUM BLD-SCNC: 141 MMOL/L (ref 132–146)
TOTAL PROTEIN: 7 G/DL (ref 6.4–8.3)
TRIGL SERPL-MCNC: 93 MG/DL (ref 0–149)
TSH SERPL DL<=0.05 MIU/L-ACNC: 7.22 UIU/ML (ref 0.27–4.2)
VLDLC SERPL CALC-MCNC: 19 MG/DL
WBC # BLD: 8.7 E9/L (ref 4.5–11.5)

## 2020-06-29 PROCEDURE — 99214 OFFICE O/P EST MOD 30 MIN: CPT | Performed by: FAMILY MEDICINE

## 2020-06-29 PROCEDURE — 80061 LIPID PANEL: CPT

## 2020-06-29 PROCEDURE — G8427 DOCREV CUR MEDS BY ELIG CLIN: HCPCS | Performed by: FAMILY MEDICINE

## 2020-06-29 PROCEDURE — G8417 CALC BMI ABV UP PARAM F/U: HCPCS | Performed by: FAMILY MEDICINE

## 2020-06-29 PROCEDURE — G0103 PSA SCREENING: HCPCS

## 2020-06-29 PROCEDURE — 36415 COLL VENOUS BLD VENIPUNCTURE: CPT

## 2020-06-29 PROCEDURE — 80053 COMPREHEN METABOLIC PANEL: CPT

## 2020-06-29 PROCEDURE — 4004F PT TOBACCO SCREEN RCVD TLK: CPT | Performed by: FAMILY MEDICINE

## 2020-06-29 PROCEDURE — 85025 COMPLETE CBC W/AUTO DIFF WBC: CPT

## 2020-06-29 PROCEDURE — 3017F COLORECTAL CA SCREEN DOC REV: CPT | Performed by: FAMILY MEDICINE

## 2020-06-29 PROCEDURE — 84443 ASSAY THYROID STIM HORMONE: CPT

## 2020-06-29 RX ORDER — LEVOTHYROXINE SODIUM 0.12 MG/1
TABLET ORAL
Qty: 90 TABLET | Refills: 0 | Status: SHIPPED
Start: 2020-06-29 | End: 2020-07-24

## 2020-06-29 RX ORDER — TRAZODONE HYDROCHLORIDE 50 MG/1
TABLET ORAL
COMMUNITY
Start: 2020-05-06

## 2020-06-29 RX ORDER — TAMSULOSIN HYDROCHLORIDE 0.4 MG/1
CAPSULE ORAL
Qty: 180 CAPSULE | Refills: 0 | Status: SHIPPED
Start: 2020-06-29 | End: 2020-10-30 | Stop reason: SDUPTHER

## 2020-06-29 NOTE — PROGRESS NOTES
: No      Diabetic: No      Tobacco smoker: Yes      Systolic Blood Pressure: 833 mmHg      Is BP treated: Yes      HDL Cholesterol: 67 mg/dL      Total Cholesterol: 163 mg/dL  Discuss statin at next appointment given ASCVD. Cholesterol numbers quite favorable. Gastroesophageal reflux disease without esophagitis  GERD controlled. Continue Prevacid. Hypothyroidism due to acquired atrophy of thyroid  -     TSH without Reflex; Future  -     levothyroxine (SYNTHROID) 125 MCG tablet; TAKE 1 TABLET BY MOUTH ONCE DAILY  -     TSH; Future  Review of TSH shows it is actually somewhat elevated. Compliance good. Increase levothyroxine. Recheck in about 6 to 8 weeks. Dupuytren contracture  -     Aruna Morales MD, Orthopaedics (hand & upper extremities), [place]    Left foot pain  -     Aruna Zelaya DPM, Podiatry, [facility]    Screening for malignant neoplasm of prostate  -     PSA SCREENING; Future    Benign prostatic hyperplasia with post-void dribbling, increased symptoms recently  -Increase   tamsulosin (FLOMAX) 0.4 MG capsule; TAKE 2 CAPSULE BY MOUTH ONCE DAILY--(FOR ENLARGED PROSTATE)---  Encourage follow-up with urology if the above is not significantly helpful. Follow-up in 6 months for chronic medical conditions, sooner if any of the above or not improving with the current plan. Patient counseled to follow up sooner or seek more acute care if symptoms worsening or not improving according to plan. .     Electronically signed by Josiah Eastman MD on 6/30/2020    This note may have been created using dictation software.  Efforts were made to reduce grammatical or syntax errors, but some may persist.

## 2020-07-24 ENCOUNTER — HOSPITAL ENCOUNTER (OUTPATIENT)
Age: 60
Discharge: HOME | End: 2020-07-26
Payer: MEDICARE

## 2020-07-24 ENCOUNTER — OFFICE VISIT (OUTPATIENT)
Dept: PODIATRY | Age: 60
End: 2020-07-24
Payer: MEDICARE

## 2020-07-24 VITALS — WEIGHT: 212 LBS | BODY MASS INDEX: 27.21 KG/M2 | HEIGHT: 74 IN

## 2020-07-24 LAB — TSH SERPL DL<=0.05 MIU/L-ACNC: 10.23 UIU/ML (ref 0.27–4.2)

## 2020-07-24 PROCEDURE — 99203 OFFICE O/P NEW LOW 30 MIN: CPT | Performed by: PODIATRIST

## 2020-07-24 PROCEDURE — G8417 CALC BMI ABV UP PARAM F/U: HCPCS | Performed by: PODIATRIST

## 2020-07-24 PROCEDURE — 84443 ASSAY THYROID STIM HORMONE: CPT

## 2020-07-24 PROCEDURE — 4004F PT TOBACCO SCREEN RCVD TLK: CPT | Performed by: PODIATRIST

## 2020-07-24 PROCEDURE — G8427 DOCREV CUR MEDS BY ELIG CLIN: HCPCS | Performed by: PODIATRIST

## 2020-07-24 PROCEDURE — 36415 COLL VENOUS BLD VENIPUNCTURE: CPT

## 2020-07-24 PROCEDURE — 11721 DEBRIDE NAIL 6 OR MORE: CPT | Performed by: PODIATRIST

## 2020-07-24 PROCEDURE — 3017F COLORECTAL CA SCREEN DOC REV: CPT | Performed by: PODIATRIST

## 2020-07-24 RX ORDER — LEVOTHYROXINE SODIUM 137 UG/1
TABLET ORAL
Qty: 90 TABLET | Refills: 0 | Status: SHIPPED
Start: 2020-07-24 | End: 2020-10-30 | Stop reason: SDUPTHER

## 2020-07-24 NOTE — LETTER
Jamie Rice MD   [address] [facility] [address] [place] [id number]     Patient: Yenni Huggins  YOB: 1960  Date of Visit: 7/24/2020     Dear Jamie Rice MD    Thank you for referring Yenni Huggins to me for evaluation. Patient seen today likely plantar fibroma bilateral foot. Did dispense U-shaped offloading padding. Patient tolerated well. Patient was started on topical Voltaren 1% gel twice daily. I did perform formal debridement all toenails today. Patient tolerated procedure well. I will follow-up 2 months. Once again, thank you very much for this kind referral and allowing me to participate in the care of this patient. If you have questions, please do not hesitate to call me.     Sincerely,        Tammi Gao, [place] [id number] [facility] [id number]  Phone: [phone]  Fax: [phone]

## 2020-07-24 NOTE — PROGRESS NOTES
New patient in office referred by Dr Matt Curran for painful lumps on feet. Patient states he has previously had these lumps on his hands and within the past 8 months they began to appear on his feet. 20  Dora Madrid : 1960 Sex: male  Age: 63 y.o. Patient was referred by Delmy Santos MD    CC:    Pain on the bottom of both left and right foot  Painful toenails. HPI:   This pleasant 60-year-old male patient referred to me today for pain on the bottom of both feet. Does also have painful bumps on both hands. Scheduled see hand surgeon next week. Does have very painful elongated toenails 1 through 5 right and left both feet. Does have history neck and back surgeries. Does have history of ankle fracture treated surgically approximately 4 years ago. States he has noticed increased bumps with increased pain on the bottom both feet for the past 8 months. Denies recent trauma or injury. States pain is worse when he is in a shoe or a boot. Has been trying to wear sandals which seems to help some. Denies recent radiographs. No additional pedal complaints at this time.     ROS:  Const: Denies constitutional symptoms  Musculo: Denies symptoms other than stated above  Skin: Denies symptoms other than stated above       Current Outpatient Medications:     diclofenac sodium (VOLTAREN) 1 % GEL, Apply 1 g topically 2 times daily Can be generic., Disp: 1 Tube, Rfl: 3    traZODone (DESYREL) 50 MG tablet, TAKE 1 TABLET BY MOUTH ONCE DAILY FOR 90 DAYS, Disp: , Rfl:     tamsulosin (FLOMAX) 0.4 MG capsule, TAKE 2 CAPSULE BY MOUTH ONCE DAILY--(FOR ENLARGED PROSTATE)---, Disp: 180 capsule, Rfl: 0    levothyroxine (SYNTHROID) 125 MCG tablet, TAKE 1 TABLET BY MOUTH ONCE DAILY, Disp: 90 tablet, Rfl: 0    amLODIPine (NORVASC) 5 MG tablet, Take 1 tablet by mouth once daily for blood pressure, Disp: 90 tablet, Rfl: 1    fluticasone (FLONASE) 50 MCG/ACT nasal spray, 1 spray by Nasal route daily, Disp: 3 Bottle, Rfl: 3    azelastine (ASTELIN) 0.1 % nasal spray, 1 spray by Nasal route 2 times daily 1 Spray in each nostril, Disp: 2 Bottle, Rfl: 3    clonazePAM (KLONOPIN) 2 MG tablet, TAKE 1 TABLET BY MOUTH THREE TIMES DAILY FOR 30 DAYS, Disp: , Rfl:     traZODone (DESYREL) 150 MG tablet, 300 mg , Disp: , Rfl:     FLUoxetine (PROZAC) 10 MG capsule, TAKE 1 CAPSULE BY MOUTH ONCE DAILY TAKE WITH 20 MG FLUOXETINE, Disp: , Rfl: 0    FLUoxetine (PROZAC) 20 MG capsule, TAKE 1 CAPSULE BY MOUTH ONCE DAILY, Disp: , Rfl: 0    Multiple Vitamin (MULTI-VITAMIN DAILY) TABS, Take by mouth, Disp: , Rfl:     lansoprazole (PREVACID) 15 MG delayed release capsule, TAKE 1 CAPSULE BY MOUTH ONCE DAILY, Disp: 90 capsule, Rfl: 1    pravastatin (PRAVACHOL) 20 MG tablet, TAKE 1 TABLET BY MOUTH ONCE DAILY, Disp: 90 tablet, Rfl: 1    nicotine (NICODERM CQ) 7 MG/24HR, Place 1 patch onto the skin every 24 hours, Disp: 30 patch, Rfl: 1    B Complex Vitamins (B COMPLEX PO), Take 1 tablet by mouth daily, Disp: , Rfl:   No Known Allergies    Past Medical History:   Diagnosis Date    Anxiety     Arthritis     Depression     GERD (gastroesophageal reflux disease)     Hepatitis C     HLD (hyperlipidemia) 6/19/2019    Hypertension     Thyroid disease            Vitals:    07/24/20 0844   Weight: 212 lb (96.2 kg)   Height: 6' 2\" (1.88 m)       Work History/Social History: Retired [employer] [address] [employer] in [employer]   Orthopedic history: Surgical repair ankle fracture 2016 Dr. Sheldon Appiah    Focused Lower Extremity Physical Exam:    Neurovascular examination:    Dorsalis Pedis palpable bilateral.  Posterior tibialis palpable bilateral.    Capillary Refill Time:  Immediate return  Hair growth:  Symmetrical and bilateral   Skin:  Not atrophic. No hair growth noted bilateral  Edema: Mild edema bilateral feet or ankles.   Neurologic:  Light touch diminished bilateral.      Musculoskeletal/ Orthopedic examination:    Equinis: Absent bilateral  Dorsiflexion, plantarflexion, inversion, eversion bilateral 5 out of 5 muscle strength  Wiggling toes  Negative Homans  Tenderness to palpation bilateral medial band plantar fascia worse on the left. Dermatology examination:    No open skin lesions or abrasions bilateral lower extremity. Manual debridement with standard technique toenails 1 through 5 right and left in thickness and length. Patient tolerated procedure well. Prominent soft tissue mass bilateral medial band plantar fascia likely plantar fibroma measuring approximately 2 cm x 1.5 cm x 1 cm. Firmly attached to distal aspect medial band plantar fascia bilateral more prominent on the left. Assessment and Plan:  Carson Goodrich was seen today for foot pain. Diagnoses and all orders for this visit:    Tinea unguium    Corns and callosities    Hereditary sensory neuropathy    Plantar fascial fibromatosis    Pain in left foot    Pain in right foot    Arthritis of left foot    Other orders  -     diclofenac sodium (VOLTAREN) 1 % GEL; Apply 1 g topically 2 times daily Can be generic. Patient seen new referral likely plantar fibroma bilateral medial band plantar fascia  Did dispense U-shaped offloading padding. Voltaren 1% twice daily bilateral medial band plantar fascia. Did discuss conservative or surgical options. I would consider MRI prior to any surgical consideration. Patient wishing to pursue with conservative treatment options at this time. If surgical excision he would likely be nonweightbearing at least 4 weeks. Manual debridement with standard technique toenails 1 through 5 right and left in thickness and length. Patient tolerated procedure well. I will follow-up 2 months    Return in about 2 months (around 9/24/2020). Seen By:  Tom Tran DPM      Document was created using voice recognition software. Note was reviewed, however may contain grammatical errors.

## 2020-08-07 ENCOUNTER — TELEPHONE (OUTPATIENT)
Dept: ORTHOPEDIC SURGERY | Age: 60
End: 2020-08-07

## 2020-08-07 RX ORDER — PRAVASTATIN SODIUM 20 MG
TABLET ORAL
Qty: 90 TABLET | Refills: 1 | Status: SHIPPED
Start: 2020-08-07 | End: 2020-12-29 | Stop reason: SDUPTHER

## 2020-08-10 ENCOUNTER — OFFICE VISIT (OUTPATIENT)
Dept: ORTHOPEDIC SURGERY | Age: 60
End: 2020-08-10
Payer: MEDICARE

## 2020-08-10 VITALS — RESPIRATION RATE: 20 BRPM | BODY MASS INDEX: 28.23 KG/M2 | HEIGHT: 74 IN | WEIGHT: 220 LBS | TEMPERATURE: 98 F

## 2020-08-10 PROCEDURE — 3017F COLORECTAL CA SCREEN DOC REV: CPT | Performed by: ORTHOPAEDIC SURGERY

## 2020-08-10 PROCEDURE — 4004F PT TOBACCO SCREEN RCVD TLK: CPT | Performed by: ORTHOPAEDIC SURGERY

## 2020-08-10 PROCEDURE — 99203 OFFICE O/P NEW LOW 30 MIN: CPT | Performed by: ORTHOPAEDIC SURGERY

## 2020-08-10 PROCEDURE — G8427 DOCREV CUR MEDS BY ELIG CLIN: HCPCS | Performed by: ORTHOPAEDIC SURGERY

## 2020-08-10 PROCEDURE — G8417 CALC BMI ABV UP PARAM F/U: HCPCS | Performed by: ORTHOPAEDIC SURGERY

## 2020-08-10 RX ORDER — METHYLPREDNISOLONE 4 MG/1
TABLET ORAL
Qty: 1 KIT | Refills: 0 | Status: SHIPPED | OUTPATIENT
Start: 2020-08-10 | End: 2020-08-16

## 2020-08-10 NOTE — PROGRESS NOTES
Department of Orthopedic Surgery  History and Physical      CHIEF COMPLAINT:  Bilateral hand numbness and tingling    HISTORY OF PRESENT ILLNESS:                The patient is a RHD 63 y.o. male who presents with bilateral hand numbness and tingling. Patient was seen back in 2016 with bilateral Dupuytren's disease and probable carpal tunnel syndrome. He did have an EMG and nerve conduction study test.  The impression was able to be seen today. His last EMG nerve conduction study test was in 2017. This demonstrates left median neuropathy of minimal severity. He states he wakes up at night with his hands numb and tingling every night. This also occurs during the day. He is currently on disability. He had a failed cervical neck fusion. He does report radiating pain into his hands. he denies any recent injury. Past Medical History:        Diagnosis Date    Anxiety     Arthritis     Depression     GERD (gastroesophageal reflux disease)     Hepatitis C     HLD (hyperlipidemia) 6/19/2019    Hypertension     Thyroid disease      Past Surgical History:        Procedure Laterality Date    ABDOMEN SURGERY      PYLORIC STENOSIS    ANKLE FRACTURE SURGERY Right [id number]    ANKLE FRACTURE SURGERY Right 01/19/2015    CERVICAL FUSION  3/30/2016    acdf c5-c6    COLONOSCOPY      DENTAL SURGERY      tooth abscess    ENDOSCOPY, COLON, DIAGNOSTIC      EYE SURGERY      FRACTURE SURGERY      HARDWARE REMOVAL Right 7/10/2015    Right    HERNIA REPAIR      OTHER SURGICAL HISTORY  1/18/2016    fluiroscopy guided cervical myelogram with conpated tomography    REPAIR RETINAL TEAR/HOLE Bilateral     UPPER GASTROINTESTINAL ENDOSCOPY       Current Medications:   No current facility-administered medications for this visit. Allergies:  Patient has no known allergies. Social History:   TOBACCO:   reports that he has been smoking cigarettes. He has a 1.50 pack-year smoking history.  He has quit using smokeless tobacco.  ETOH:   reports current alcohol use. DRUGS:   reports no history of drug use. ACTIVITIES OF DAILY LIVING:    OCCUPATION:    Family History:       Problem Relation Age of Onset    High Blood Pressure Father     Heart Disease Father     High Blood Pressure Sister     High Blood Pressure Brother     High Blood Pressure Sister        REVIEW OF SYSTEMS:  CONSTITUTIONAL:  negative  EYES:  negative  HEENT:  negative  RESPIRATORY:  negative  CARDIOVASCULAR:  negative  GASTROINTESTINAL:  negative  GENITOURINARY:  negative  INTEGUMENT/BREAST:  negative  HEMATOLOGIC/LYMPHATIC:  hepatitis  ALLERGIC/IMMUNOLOGIC:  negative  ENDOCRINE:  negative  MUSCULOSKELETAL:  pain  NEUROLOGICAL:  N/T  BEHAVIOR/PSYCH:  negative    PHYSICAL EXAM:    VITALS:  Temp 98 °F (36.7 °C) (Infrared)   Resp 20   Ht 6' 2\" (1.88 m)   Wt 220 lb (99.8 kg)   BMI 28.25 kg/m²   CONSTITUTIONAL:  awake, alert, cooperative, no apparent distress, and appears stated age  EYES:  Lids and lashes normal, pupils equal, round and reactive to light, extra ocular muscles intact, sclera clear, conjunctiva normal  ENT:  Normocephalic, without obvious abnormality, atraumatic, sinuses nontender on palpation, external ears without lesions, oral pharynx with moist mucus membranes, tonsils without erythema or exudates, gums normal and good dentition. NECK:  Supple, symmetrical, trachea midline, no adenopathy, thyroid symmetric, not enlarged and no tenderness, skin normal  NEUROLOGIC:  Awake, alert, oriented to name, place and time. Cranial nerves II-XII are grossly intact. Motor is 5 out of 5 bilaterally. Sensory is intact.  gait is normal.  MUSCULOSKELETAL:    Right upper extremity: Non-tender about the shoulder and elbow with good ROM. - tinels of the cubital tunnel, + tinels of the carpal tunnel, + durkans, - finkelsteins, mildly + CMC grind, - tenderness over the A1 pulleys with no active triggering. Small finger with dupuytrens cord and nodule.  Ring finger with dupuytrens pitting. No contracture. - wartenbergs and cross finger testing, APB strength 5/5 with no atrophy. Median, ulnar, radial n intact to light touch. Brisk capillary refill. Gross motor 5/5. Left upper extremity: Non-tender about the shoulder and elbow with good ROM. + tinels of the cubital tunnel, + tinels of the carpal tunnel, + durkans, - finkelsteins, - CMC grind, - tenderness over the A1 pulleys with no active triggering. Ring finger with 20 degree MCP joint contracture with dupuytrens cord and nodule. dupuytrens cord into the small finger with nodule of the PIP joint. No contracture. Middle finger with 15 degree MCP joint contracture. - wartenbergs and cross finger testing, APB strength 5/5 with no atrophy. Median, ulnar, radial n intact to light touch. Brisk capillary refill. Gross motor 5/5. DATA:    CBC:   Lab Results   Component Value Date    WBC 8.7 06/29/2020    RBC 5.52 06/29/2020    HGB 15.8 06/29/2020    HCT 47.2 06/29/2020    MCV 85.5 06/29/2020    MCH 28.6 06/29/2020    MCHC 33.5 06/29/2020    RDW 14.0 06/29/2020     06/29/2020    MPV 11.5 06/29/2020     PT/INR:    Lab Results   Component Value Date    PROTIME 10.8 05/02/2017    PROTIME 10.9 02/25/2012    INR 1.0 05/02/2017       Radiology Review: X-rays of bilateral hands were obtained today in the office and reviewed with patient. 3 views: AP, lateral, oblique demonstrate no acute fractures or dislocations. Impression: No acute fractures or dislocations    IMPRESSION:  · Bilateral hand Dupuytren's disease with contracture to the left ring finger  · Bilateral carpal tunnel syndrome  · Mild right thumb CMC joint arthritis  · Probable cervical radiculopathy    PLAN:  Discussed findings with patient. Recommended an EMG and nerve conduction study test to further evaluate his ongoing numbness and tingling. This recently has worsened. Bilateral wrist braces provided to the patient.  Also recommended a medrol dose pack. Patient to follow-up after this is been completed. I have seen and evaluated the patient and agree with the above assessment and plan on today's visit. I have performed the key components of the history and physical examination with significant findings of bilateral carpal tunnel syndrome worsening. Mildly symptomatic Dupuytren's disease of palms. 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 Recommended EMG nerve conduction studies to assess severity of carpal tunnel syndrome versus radiculopathy. Medrol Dosepak and nocturnal carpal tunnel splints provided. I concur with the findings and plan as documented.     Ning Magallon MD  8/10/2020

## 2020-08-10 NOTE — PATIENT INSTRUCTIONS
Patient Education        Dupuytren's Contracture: Care Instructions  Your Care Instructions     In Dupuytren's contracture, the fingers become stiff and curl toward the palm. It is caused by thick tissue that grows under the skin in the palm of the hand. Sometimes the condition affects the palm but not the fingers. If the tissue gets thicker and affects one or more fingers, it may limit movement of your fingers and hand. Sometimes the condition can occur in the soles of the feet. The cause of Dupuytren's disease is not known. Dupuytren's disease may get worse slowly. If you have mild Dupuytren's disease, you may be able to keep your fingers moving with regular stretching. Surgery usually helps in severe cases. However, Dupuytren's disease can come back. Follow-up care is a key part of your treatment and safety. Be sure to make and go to all appointments, and call your doctor if you are having problems. It's also a good idea to know your test results and keep a list of the medicines you take. How can you care for yourself at home? · Follow your doctor's advice for physical or occupational therapy and exercises to put your fingers and hand through a range of motion. · Two times a day, massage your hand and gently stretch the fingers back. This can get rid of tightness and help keep your fingers flexible. · Try to avoid curling your hand tightly. For example, use utensils and tools that have larger hand . When should you call for help? Call your doctor now or seek immediate medical care if:  · You have numbness in your fingers. · You have a wound or sore on your finger or palm. · Your hand or fingers get worse. Watch closely for changes in your health, and be sure to contact your doctor if you have any problems. Where can you learn more? Go to https://chpemayelineb.Fluid-1. org and sign in to your [employer] account.  Enter T859 in the [employer] box to learn more about \"Dupuytren's wrists. · Ask your doctor if you can take an over-the-counter pain medicine, such as acetaminophen (Tylenol), ibuprofen (Advil, Motrin), or naproxen (Aleve). Be safe with medicines. Read and follow all instructions on the label. · If your doctor prescribes corticosteroid medicine to help reduce pain and swelling, take it exactly as prescribed. Call your doctor if you think you are having a problem with your medicine. · Put ice or a cold pack on your wrist for 10 to 20 minutes at a time to ease pain. Put a thin cloth between the ice and your skin. · If your doctor or your physical or occupational therapist tells you to wear a wrist splint, wear it as directed to keep your wrist in a neutral position. This also eases pressure on your median nerve. · Ask your doctor whether you should have physical or occupational therapy to learn how to do tasks differently. · Try a yoga class to stretch your muscles and build strength in your hands and wrists. Yoga has been shown to ease carpal tunnel symptoms. To prevent carpal tunnel  · When working at a computer, keep your hands and wrists in line with your forearms. Hold your elbows close to your sides. Take a break every 10 to 15 minutes. · Try these exercises:  ? Warm up: Rotate your wrist up, down, and from side to side. Repeat this 4 times. Stretch your fingers far apart, relax them, then stretch them again. Repeat 4 times. Stretch your thumb by pulling it back gently, holding it, and then releasing it. Repeat 4 times. ? Prayer stretch: Start with your palms together in front of your chest just below your chin. Slowly lower your hands toward your waistline while keeping your hands close to your stomach and your palms together until you feel a mild to moderate stretch under your forearms. Hold for 10 to 20 seconds. Repeat 4 times. ? Wrist flexor stretch: Hold your arm in front of you with your palm up. Bend your wrist, pointing your hand toward the floor.  With your other hand, gently bend your wrist further until you feel a mild to moderate stretch in your forearm. Hold for 10 to 20 seconds. Repeat 4 times. ? Wrist extensor stretch: Repeat the steps for the wrist flexor stretch, but begin with your extended hand palm down. · Squeeze a rubber exercise ball several times a day to keep your hands and fingers strong. · Avoid holding objects (such as a book) in one position for a long time. When possible, use your whole hand to grasp an object. Using just the thumb and index finger can put stress on the wrist.  · Do not smoke. It can make this condition worse by reducing blood flow to the median nerve. If you need help quitting, talk to your doctor about stop-smoking programs and medicines. These can increase your chances of quitting for good. When should you call for help? Watch closely for changes in your health, and be sure to contact your doctor if:  · Your pain or other problems do not get better with home care. · You want more information about physical or occupational therapy. · You have side effects of your corticosteroid medicine, such as:  ? Weight gain. ? Mood changes. ? Trouble sleeping. ? Bruising easily. · You have any other problems with your medicine. Where can you learn more? Go to https://[employer] Software. org and sign in to your [employer] account. Enter R432 in the Myfacepage box to learn more about \"Carpal Tunnel Syndrome: Care Instructions. \"     If you do not have an account, please click on the \"Sign Up Now\" link. Current as of: March 2, 2020               Content Version: 12.5  © 2006-2020 Healthwise, Incorporated. Care instructions adapted under license by [facility] ([facility]). If you have questions about a medical condition or this instruction, always ask your healthcare professional. [place] [address] any warranty or liability for your use of this information.

## 2020-10-30 RX ORDER — LEVOTHYROXINE SODIUM 137 UG/1
TABLET ORAL
Qty: 90 TABLET | Refills: 0 | Status: SHIPPED
Start: 2020-10-30 | End: 2020-12-30 | Stop reason: SDUPTHER

## 2020-10-30 RX ORDER — TAMSULOSIN HYDROCHLORIDE 0.4 MG/1
CAPSULE ORAL
Qty: 180 CAPSULE | Refills: 0 | Status: SHIPPED
Start: 2020-10-30 | End: 2020-12-29 | Stop reason: SDUPTHER

## 2020-11-09 ENCOUNTER — OFFICE VISIT (OUTPATIENT)
Dept: ENT CLINIC | Age: 60
End: 2020-11-09
Payer: MEDICARE

## 2020-11-09 ENCOUNTER — TELEPHONE (OUTPATIENT)
Dept: ENT CLINIC | Age: 60
End: 2020-11-09

## 2020-11-09 VITALS — TEMPERATURE: 97.2 F | BODY MASS INDEX: 28.23 KG/M2 | WEIGHT: 220 LBS | HEIGHT: 74 IN

## 2020-11-09 PROCEDURE — 99214 OFFICE O/P EST MOD 30 MIN: CPT | Performed by: OTOLARYNGOLOGY

## 2020-11-09 PROCEDURE — 69210 REMOVE IMPACTED EAR WAX UNI: CPT | Performed by: OTOLARYNGOLOGY

## 2020-11-09 PROCEDURE — G8417 CALC BMI ABV UP PARAM F/U: HCPCS | Performed by: OTOLARYNGOLOGY

## 2020-11-09 PROCEDURE — G8484 FLU IMMUNIZE NO ADMIN: HCPCS | Performed by: OTOLARYNGOLOGY

## 2020-11-09 PROCEDURE — G8427 DOCREV CUR MEDS BY ELIG CLIN: HCPCS | Performed by: OTOLARYNGOLOGY

## 2020-11-09 PROCEDURE — 3017F COLORECTAL CA SCREEN DOC REV: CPT | Performed by: OTOLARYNGOLOGY

## 2020-11-09 PROCEDURE — 4004F PT TOBACCO SCREEN RCVD TLK: CPT | Performed by: OTOLARYNGOLOGY

## 2020-11-09 RX ORDER — OMEPRAZOLE 40 MG/1
40 CAPSULE, DELAYED RELEASE ORAL DAILY
Qty: 30 CAPSULE | Refills: 3 | Status: SHIPPED
Start: 2020-11-09 | End: 2021-03-19 | Stop reason: SDUPTHER

## 2020-11-09 RX ORDER — TRIAMCINOLONE ACETONIDE 0.25 MG/G
CREAM TOPICAL
Qty: 1 TUBE | Refills: 1 | Status: SHIPPED
Start: 2020-11-09 | End: 2022-03-09

## 2020-11-09 NOTE — TELEPHONE ENCOUNTER
[facility] called in to receive instructions for patients kenalog cream script. Per Dr. Charan Ferro, pt is to use cream topically once on each ear every other day. Pharmacist understood.     Electronically signed by Lakisha Garcia CMA on 11/9/20 at 11:20 AM EST

## 2020-11-09 NOTE — PROGRESS NOTES
[facility]  Dr. Wilver Talley. Nehemiah Abbasi. Ms.Ed        Patient Name:  Hoa Hannah  :  1960     CHIEF C/O:    Chief Complaint   Patient presents with    Follow-up     6 month Cerumen        HISTORY OBTAINED FROM:  patient    HISTORY OF PRESENT ILLNESS:       Cindy Oakley is a 62y.o. year old male, here today for follow up of here for multitude of complaints. First complains patient's chronic issues with ear itching, pruritus around the outside external ears with flaking of skin, without any associated history of recurrent infection requiring topical or oral antibiotics. Does have a history of cirrhotic changes. Currently is not using any medications in the ear itself. Patient also history of hoarseness with globus sensation, currently not on any medications but has taken PPI in the past significant overall improvement. No complaints of shortness of breath or stridor, no weight loss or weight gain, no associated true dysphagia to solids or liquids. Patient with complaint of ear fullness pressure and hearing loss, with a known history of cerumen impactions. Patient states been ongoing progressive now for last 4 to 6 weeks, without any associated ear drainage, no recent fever or chills.       Past Medical History:   Diagnosis Date    Anxiety     Arthritis     Depression     GERD (gastroesophageal reflux disease)     Hepatitis C     HLD (hyperlipidemia) 2019    Hypertension     Thyroid disease      Past Surgical History:   Procedure Laterality Date    ABDOMEN SURGERY      PYLORIC STENOSIS    ANKLE FRACTURE SURGERY Right [id number]    ANKLE FRACTURE SURGERY Right 2015    CERVICAL FUSION  3/30/2016    acdf c5-c6    COLONOSCOPY      DENTAL SURGERY      tooth abscess    ENDOSCOPY, COLON, DIAGNOSTIC      EYE SURGERY      FRACTURE SURGERY      HARDWARE REMOVAL Right 7/10/2015    Right    HERNIA REPAIR      OTHER SURGICAL HISTORY  2016    fluiroscopy guided cervical myelogram with conpated tomography    REPAIR RETINAL TEAR/HOLE Bilateral     UPPER GASTROINTESTINAL ENDOSCOPY         Current Outpatient Medications:     triamcinolone (KENALOG) 0.025 % cream, Apply Topically, Disp: 1 Tube, Rfl: 1    omeprazole (PRILOSEC) 40 MG delayed release capsule, Take 1 capsule by mouth daily, Disp: 30 capsule, Rfl: 3    tamsulosin (FLOMAX) 0.4 MG capsule, TAKE 2 CAPSULE BY MOUTH ONCE DAILY--(FOR ENLARGED PROSTATE)---, Disp: 180 capsule, Rfl: 0    levothyroxine (SYNTHROID) 137 MCG tablet, TAKE 1 TABLET BY MOUTH ONCE DAILY, Disp: 90 tablet, Rfl: 0    pravastatin (PRAVACHOL) 20 MG tablet, TAKE 1 TABLET BY MOUTH ONCE DAILY, Disp: 90 tablet, Rfl: 1    diclofenac sodium (VOLTAREN) 1 % GEL, Apply 1 g topically 2 times daily Can be generic., Disp: 1 Tube, Rfl: 3    traZODone (DESYREL) 50 MG tablet, TAKE 1 TABLET BY MOUTH ONCE DAILY FOR 90 DAYS, Disp: , Rfl:     amLODIPine (NORVASC) 5 MG tablet, Take 1 tablet by mouth once daily for blood pressure, Disp: 90 tablet, Rfl: 1    fluticasone (FLONASE) 50 MCG/ACT nasal spray, 1 spray by Nasal route daily, Disp: 3 Bottle, Rfl: 3    azelastine (ASTELIN) 0.1 % nasal spray, 1 spray by Nasal route 2 times daily 1 Spray in each nostril, Disp: 2 Bottle, Rfl: 3    clonazePAM (KLONOPIN) 2 MG tablet, TAKE 1 TABLET BY MOUTH THREE TIMES DAILY FOR 30 DAYS, Disp: , Rfl:     traZODone (DESYREL) 150 MG tablet, 300 mg , Disp: , Rfl:     FLUoxetine (PROZAC) 10 MG capsule, TAKE 1 CAPSULE BY MOUTH ONCE DAILY TAKE WITH 20 MG FLUOXETINE, Disp: , Rfl: 0    FLUoxetine (PROZAC) 20 MG capsule, TAKE 1 CAPSULE BY MOUTH ONCE DAILY, Disp: , Rfl: 0    Multiple Vitamin (MULTI-VITAMIN DAILY) TABS, Take by mouth, Disp: , Rfl:     lansoprazole (PREVACID) 15 MG delayed release capsule, TAKE 1 CAPSULE BY MOUTH ONCE DAILY, Disp: 90 capsule, Rfl: 1    nicotine (NICODERM CQ) 7 MG/24HR, Place 1 patch onto the skin every 24 hours, Disp: 30 patch, Rfl: 1    B Complex Vitamins (B COMPLEX PO), Take 1 tablet by mouth daily, Disp: , Rfl:   Patient has no known allergies. Social History     Tobacco Use    Smoking status: Current Some Day Smoker     Packs/day: 0.15     Years: 10.00     Pack years: 1.50     Types: Cigarettes    Smokeless tobacco: Former User    Tobacco comment: no smoking 24 hours before surgery   Substance Use Topics    Alcohol use: Yes     Alcohol/week: 0.0 standard drinks     Comment: occasional     Drug use: No     Family History   Problem Relation Age of Onset    High Blood Pressure Father     Heart Disease Father     High Blood Pressure Sister     High Blood Pressure Brother     High Blood Pressure Sister        Review of Systems   Constitutional: Negative for activity change, appetite change, chills and fever. HENT: Positive for hearing loss, sore throat and voice change. Negative for ear discharge, rhinorrhea, sinus pressure and trouble swallowing. Respiratory: Negative for cough and shortness of breath. Cardiovascular: Negative for chest pain and palpitations. Gastrointestinal: Negative for vomiting. Skin: Negative for rash. Allergic/Immunologic: Negative for environmental allergies. Neurological: Negative for dizziness and headaches. Hematological: Does not bruise/bleed easily. All other systems reviewed and are negative. Temp 97.2 °F (36.2 °C)   Ht 6' 2\" (1.88 m)   Wt 220 lb (99.8 kg)   BMI 28.25 kg/m²   Physical Exam  Vitals signs and nursing note reviewed. Constitutional:       Appearance: He is well-developed. HENT:      Head: Normocephalic. Right Ear: Tympanic membrane normal. Tenderness present. No swelling. Left Ear: Tympanic membrane normal. Tenderness present. No swelling. Ears:        Nose: No nasal deformity or laceration. Right Nostril: No epistaxis. Left Nostril: No epistaxis. Right Turbinates: Not enlarged or swollen. Left Turbinates: Not enlarged or swollen.    Eyes:      Pupils: Pupils are equal, round, and reactive to light. Neck:      Musculoskeletal: Normal range of motion. Thyroid: No thyromegaly. Trachea: No tracheal deviation. Cardiovascular:      Rate and Rhythm: Normal rate. Pulmonary:      Effort: Pulmonary effort is normal. No respiratory distress. Musculoskeletal: Normal range of motion. Lymphadenopathy:      Cervical: No cervical adenopathy. Skin:     General: Skin is warm. Findings: No erythema. Neurological:      Mental Status: He is alert. Cranial Nerves: No cranial nerve deficit. Procedure: Cerumen Impaction    Pre-op: Cerumen Impaction    Post Op: Same    Procedure: Cerumen Impaction removal    Surgeon: Rafiq Livingston    Procedure: Under microscopic assistance large cerumen impaction was removed using gentle suction and curette. TM intact B/L, Pt tolerated procedure well    Complications: None    Blood Loss Minimial      IMPRESSION/PLAN:  Seen and examined, first underwent cerumen packs removal under microscopic assistance, without any complications. Next patient also has a history of chronic eczema attic otitis externa, will start on topical Kenalog cream 4 times daily as needed. In addition, patient has a history of reflux disease with known hoarseness has been treated in the past with PPI therapy significantly improved, will restart back on PPI therapy and follow-up in 6 months, risk and benefits of PPI long-term is reviewed today. Patient received these risks and was willing to except him. Dr. Lenore Drake D.O.  Ms. Kerwin Link.  Otolaryngology Facial Plastic Surgery  :  [facility] Otolaryngology/Facial Plastic Surgery Residency  Associate Clinical Professor:  JULIEN Adler  [facility]

## 2020-11-18 ENCOUNTER — TELEPHONE (OUTPATIENT)
Dept: FAMILY MEDICINE CLINIC | Age: 60
End: 2020-11-18

## 2020-11-18 ENCOUNTER — HOSPITAL ENCOUNTER (EMERGENCY)
Age: 60
Discharge: HOME | End: 2020-11-18
Payer: MEDICARE

## 2020-11-18 VITALS
TEMPERATURE: 97.1 F | DIASTOLIC BLOOD PRESSURE: 88 MMHG | HEART RATE: 92 BPM | BODY MASS INDEX: 28.25 KG/M2 | RESPIRATION RATE: 16 BRPM | SYSTOLIC BLOOD PRESSURE: 115 MMHG | OXYGEN SATURATION: 93 % | HEIGHT: 74 IN

## 2020-11-18 PROCEDURE — U0003 INFECTIOUS AGENT DETECTION BY NUCLEIC ACID (DNA OR RNA); SEVERE ACUTE RESPIRATORY SYNDROME CORONAVIRUS 2 (SARS-COV-2) (CORONAVIRUS DISEASE [COVID-19]), AMPLIFIED PROBE TECHNIQUE, MAKING USE OF HIGH THROUGHPUT TECHNOLOGIES AS DESCRIBED BY CMS-2020-01-R: HCPCS

## 2020-11-18 PROCEDURE — 99283 EMERGENCY DEPT VISIT LOW MDM: CPT

## 2020-11-18 ASSESSMENT — PAIN SCALES - GENERAL: PAINLEVEL_OUTOF10: 7

## 2020-11-18 ASSESSMENT — PAIN DESCRIPTION - LOCATION: LOCATION: HEAD

## 2020-11-19 NOTE — ED PROVIDER NOTES
[facility]       Department of Emergency Medicine   ED  Provider Note  Admit Date/RoomTime: 11/18/2020 12:33 PM  ED Room: [address] [place] [address]  Chief Complaint   Epistaxis (x 2 days, says BP was 175/128 @ home, -thinners, not actively bleeding)    History of Present Illness   Source of history provided by:  patient. History/Exam Limitations: none. Katy Arthur is a 62 y.o. old male who has a past medical history of   Past Medical History:   Diagnosis Date    Anxiety     Arthritis     Depression     GERD (gastroesophageal reflux disease)     Hepatitis C     HLD (hyperlipidemia) 6/19/2019    Hypertension     Thyroid disease    ,presents to the emergency department by private vehicle, for nosebleed, which has been intermittent since yesterday. Patient denies trauma to the nose. Patient states he does use 2 different nasal sprays. Patient states prior to coming in his blood pressure was also elevated. Patient states that he also has a nonproductive cough for the past few days. Patient denies Covid exposure. Denies blood thinners. Denies fever/chills, headache, vision change, dizziness, hemoptysis, chest pain, dyspnea, abdominal pain, NVD, numbness/weakness. ROS   Pertinent positives and negatives are stated within HPI, all other systems reviewed and are negative.     Past Surgical History:   Procedure Laterality Date    ABDOMEN SURGERY      PYLORIC STENOSIS    ANKLE FRACTURE SURGERY Right [id number]    ANKLE FRACTURE SURGERY Right 01/19/2015    CERVICAL FUSION  3/30/2016    acdf c5-c6    COLONOSCOPY      DENTAL SURGERY      tooth abscess    ENDOSCOPY, COLON, DIAGNOSTIC      EYE SURGERY      FRACTURE SURGERY      HARDWARE REMOVAL Right 7/10/2015    Right    HERNIA REPAIR      OTHER SURGICAL HISTORY  1/18/2016    fluiroscopy guided cervical myelogram with conpated tomography    REPAIR RETINAL TEAR/HOLE Bilateral     UPPER GASTROINTESTINAL ENDOSCOPY     Social History:  reports that he has been smoking cigarettes. He has a 1.50 pack-year smoking history. He has quit using smokeless tobacco. He reports current alcohol use. He reports that he does not use drugs. Family History: family history includes Heart Disease in his father; High Blood Pressure in his brother, father, sister, and sister. Allergies: Patient has no known allergies. Physical Exam           ED Triage Vitals   BP Temp Temp src Pulse Resp SpO2 Height Weight   11/18/20 1231 11/18/20 1220 -- 11/18/20 1220 11/18/20 1220 11/18/20 1220 11/18/20 1231 --   115/88 97.1 °F (36.2 °C)  92 16 93 % 6' 2\" (1.88 m)       Oxygen Saturation Interpretation: Normal.    Constitutional:  Alert, development consistent with age. Eyes:  PERRLA, EOM intact. Conjunctiva:  normal.  Nose:        External:                   Swelling: None. Deformity: No.            Tenderness:  none. Skin:  no erythema, rash or wounds noted. Intranasal:  without erythema or discharge. Abrasion: no.            Laceration: no.            Septal Hematoma:  absent. Septal Deviation:  absent. Bleeding:             Status/Amount: no active bleeding. Site:  From both Naris(es)- no clots or evidence of recent bleeding. Source: From an unidentified source. Sinuses: no bilateral maxillary sinus tenderness. no bilateral frontal sinus tenderness. Throat: There is no blood noted in posterior pharynx. Neck:  Normal ROM. Supple. Respiratory:  Clear to auscultation and breath sounds equal.    CV: Regular rate and rhythm, normal heart sounds, without pathological murmurs, ectopy, gallops, or rubs. Integument:  No rashes, erythema present, unless noted elsewhere. Lymphatics: No lymphangitis or adenopathy noted. Neurological:  Oriented. Motor functions intact.     Lab / Imaging Results   (All laboratory and radiology results have been personally reviewed by myself)  Labs:  Results for orders placed or performed during the hospital encounter of 11/18/20   Covid-19 Ambulatory   Result Value Ref Range    Source NP swab        Imaging: All Radiology results interpreted by Radiologist unless otherwise noted. No orders to display       ED Course / Medical Decision Making   Medications - No data to display         Consult(s):   none. Procedure(s):   none    MDM:   Patient presenting with nosebleed intermittently since yesterday. Patient also has a nonproductive cough. Patient is in no acute distress, afebrile, nontoxic in appearance. On exam, patient has no evidence of epistaxis. Patient's blood pressure is normal.  Patient will be tested for Covid as a send out. Recommended patient self quarantine until receiving results. Recommended patient follow-up with PCP. Recommended patient return to the ED with new or worsening of symptoms. Counseling: The emergency provider has spoken with the patient and discussed todays results, in addition to providing specific details for the plan of care and counseling regarding the diagnosis and prognosis. Questions are answered at this time and they are agreeable with the plan. Assessment      1. Epistaxis    2. Viral illness      Plan   Discharge to home  Patient condition is stable    New Medications     Discharge Medication List as of 11/18/2020  1:36 PM        Electronically signed by Phuong Hernandez PA-C   DD: 11/18/20  **This report was transcribed using voice recognition software. Every effort was made to ensure accuracy; however, inadvertent computerized transcription errors may be present.   END OF ED PROVIDER NOTE     Phuong Hernandez PA-C  11/18/20 8030

## 2020-11-20 LAB
SARS-COV-2: NOT DETECTED
SOURCE: NORMAL

## 2020-11-22 ASSESSMENT — ENCOUNTER SYMPTOMS
SINUS PRESSURE: 0
VOMITING: 0
TROUBLE SWALLOWING: 0
SORE THROAT: 1
COUGH: 0
SHORTNESS OF BREATH: 0
VOICE CHANGE: 1
RHINORRHEA: 0

## 2020-11-30 ENCOUNTER — TELEPHONE (OUTPATIENT)
Dept: FAMILY MEDICINE CLINIC | Age: 60
End: 2020-11-30

## 2020-12-18 RX ORDER — AMLODIPINE BESYLATE 5 MG/1
TABLET ORAL
Qty: 90 TABLET | Refills: 1 | Status: SHIPPED
Start: 2020-12-18 | End: 2021-07-13

## 2020-12-18 NOTE — TELEPHONE ENCOUNTER
Last Appointment:  6/29/2020  Future Appointments   Date Time Provider Edmond Gonzalesisti   12/29/2020 12:40 PM MD ODALIS [facility]   11/15/2021  9:30 AM Rena Nelson, [address] [place] [address]

## 2020-12-29 ENCOUNTER — OFFICE VISIT (OUTPATIENT)
Dept: FAMILY MEDICINE CLINIC | Age: 60
End: 2020-12-29
Payer: MEDICARE

## 2020-12-29 VITALS
WEIGHT: 219 LBS | HEART RATE: 95 BPM | BODY MASS INDEX: 28.12 KG/M2 | TEMPERATURE: 97.7 F | DIASTOLIC BLOOD PRESSURE: 80 MMHG | SYSTOLIC BLOOD PRESSURE: 126 MMHG | OXYGEN SATURATION: 98 %

## 2020-12-29 DIAGNOSIS — E03.4 HYPOTHYROIDISM DUE TO ACQUIRED ATROPHY OF THYROID: ICD-10-CM

## 2020-12-29 LAB — TSH SERPL DL<=0.05 MIU/L-ACNC: 4.07 UIU/ML (ref 0.27–4.2)

## 2020-12-29 PROCEDURE — G8427 DOCREV CUR MEDS BY ELIG CLIN: HCPCS | Performed by: FAMILY MEDICINE

## 2020-12-29 PROCEDURE — 99214 OFFICE O/P EST MOD 30 MIN: CPT | Performed by: FAMILY MEDICINE

## 2020-12-29 PROCEDURE — G8482 FLU IMMUNIZE ORDER/ADMIN: HCPCS | Performed by: FAMILY MEDICINE

## 2020-12-29 PROCEDURE — 3017F COLORECTAL CA SCREEN DOC REV: CPT | Performed by: FAMILY MEDICINE

## 2020-12-29 PROCEDURE — 90686 IIV4 VACC NO PRSV 0.5 ML IM: CPT | Performed by: FAMILY MEDICINE

## 2020-12-29 PROCEDURE — G0008 ADMIN INFLUENZA VIRUS VAC: HCPCS | Performed by: FAMILY MEDICINE

## 2020-12-29 PROCEDURE — 4004F PT TOBACCO SCREEN RCVD TLK: CPT | Performed by: FAMILY MEDICINE

## 2020-12-29 PROCEDURE — G8417 CALC BMI ABV UP PARAM F/U: HCPCS | Performed by: FAMILY MEDICINE

## 2020-12-29 RX ORDER — PRAVASTATIN SODIUM 20 MG
TABLET ORAL
Qty: 90 TABLET | Refills: 3 | Status: SHIPPED
Start: 2020-12-29 | End: 2022-03-09 | Stop reason: SDUPTHER

## 2020-12-29 RX ORDER — NAPROXEN SODIUM 220 MG/1
TABLET ORAL
COMMUNITY

## 2020-12-29 RX ORDER — TAMSULOSIN HYDROCHLORIDE 0.4 MG/1
CAPSULE ORAL
Qty: 180 CAPSULE | Refills: 3 | Status: SHIPPED
Start: 2020-12-29 | End: 2022-03-09 | Stop reason: SDUPTHER

## 2020-12-29 NOTE — PROGRESS NOTES
[facility]  Office Progress Note - Dr. Drena Brittle  12/29/20    Chief Complaint   Patient presents with    6 Month Follow-Up    Back Pain     fell down steps, step gave out    Neck Pain    Arm Injury      HPI: recently had a fall down 4 stairs after the board gave out under him. Timmy back and neck  Sore for 5 days  Dec cerv rom . No focal spine pain but hx of cervical fusion x2.   bruising on right lower lumbar and sacral regions. No new tingling or weakness. LUE slightly weaker than RUE on  exam. Chronic. IN Ed recently with epistaxis and high BP at home. Also with viral like illness at that time and had covid send out which was negative. No recurrent bleeding and Sx resolved of viral illness. Increased his levothyroxine dose in July due to elevating TSH 7 to 10. Other labs normal in June. About only change noted is he is often sweating. Pillow will be wet, t-shirt around neck. Probably 4 months or so, which would correspond. Not daily but a lot. Feels well generally. Wt Readings from Last 3 Encounters:   12/29/20 219 lb (99.3 kg)   11/09/20 220 lb (99.8 kg)   08/10/20 220 lb (99.8 kg)         We inc his flomax at last appt due to inc BPH symptoms. He did make change and has had improvement. Minimal dribbling. No nocturia. No hesitancy. No Se noted. Lipids good, he continues a statin (has been on long term - my misunderstanding from last progress note. Continues without issues. The 10-year ASCVD risk score (Hillary Carr., et al., 2013) is: 8.4%    Values used to calculate the score:      Age: 62 years      Sex: Male      Is Non- : No      Diabetic: No      Tobacco smoker: Yes      Systolic Blood Pressure: 258 mmHg      Is BP treated: Yes      HDL Cholesterol: 67 mg/dL      Total Cholesterol: 163 mg/dL  Cholesterol numbers quite favorable. Hypertension  Follow-up  Blood pressure is controlled today. BP Readings from Last 3 Encounters:   12/29/20 126/80   11/18/20 115/88   06/29/20 136/84     Patient continues medications regularly. Compliance is good. Denies CP, sob, abd pain, headaches, vision changes, dizziness, hypotensive symptoms. No side effects from medications noted. Discussed colon cancer screening as he is due.     ______________________________________________________  Family History   Problem Relation Age of Onset    High Blood Pressure Father     Heart Disease Father     High Blood Pressure Sister     High Blood Pressure Brother     High Blood Pressure Sister        Past Surgical History:   Procedure Laterality Date    ABDOMEN SURGERY      PYLORIC STENOSIS    ANKLE FRACTURE SURGERY Right [id number]    ANKLE FRACTURE SURGERY Right 01/19/2015    CERVICAL FUSION  3/30/2016    acdf c5-c6    COLONOSCOPY      DENTAL SURGERY      tooth abscess    ENDOSCOPY, COLON, DIAGNOSTIC      EYE SURGERY      FRACTURE SURGERY      HARDWARE REMOVAL Right 7/10/2015    Right    HERNIA REPAIR      OTHER SURGICAL HISTORY  1/18/2016    fluiroscopy guided cervical myelogram with conpated tomography    REPAIR RETINAL TEAR/HOLE Bilateral     UPPER GASTROINTESTINAL ENDOSCOPY         Social History     Tobacco Use    Smoking status: Current Some Day Smoker     Packs/day: 0.15     Years: 10.00     Pack years: 1.50     Types: Cigarettes    Smokeless tobacco: Former User    Tobacco comment: no smoking 24 hours before surgery   Substance Use Topics    Alcohol use:  Yes     Alcohol/week: 0.0 standard drinks     Comment: occasional     Drug use: No     ______________________________________________________  ROS: POSITIVE: as in hpi  Otherwise:  No CP, No palpitations,   No sob, No cough,   No abd pain, No heartburn,   No headaches, No vision changes, No hearing changes,   No tingling, No numbness, No weakness,   No bowel changes, No hematochezia, No melena,  No bladder changes, No hematuria No skin rashes, No skin lesions. No polyuria, polydipsia, polyphagia. Stable mood. ROS otherwise negative unless as listed in HPI. Chart reviewed and updated where appropriate for PMH, Fam, and Soc Hx.  _______________________________________________________  Physical Exam   /80   Pulse 95   Temp 97.7 °F (36.5 °C)   Wt 219 lb (99.3 kg)   SpO2 98%   BMI 28.12 kg/m²   Wt Readings from Last 3 Encounters:   12/29/20 219 lb (99.3 kg)   11/09/20 220 lb (99.8 kg)   08/10/20 220 lb (99.8 kg)       Constitutional:    He is oriented to person, place, and time. He appears well-developed and well-nourished. Eyes:    Conjunctivae are normal.    Pupils are equal, round, and reactive to light. EOMI. Neck:    Dec range of motion. No thyromegaly or nodules noted. No bruit. , ttp over traps BL worse on left. Cardiovascular:    Normal rate, regular rhythm and normal heart sounds. No murmur. No gallop and no friction rub. Pulmonary/Chest:    Effort normal and breath sounds normal.    No wheezes. No rales or rhonchi. Abdominal:    Soft. Bowel sounds are normal.    No distension. No tenderness. Musculoskeletal:    Normal range of motion. No joint swelling noted. No peripheral edema. Neurological:    He is A&Ox3. Motor and sensation grossly intact. Antalgic Gait. Skin:    Skin is warm and dry. No rashes, lesions. Bruising on the back. Psychiatric:    He has a normal mood and affect. Normal groom and dress.  No SI or HI.   ________________________________________________________  Current Outpatient Medications on File Prior to Visit   Medication Sig Dispense Refill    Cholecalciferol (VITAMIN D3) 125 MCG (5000 UT) TABS Take by mouth      Omega-3 Fatty Acids (EQL OMEGA 3 FISH OIL) 1000 MG CAPS Take by mouth      amLODIPine (NORVASC) 5 MG tablet Take 1 tablet by mouth once daily for blood pressure 90 tablet 1 Fabi Anderson was seen today for 6 month follow-up, back pain, neck pain and arm injury. Diagnoses and all orders for this visit:    Hypothyroidism due to acquired atrophy of thyroid  -     TSH without Reflex; Future  -     levothyroxine (SYNTHROID) 137 MCG tablet; TAKE 1 TABLET BY MOUTH ONCE DAILY  TSH normalized now on higher dose. Continue. Essential hypertension  BP well controlled, stable continue same. Benign prostatic hyperplasia with post-void dribbling  -     tamsulosin (FLOMAX) 0.4 MG capsule; TAKE 2 CAPSULE BY MOUTH ONCE DAILY--(FOR ENLARGED PROSTATE)---  BPH Sx resolved essentially with higher dose of flomax. PSA ok. Continue same. Fall, initial encounter  -     XR CERVICAL SPINE (4-5 VIEWS); Future  Suspect contusions. Has been functioning for 5 days without care. Neck tight muscular. Xrays reveal no fracture and previous surgical changes    Colon cancer screening  Counseled. He elects to wait until covid is calming down. Due for Cscope. Hyperlipidemia, unspecified hyperlipidemia type  -     pravastatin (PRAVACHOL) 20 MG tablet; TAKE 1 TABLET BY MOUTH ONCE DAILY  Lipids good, continue same. Other orders  -     INFLUENZA, QUADV, 3 YRS AND OLDER, IM PF, PREFILL SYR OR SDV, 0.5ML (AFLURIA QUADV, PF)    Return in about 6 months (around 6/29/2021). Patient counseled to follow up sooner or seek more acute care if symptoms worsening or not improving according to plan. .     Electronically signed by Pretty Almanza MD on 12/30/2020    This note may have been created using dictation software.  Efforts were made to reduce grammatical or syntax errors, but some may persist.

## 2020-12-30 RX ORDER — LEVOTHYROXINE SODIUM 137 UG/1
TABLET ORAL
Qty: 90 TABLET | Refills: 3 | Status: SHIPPED
Start: 2020-12-30 | End: 2022-02-28

## 2021-02-03 ENCOUNTER — TELEPHONE (OUTPATIENT)
Dept: FAMILY MEDICINE CLINIC | Age: 61
End: 2021-02-03

## 2021-02-03 NOTE — TELEPHONE ENCOUNTER
Pt called asking for a handicap placard to be mailed to him. I pended the order with the directions from the last letter you had written.

## 2021-03-19 DIAGNOSIS — K21.9 LPRD (LARYNGOPHARYNGEAL REFLUX DISEASE): Primary | ICD-10-CM

## 2021-03-19 RX ORDER — OMEPRAZOLE 40 MG/1
40 CAPSULE, DELAYED RELEASE ORAL DAILY
Qty: 30 CAPSULE | Refills: 3 | Status: SHIPPED
Start: 2021-03-19 | End: 2021-08-30

## 2021-03-19 NOTE — TELEPHONE ENCOUNTER
Patient called in requesting a refill on omeprazole. Patient was last seen 11/9/20 and his next appt is 11/15/21.     Electronically signed by Jamil Salcido MA on 3/19/21 at 1:13 PM EDT

## 2021-03-22 NOTE — TELEPHONE ENCOUNTER
Pt called asking if we could mail him another handicap placard. He states he has misplaced the one that he received.

## 2021-07-12 NOTE — TELEPHONE ENCOUNTER
Last Appointment:  12/29/2020  Future Appointments   Date Time Provider Edmond Stevens   11/15/2021  9:30 AM Margi Bedoya, [address] [facility] ENT [facility]

## 2021-07-13 RX ORDER — AMLODIPINE BESYLATE 5 MG/1
TABLET ORAL
Qty: 90 TABLET | Refills: 1 | Status: SHIPPED
Start: 2021-07-13 | End: 2022-03-09 | Stop reason: SDUPTHER

## 2021-10-02 DIAGNOSIS — K21.9 LPRD (LARYNGOPHARYNGEAL REFLUX DISEASE): ICD-10-CM

## 2021-10-04 RX ORDER — OMEPRAZOLE 40 MG/1
CAPSULE, DELAYED RELEASE ORAL
Qty: 30 CAPSULE | Refills: 0 | Status: SHIPPED
Start: 2021-10-04 | End: 2021-11-12

## 2021-10-04 NOTE — TELEPHONE ENCOUNTER
Patient was last seen in office 11/9/2020 patient is scheduled to come back in to the office 11/15/2021. Patient would like a prescription refill for Omeprazole.

## 2021-11-11 DIAGNOSIS — K21.9 LPRD (LARYNGOPHARYNGEAL REFLUX DISEASE): Primary | ICD-10-CM

## 2021-11-11 NOTE — TELEPHONE ENCOUNTER
Patient was last seen in office 11/9/2020 patient would like a prescription refill for Omeprazole. Patient  Is scheduled to come into the office 11/15/2021.

## 2021-11-12 RX ORDER — OMEPRAZOLE 40 MG/1
CAPSULE, DELAYED RELEASE ORAL
Qty: 30 CAPSULE | Refills: 0 | Status: SHIPPED
Start: 2021-11-12 | End: 2022-03-09

## 2021-11-15 ENCOUNTER — TELEPHONE (OUTPATIENT)
Dept: ADMINISTRATIVE | Age: 61
End: 2021-11-15

## 2021-11-15 NOTE — TELEPHONE ENCOUNTER
Pt called in to r/s his appt that was scheduled for today. (he went to the [facility] office/said he was unaware that Dr no longer went there)  He is r/s for the next avail appt, but said that he need a refill on the heartburn med that Dr Reggie Tate prescribes for him.

## 2021-11-17 RX ORDER — OMEPRAZOLE 40 MG/1
40 CAPSULE, DELAYED RELEASE ORAL
Qty: 90 CAPSULE | Refills: 1 | Status: SHIPPED | OUTPATIENT
Start: 2021-11-17 | End: 2022-09-12 | Stop reason: SDUPTHER

## 2022-01-31 ENCOUNTER — OFFICE VISIT (OUTPATIENT)
Dept: ENT CLINIC | Age: 62
End: 2022-01-31
Payer: MEDICARE

## 2022-01-31 VITALS
HEART RATE: 93 BPM | BODY MASS INDEX: 28.11 KG/M2 | DIASTOLIC BLOOD PRESSURE: 82 MMHG | HEIGHT: 74 IN | WEIGHT: 219 LBS | SYSTOLIC BLOOD PRESSURE: 134 MMHG

## 2022-01-31 DIAGNOSIS — J31.0 RHINITIS, CHRONIC: ICD-10-CM

## 2022-01-31 DIAGNOSIS — H61.23 BILATERAL HEARING LOSS DUE TO CERUMEN IMPACTION: Primary | ICD-10-CM

## 2022-01-31 PROCEDURE — 4004F PT TOBACCO SCREEN RCVD TLK: CPT | Performed by: OTOLARYNGOLOGY

## 2022-01-31 PROCEDURE — G8419 CALC BMI OUT NRM PARAM NOF/U: HCPCS | Performed by: OTOLARYNGOLOGY

## 2022-01-31 PROCEDURE — 3017F COLORECTAL CA SCREEN DOC REV: CPT | Performed by: OTOLARYNGOLOGY

## 2022-01-31 PROCEDURE — 99213 OFFICE O/P EST LOW 20 MIN: CPT | Performed by: OTOLARYNGOLOGY

## 2022-01-31 PROCEDURE — G8484 FLU IMMUNIZE NO ADMIN: HCPCS | Performed by: OTOLARYNGOLOGY

## 2022-01-31 PROCEDURE — G8427 DOCREV CUR MEDS BY ELIG CLIN: HCPCS | Performed by: OTOLARYNGOLOGY

## 2022-01-31 RX ORDER — AZELASTINE 1 MG/ML
2 SPRAY, METERED NASAL 2 TIMES DAILY
Qty: 30 ML | Refills: 1 | Status: SHIPPED
Start: 2022-01-31 | End: 2022-03-09

## 2022-02-03 ASSESSMENT — ENCOUNTER SYMPTOMS
VOMITING: 0
SHORTNESS OF BREATH: 0
COUGH: 0

## 2022-02-27 DIAGNOSIS — E03.4 HYPOTHYROIDISM DUE TO ACQUIRED ATROPHY OF THYROID: ICD-10-CM

## 2022-02-28 RX ORDER — LEVOTHYROXINE SODIUM 137 UG/1
TABLET ORAL
Qty: 90 TABLET | Refills: 1 | Status: SHIPPED
Start: 2022-02-28 | End: 2022-08-16

## 2022-02-28 NOTE — TELEPHONE ENCOUNTER
Last Appointment:  12/29/2020  Future Appointments   Date Time Provider Edmond Stevens   1/30/2023  8:00 [facility]

## 2022-03-09 ENCOUNTER — OFFICE VISIT (OUTPATIENT)
Dept: FAMILY MEDICINE CLINIC | Age: 62
End: 2022-03-09
Payer: MEDICARE

## 2022-03-09 VITALS
SYSTOLIC BLOOD PRESSURE: 138 MMHG | TEMPERATURE: 98.2 F | OXYGEN SATURATION: 95 % | HEART RATE: 91 BPM | WEIGHT: 211 LBS | HEIGHT: 74 IN | DIASTOLIC BLOOD PRESSURE: 88 MMHG | BODY MASS INDEX: 27.08 KG/M2

## 2022-03-09 DIAGNOSIS — Z12.5 SCREENING FOR MALIGNANT NEOPLASM OF PROSTATE: ICD-10-CM

## 2022-03-09 DIAGNOSIS — I10 ESSENTIAL HYPERTENSION: ICD-10-CM

## 2022-03-09 DIAGNOSIS — E03.4 HYPOTHYROIDISM DUE TO ACQUIRED ATROPHY OF THYROID: ICD-10-CM

## 2022-03-09 DIAGNOSIS — N39.43 BENIGN PROSTATIC HYPERPLASIA WITH POST-VOID DRIBBLING: ICD-10-CM

## 2022-03-09 DIAGNOSIS — E78.5 HYPERLIPIDEMIA, UNSPECIFIED HYPERLIPIDEMIA TYPE: ICD-10-CM

## 2022-03-09 DIAGNOSIS — N40.1 BENIGN PROSTATIC HYPERPLASIA WITH POST-VOID DRIBBLING: ICD-10-CM

## 2022-03-09 DIAGNOSIS — I10 ESSENTIAL HYPERTENSION: Primary | ICD-10-CM

## 2022-03-09 PROBLEM — F10.20 ALCOHOL DEPENDENCE (HCC): Status: RESOLVED | Noted: 2019-06-19 | Resolved: 2022-03-09

## 2022-03-09 LAB
ALBUMIN SERPL-MCNC: 4.5 G/DL (ref 3.5–5.2)
ALP BLD-CCNC: 96 U/L (ref 40–129)
ALT SERPL-CCNC: 17 U/L (ref 0–40)
ANION GAP SERPL CALCULATED.3IONS-SCNC: 18 MMOL/L (ref 7–16)
AST SERPL-CCNC: 29 U/L (ref 0–39)
BASOPHILS ABSOLUTE: 0.03 E9/L (ref 0–0.2)
BASOPHILS RELATIVE PERCENT: 0.5 % (ref 0–2)
BILIRUB SERPL-MCNC: 0.7 MG/DL (ref 0–1.2)
BUN BLDV-MCNC: 18 MG/DL (ref 6–23)
CALCIUM SERPL-MCNC: 9.7 MG/DL (ref 8.6–10.2)
CHLORIDE BLD-SCNC: 104 MMOL/L (ref 98–107)
CHOLESTEROL, TOTAL: 184 MG/DL (ref 0–199)
CO2: 20 MMOL/L (ref 22–29)
CREAT SERPL-MCNC: 1 MG/DL (ref 0.7–1.2)
EOSINOPHILS ABSOLUTE: 0.04 E9/L (ref 0.05–0.5)
EOSINOPHILS RELATIVE PERCENT: 0.7 % (ref 0–6)
GFR AFRICAN AMERICAN: >60
GFR NON-AFRICAN AMERICAN: >60 ML/MIN/1.73
GLUCOSE BLD-MCNC: 109 MG/DL (ref 74–99)
HCT VFR BLD CALC: 45.7 % (ref 37–54)
HDLC SERPL-MCNC: 70 MG/DL
HEMOGLOBIN: 15.4 G/DL (ref 12.5–16.5)
IMMATURE GRANULOCYTES #: 0.01 E9/L
IMMATURE GRANULOCYTES %: 0.2 % (ref 0–5)
LDL CHOLESTEROL CALCULATED: 90 MG/DL (ref 0–99)
LYMPHOCYTES ABSOLUTE: 0.94 E9/L (ref 1.5–4)
LYMPHOCYTES RELATIVE PERCENT: 15.9 % (ref 20–42)
MCH RBC QN AUTO: 29.3 PG (ref 26–35)
MCHC RBC AUTO-ENTMCNC: 33.7 % (ref 32–34.5)
MCV RBC AUTO: 87 FL (ref 80–99.9)
MONOCYTES ABSOLUTE: 0.51 E9/L (ref 0.1–0.95)
MONOCYTES RELATIVE PERCENT: 8.6 % (ref 2–12)
NEUTROPHILS ABSOLUTE: 4.39 E9/L (ref 1.8–7.3)
NEUTROPHILS RELATIVE PERCENT: 74.1 % (ref 43–80)
PDW BLD-RTO: 14.1 FL (ref 11.5–15)
PLATELET # BLD: 182 E9/L (ref 130–450)
PMV BLD AUTO: 11.8 FL (ref 7–12)
POTASSIUM SERPL-SCNC: 4.5 MMOL/L (ref 3.5–5)
PROSTATE SPECIFIC ANTIGEN: 2.95 NG/ML (ref 0–4)
RBC # BLD: 5.25 E12/L (ref 3.8–5.8)
SODIUM BLD-SCNC: 142 MMOL/L (ref 132–146)
TOTAL PROTEIN: 7.7 G/DL (ref 6.4–8.3)
TRIGL SERPL-MCNC: 122 MG/DL (ref 0–149)
TSH SERPL DL<=0.05 MIU/L-ACNC: 3.02 UIU/ML (ref 0.27–4.2)
VLDLC SERPL CALC-MCNC: 24 MG/DL
WBC # BLD: 5.9 E9/L (ref 4.5–11.5)

## 2022-03-09 PROCEDURE — 4004F PT TOBACCO SCREEN RCVD TLK: CPT | Performed by: FAMILY MEDICINE

## 2022-03-09 PROCEDURE — G8419 CALC BMI OUT NRM PARAM NOF/U: HCPCS | Performed by: FAMILY MEDICINE

## 2022-03-09 PROCEDURE — 3017F COLORECTAL CA SCREEN DOC REV: CPT | Performed by: FAMILY MEDICINE

## 2022-03-09 PROCEDURE — G8482 FLU IMMUNIZE ORDER/ADMIN: HCPCS | Performed by: FAMILY MEDICINE

## 2022-03-09 PROCEDURE — G8427 DOCREV CUR MEDS BY ELIG CLIN: HCPCS | Performed by: FAMILY MEDICINE

## 2022-03-09 PROCEDURE — 99214 OFFICE O/P EST MOD 30 MIN: CPT | Performed by: FAMILY MEDICINE

## 2022-03-09 RX ORDER — GABAPENTIN 100 MG/1
CAPSULE ORAL
COMMUNITY
Start: 2022-02-14

## 2022-03-09 RX ORDER — LANSOPRAZOLE 15 MG/1
CAPSULE, DELAYED RELEASE ORAL
Qty: 90 CAPSULE | Refills: 1 | Status: SHIPPED | OUTPATIENT
Start: 2022-03-09

## 2022-03-09 RX ORDER — AMLODIPINE BESYLATE 5 MG/1
TABLET ORAL
Qty: 90 TABLET | Refills: 1 | Status: SHIPPED
Start: 2022-03-09 | End: 2022-09-12 | Stop reason: SDUPTHER

## 2022-03-09 RX ORDER — TAMSULOSIN HYDROCHLORIDE 0.4 MG/1
CAPSULE ORAL
Qty: 180 CAPSULE | Refills: 3 | Status: SHIPPED | OUTPATIENT
Start: 2022-03-09

## 2022-03-09 RX ORDER — PRAVASTATIN SODIUM 20 MG
TABLET ORAL
Qty: 90 TABLET | Refills: 3 | Status: SHIPPED | OUTPATIENT
Start: 2022-03-09

## 2022-06-24 ENCOUNTER — TELEPHONE (OUTPATIENT)
Dept: FAMILY MEDICINE CLINIC | Age: 62
End: 2022-06-24

## 2022-06-24 NOTE — TELEPHONE ENCOUNTER
Faxed Auqu[employer] PT order to [facility]. # [phone]  Fx# [phone]  [facility] will reach out to pt to schedule. Voicemail left for patient to return our call at their earliest convenience to be advised.

## 2022-08-13 DIAGNOSIS — E03.4 HYPOTHYROIDISM DUE TO ACQUIRED ATROPHY OF THYROID: ICD-10-CM

## 2022-08-15 DIAGNOSIS — E03.4 HYPOTHYROIDISM DUE TO ACQUIRED ATROPHY OF THYROID: ICD-10-CM

## 2022-08-15 NOTE — TELEPHONE ENCOUNTER
Last Appointment:  3/9/2022  Future Appointments   Date Time Provider Edmond Hilda   9/12/2022  9:40 AM [facility]   1/30/2023  8:00 AM DO Shiva Kim [facility]

## 2022-08-16 RX ORDER — LEVOTHYROXINE SODIUM 137 UG/1
TABLET ORAL
Qty: 90 TABLET | Refills: 1 | OUTPATIENT
Start: 2022-08-16

## 2022-08-16 RX ORDER — LEVOTHYROXINE SODIUM 137 UG/1
TABLET ORAL
Qty: 90 TABLET | Refills: 1 | Status: SHIPPED | OUTPATIENT
Start: 2022-08-16

## 2022-09-12 ENCOUNTER — OFFICE VISIT (OUTPATIENT)
Dept: FAMILY MEDICINE CLINIC | Age: 62
End: 2022-09-12
Payer: MEDICARE

## 2022-09-12 VITALS
SYSTOLIC BLOOD PRESSURE: 130 MMHG | HEIGHT: 74 IN | HEART RATE: 84 BPM | DIASTOLIC BLOOD PRESSURE: 88 MMHG | OXYGEN SATURATION: 92 % | TEMPERATURE: 98.1 F | WEIGHT: 213 LBS | BODY MASS INDEX: 27.34 KG/M2

## 2022-09-12 DIAGNOSIS — I10 ESSENTIAL HYPERTENSION: ICD-10-CM

## 2022-09-12 DIAGNOSIS — Z00.00 INITIAL MEDICARE ANNUAL WELLNESS VISIT: Primary | ICD-10-CM

## 2022-09-12 PROCEDURE — G0438 PPPS, INITIAL VISIT: HCPCS | Performed by: FAMILY MEDICINE

## 2022-09-12 PROCEDURE — 3017F COLORECTAL CA SCREEN DOC REV: CPT | Performed by: FAMILY MEDICINE

## 2022-09-12 RX ORDER — AMLODIPINE BESYLATE 5 MG/1
TABLET ORAL
Qty: 90 TABLET | Refills: 1 | Status: SHIPPED | OUTPATIENT
Start: 2022-09-12

## 2022-09-12 RX ORDER — OMEPRAZOLE 40 MG/1
40 CAPSULE, DELAYED RELEASE ORAL
Qty: 90 CAPSULE | Refills: 1 | Status: SHIPPED | OUTPATIENT
Start: 2022-09-12

## 2022-09-12 RX ORDER — LANSOPRAZOLE 15 MG/1
CAPSULE, DELAYED RELEASE ORAL
Qty: 90 CAPSULE | Refills: 1 | Status: CANCELLED | OUTPATIENT
Start: 2022-09-12

## 2022-09-12 ASSESSMENT — PATIENT HEALTH QUESTIONNAIRE - PHQ9
SUM OF ALL RESPONSES TO PHQ QUESTIONS 1-9: 0
2. FEELING DOWN, DEPRESSED OR HOPELESS: 0
SUM OF ALL RESPONSES TO PHQ QUESTIONS 1-9: 0
SUM OF ALL RESPONSES TO PHQ9 QUESTIONS 1 & 2: 0
1. LITTLE INTEREST OR PLEASURE IN DOING THINGS: 0
SUM OF ALL RESPONSES TO PHQ QUESTIONS 1-9: 0
SUM OF ALL RESPONSES TO PHQ QUESTIONS 1-9: 0

## 2022-09-12 ASSESSMENT — LIFESTYLE VARIABLES
HOW MANY STANDARD DRINKS CONTAINING ALCOHOL DO YOU HAVE ON A TYPICAL DAY: 1 OR 2
HOW OFTEN DO YOU HAVE A DRINK CONTAINING ALCOHOL: MONTHLY OR LESS

## 2022-09-12 NOTE — PATIENT INSTRUCTIONS
Personalized Preventive Plan for Lester Ahuja - 9/12/2022  Medicare offers a range of preventive health benefits. Some of the tests and screenings are paid in full while other may be subject to a deductible, co-insurance, and/or copay. Some of these benefits include a comprehensive review of your medical history including lifestyle, illnesses that may run in your family, and various assessments and screenings as appropriate. After reviewing your medical record and screening and assessments performed today your provider may have ordered immunizations, labs, imaging, and/or referrals for you. A list of these orders (if applicable) as well as your Preventive Care list are included within your After Visit Summary for your review. Other Preventive Recommendations:    A preventive eye exam performed by an eye specialist is recommended every 1-2 years to screen for glaucoma; cataracts, macular degeneration, and other eye disorders. A preventive dental visit is recommended every 6 months. Try to get at least 150 minutes of exercise per week or 10,000 steps per day on a pedometer . Order or download the FREE \"Exercise & Physical Activity: Your Everyday Guide\" from [employer]. Call 8-464.949.8609 or search [employer] online. You need 4251-3566 mg of calcium and 7930-5927 IU of vitamin D per day. It is possible to meet your calcium requirement with diet alone, but a vitamin D supplement is usually necessary to meet this goal.  When exposed to the sun, use a sunscreen that protects against both UVA and UVB radiation with an SPF of 30 or greater. Reapply every 2 to 3 hours or after sweating, drying off with a towel, or swimming. Always wear a seat belt when traveling in a car. Always wear a helmet when riding a bicycle or motorcycle.

## 2022-09-12 NOTE — PROGRESS NOTES
Medicare Annual Wellness Visit    Francisco Delong is here for Medicare AWV and Fall (Around 08/22/22 - fell on right elbow and back)    Assessment & Plan   Initial Medicare annual wellness visit  Overall Francisco Delong is doing well. Age appropriate HM topics reviewed and updated where agreeable. Vaccines reviewed and updated where agreeable. Age appropriate anticipatory guidance discussed. Encouraged to work on [employer] and exercise strategies. Opportunity to ask questions and answers provided as able. Recommend RTO in 1 year for annual physical.     Essential hypertension, stable but borderline  -     amLODIPine (NORVASC) 5 MG tablet; Take 1 tablet by mouth once daily for blood pressure, Disp-90 tablet, R-1Normal  -     CBC with Auto Differential; Future  -     Comprehensive Metabolic Panel; Future  -     Lipid Panel; Future    Recommendations for Preventive Services Due: see orders and patient instructions/AVS.  Recommended screening schedule for the next 5-10 years is provided to the patient in written form: see Patient Instructions/AVS.     Return in 6 months (on 3/12/2023) for Medicare Annual Wellness Visit in 1 year. Subjective       Patient's complete Health Risk Assessment and screening values have been reviewed and are found in Flowsheets. The following problems were reviewed today and where indicated follow up appointments were made and/or referrals ordered.     Positive Risk Factor Screenings with Interventions:    Fall Risk:  Do you feel unsteady or are you worried about falling? : no  2 or more falls in past year?: (!) yes  Fall with injury in past year?: (!) yes   Fall Risk Interventions:    Home safety tips provided          Tobacco Use:  Tobacco Use: High Risk    Smoking Tobacco Use: Some Days    Smokeless Tobacco Use: Former     E-cigarette/Vaping       Questions Responses    E-cigarette/Vaping Use Never User    Start Date     Passive Exposure     Quit Date     Counseling another person or the use of an assistance device (cane, crutch, prosthetic device, wheelchair, etc.). He has limited walking ability due to an arthritic and an orthopedic condition.   Duration: 5 years Yes Monica Honeycutt MD   Cholecalciferol (VITAMIN D3) 125 MCG (5000 UT) TABS Take by mouth Yes Historical Provider, MD   Omega-3 Fatty Acids (EQL OMEGA 3 FISH OIL) 1000 MG CAPS Take by mouth Yes Historical Provider, MD   traZODone (DESYREL) 50 MG tablet TAKE 1 TABLET BY MOUTH ONCE DAILY FOR 90 DAYS Yes Historical Provider, MD   azelastine (ASTELIN) 0.1 % nasal spray 1 spray by Nasal route 2 times daily 1 Spray in each nostril Yes CÉSAR Finch - CNP   clonazePAM (KLONOPIN) 2 MG tablet TAKE 1 TABLET BY MOUTH THREE TIMES DAILY FOR 30 DAYS Yes Historical Provider, MD   traZODone (DESYREL) 150 MG tablet 300 mg  Yes Historical Provider, MD   FLUoxetine (PROZAC) 10 MG capsule TAKE 1 CAPSULE BY MOUTH ONCE DAILY TAKE WITH 20 MG FLUOXETINE Yes Historical Provider, MD   FLUoxetine (PROZAC) 20 MG capsule TAKE 1 CAPSULE BY MOUTH ONCE DAILY Yes Historical Provider, MD   Multiple Vitamin (MULTI-VITAMIN DAILY) TABS Take by mouth Yes Historical Provider, MD   B Complex Vitamins (B COMPLEX PO) Take 1 tablet by mouth daily Yes Historical Provider, MD Huber (Including outside providers/suppliers regularly involved in providing care):   Patient Care Team:  Monica Honeycutt MD as PCP - General (Family Medicine)  Monica Honeycutt MD as PCP - [facility] Empaneled Provider     Reviewed and updated this visit:  Tobacco  Allergies  Meds  Med Hx  Surg Hx  Soc Hx  Fam Hx

## 2023-03-22 NOTE — TELEPHONE ENCOUNTER
Last Appointment:  9/12/2022  Future Appointments   Date Time Provider Edmond Stevens   9/14/2023  9:00 AM Marti Horowitz  [address] [place] [address]

## 2023-03-23 RX ORDER — OMEPRAZOLE 40 MG/1
CAPSULE, DELAYED RELEASE ORAL
Qty: 90 CAPSULE | Refills: 3 | Status: SHIPPED | OUTPATIENT
Start: 2023-03-23

## 2023-03-30 DIAGNOSIS — N40.1 BENIGN PROSTATIC HYPERPLASIA WITH POST-VOID DRIBBLING: ICD-10-CM

## 2023-03-30 DIAGNOSIS — N39.43 BENIGN PROSTATIC HYPERPLASIA WITH POST-VOID DRIBBLING: ICD-10-CM

## 2023-03-30 RX ORDER — TAMSULOSIN HYDROCHLORIDE 0.4 MG/1
CAPSULE ORAL
Qty: 180 CAPSULE | Refills: 1 | Status: SHIPPED | OUTPATIENT
Start: 2023-03-30

## 2023-03-30 NOTE — TELEPHONE ENCOUNTER
Last Appointment:  9/12/2022    Future appts:  Future Appointments   Date Time Provider Edmond Stevens   9/14/2023  9:00 AM Leoncio Montoya MD [facility]        [] No

## 2023-04-03 DIAGNOSIS — E03.4 HYPOTHYROIDISM DUE TO ACQUIRED ATROPHY OF THYROID: ICD-10-CM

## 2023-04-03 RX ORDER — LEVOTHYROXINE SODIUM 137 UG/1
TABLET ORAL
Qty: 90 TABLET | Refills: 1 | Status: SHIPPED | OUTPATIENT
Start: 2023-04-03

## 2023-04-03 NOTE — TELEPHONE ENCOUNTER
Last Appointment:  9/12/2022  Future Appointments   Date Time Provider Edmond Stevens   9/14/2023  9:00 AM Eric Larios MD [facility]

## 2023-06-25 ENCOUNTER — HOSPITAL ENCOUNTER (EMERGENCY)
Age: 63
Discharge: LEFT BEFORE DISCHARGE | End: 2023-06-25
Attending: EMERGENCY MEDICINE
Payer: MEDICARE

## 2023-06-25 ENCOUNTER — APPOINTMENT (OUTPATIENT)
Dept: GENERAL RADIOLOGY | Age: 63
End: 2023-06-25
Payer: MEDICARE

## 2023-06-25 VITALS
TEMPERATURE: 97.8 F | HEART RATE: 117 BPM | SYSTOLIC BLOOD PRESSURE: 125 MMHG | BODY MASS INDEX: 27.73 KG/M2 | DIASTOLIC BLOOD PRESSURE: 83 MMHG | OXYGEN SATURATION: 90 % | RESPIRATION RATE: 18 BRPM | WEIGHT: 216 LBS

## 2023-06-25 DIAGNOSIS — R07.9 CHEST PAIN, UNSPECIFIED TYPE: ICD-10-CM

## 2023-06-25 DIAGNOSIS — Z53.29 LEFT AGAINST MEDICAL ADVICE: Primary | ICD-10-CM

## 2023-06-25 DIAGNOSIS — R06.02 SHORTNESS OF BREATH: ICD-10-CM

## 2023-06-25 LAB
ALBUMIN SERPL-MCNC: 4.6 G/DL (ref 3.5–5.2)
ALP SERPL-CCNC: 96 U/L (ref 40–129)
ALT SERPL-CCNC: 13 U/L (ref 0–40)
ANION GAP SERPL CALCULATED.3IONS-SCNC: 14 MMOL/L (ref 7–16)
AST SERPL-CCNC: 22 U/L (ref 0–39)
BASOPHILS # BLD: 0.07 E9/L (ref 0–0.2)
BASOPHILS NFR BLD: 0.9 % (ref 0–2)
BILIRUB SERPL-MCNC: 0.4 MG/DL (ref 0–1.2)
BNP BLD-MCNC: 86 PG/ML (ref 0–125)
BUN SERPL-MCNC: 10 MG/DL (ref 6–23)
CALCIUM SERPL-MCNC: 9.2 MG/DL (ref 8.6–10.2)
CHLORIDE SERPL-SCNC: 99 MMOL/L (ref 98–107)
CO2 SERPL-SCNC: 23 MMOL/L (ref 22–29)
CREAT SERPL-MCNC: 0.8 MG/DL (ref 0.7–1.2)
D DIMER: <200 NG/ML DDU
EOSINOPHIL # BLD: 0.11 E9/L (ref 0.05–0.5)
EOSINOPHIL NFR BLD: 1.4 % (ref 0–6)
ERYTHROCYTE [DISTWIDTH] IN BLOOD BY AUTOMATED COUNT: 13.2 FL (ref 11.5–15)
ETHANOLAMINE SERPL-MCNC: 257 MG/DL (ref 0–0.08)
GLUCOSE SERPL-MCNC: 87 MG/DL (ref 74–99)
HCT VFR BLD AUTO: 41.9 % (ref 37–54)
HGB BLD-MCNC: 14.6 G/DL (ref 12.5–16.5)
IMM GRANULOCYTES # BLD: 0.03 E9/L
IMM GRANULOCYTES NFR BLD: 0.4 % (ref 0–5)
LACTATE BLDV-SCNC: 2.1 MMOL/L (ref 0.5–2.2)
LIPASE: 30 U/L (ref 13–60)
LYMPHOCYTES # BLD: 1.96 E9/L (ref 1.5–4)
LYMPHOCYTES NFR BLD: 25.1 % (ref 20–42)
MCH RBC QN AUTO: 29.4 PG (ref 26–35)
MCHC RBC AUTO-ENTMCNC: 34.8 % (ref 32–34.5)
MCV RBC AUTO: 84.3 FL (ref 80–99.9)
MONOCYTES # BLD: 0.51 E9/L (ref 0.1–0.95)
MONOCYTES NFR BLD: 6.5 % (ref 2–12)
NEUTROPHILS # BLD: 5.12 E9/L (ref 1.8–7.3)
NEUTS SEG NFR BLD: 65.7 % (ref 43–80)
PLATELET # BLD AUTO: 176 E9/L (ref 130–450)
PMV BLD AUTO: 11.3 FL (ref 7–12)
POTASSIUM SERPL-SCNC: 3.6 MMOL/L (ref 3.5–5)
PROT SERPL-MCNC: 7.5 G/DL (ref 6.4–8.3)
RBC # BLD AUTO: 4.97 E12/L (ref 3.8–5.8)
SODIUM SERPL-SCNC: 136 MMOL/L (ref 132–146)
TROPONIN, HIGH SENSITIVITY: <6 NG/L (ref 0–11)
WBC # BLD: 7.8 E9/L (ref 4.5–11.5)

## 2023-06-25 PROCEDURE — 83880 ASSAY OF NATRIURETIC PEPTIDE: CPT

## 2023-06-25 PROCEDURE — 99285 EMERGENCY DEPT VISIT HI MDM: CPT

## 2023-06-25 PROCEDURE — 93005 ELECTROCARDIOGRAM TRACING: CPT | Performed by: EMERGENCY MEDICINE

## 2023-06-25 PROCEDURE — 85378 FIBRIN DEGRADE SEMIQUANT: CPT

## 2023-06-25 PROCEDURE — 83605 ASSAY OF LACTIC ACID: CPT

## 2023-06-25 PROCEDURE — 83690 ASSAY OF LIPASE: CPT

## 2023-06-25 PROCEDURE — 82077 ASSAY SPEC XCP UR&BREATH IA: CPT

## 2023-06-25 PROCEDURE — 80053 COMPREHEN METABOLIC PANEL: CPT

## 2023-06-25 PROCEDURE — 71045 X-RAY EXAM CHEST 1 VIEW: CPT

## 2023-06-25 PROCEDURE — 84484 ASSAY OF TROPONIN QUANT: CPT

## 2023-06-25 PROCEDURE — 85025 COMPLETE CBC W/AUTO DIFF WBC: CPT

## 2023-06-25 ASSESSMENT — PAIN SCALES - GENERAL: PAINLEVEL_OUTOF10: 9

## 2023-06-25 ASSESSMENT — PAIN - FUNCTIONAL ASSESSMENT: PAIN_FUNCTIONAL_ASSESSMENT: 0-10

## 2023-06-26 LAB
EKG ATRIAL RATE: 98 BPM
EKG P AXIS: 40 DEGREES
EKG P-R INTERVAL: 138 MS
EKG Q-T INTERVAL: 386 MS
EKG QRS DURATION: 86 MS
EKG QTC CALCULATION (BAZETT): 492 MS
EKG R AXIS: -18 DEGREES
EKG T AXIS: 10 DEGREES
EKG VENTRICULAR RATE: 98 BPM

## 2023-06-26 PROCEDURE — 93010 ELECTROCARDIOGRAM REPORT: CPT | Performed by: INTERNAL MEDICINE

## 2023-07-03 DIAGNOSIS — E78.5 HYPERLIPIDEMIA, UNSPECIFIED HYPERLIPIDEMIA TYPE: ICD-10-CM

## 2023-07-03 DIAGNOSIS — I10 ESSENTIAL HYPERTENSION: ICD-10-CM

## 2023-07-03 RX ORDER — PRAVASTATIN SODIUM 20 MG
TABLET ORAL
Qty: 90 TABLET | Refills: 1 | Status: SHIPPED | OUTPATIENT
Start: 2023-07-03

## 2023-07-03 RX ORDER — AMLODIPINE BESYLATE 5 MG/1
TABLET ORAL
Qty: 90 TABLET | Refills: 1 | Status: SHIPPED | OUTPATIENT
Start: 2023-07-03

## 2023-07-03 NOTE — TELEPHONE ENCOUNTER
Last Appointment:  9/12/2022  Future Appointments   Date Time Provider [address]   9/15/2023  9:00 AM Segun Frey  [facility] [employer]

## 2023-09-15 ENCOUNTER — OFFICE VISIT (OUTPATIENT)
Dept: FAMILY MEDICINE CLINIC | Age: 63
End: 2023-09-15
Payer: MEDICARE

## 2023-09-15 VITALS
SYSTOLIC BLOOD PRESSURE: 132 MMHG | BODY MASS INDEX: 27.98 KG/M2 | HEIGHT: 74 IN | DIASTOLIC BLOOD PRESSURE: 78 MMHG | WEIGHT: 218 LBS | TEMPERATURE: 97.8 F | OXYGEN SATURATION: 96 % | RESPIRATION RATE: 16 BRPM | HEART RATE: 71 BPM

## 2023-09-15 DIAGNOSIS — E78.5 HYPERLIPIDEMIA, UNSPECIFIED HYPERLIPIDEMIA TYPE: ICD-10-CM

## 2023-09-15 DIAGNOSIS — N40.1 BENIGN PROSTATIC HYPERPLASIA WITH POST-VOID DRIBBLING: ICD-10-CM

## 2023-09-15 DIAGNOSIS — Z12.11 COLON CANCER SCREENING: ICD-10-CM

## 2023-09-15 DIAGNOSIS — E03.4 HYPOTHYROIDISM DUE TO ACQUIRED ATROPHY OF THYROID: ICD-10-CM

## 2023-09-15 DIAGNOSIS — Z12.5 SCREENING FOR MALIGNANT NEOPLASM OF PROSTATE: ICD-10-CM

## 2023-09-15 DIAGNOSIS — Z00.00 MEDICARE ANNUAL WELLNESS VISIT, SUBSEQUENT: Primary | ICD-10-CM

## 2023-09-15 DIAGNOSIS — N39.43 BENIGN PROSTATIC HYPERPLASIA WITH POST-VOID DRIBBLING: ICD-10-CM

## 2023-09-15 DIAGNOSIS — N48.89 PENILE CURVATURE, ACQUIRED: ICD-10-CM

## 2023-09-15 LAB
CHOLESTEROL: 185 MG/DL
HDLC SERPL-MCNC: 62 MG/DL
LDL CHOLESTEROL: 96 MG/DL
PROSTATE SPECIFIC ANTIGEN: 1.87 NG/ML (ref 0–4)
TRIGL SERPL-MCNC: 137 MG/DL
TSH SERPL DL<=0.05 MIU/L-ACNC: 5.11 UIU/ML (ref 0.27–4.2)
VLDLC SERPL CALC-MCNC: 27 MG/DL

## 2023-09-15 PROCEDURE — 90674 CCIIV4 VAC NO PRSV 0.5 ML IM: CPT | Performed by: FAMILY MEDICINE

## 2023-09-15 PROCEDURE — 3017F COLORECTAL CA SCREEN DOC REV: CPT | Performed by: FAMILY MEDICINE

## 2023-09-15 PROCEDURE — 3078F DIAST BP <80 MM HG: CPT | Performed by: FAMILY MEDICINE

## 2023-09-15 PROCEDURE — G0439 PPPS, SUBSEQ VISIT: HCPCS | Performed by: FAMILY MEDICINE

## 2023-09-15 PROCEDURE — 3075F SYST BP GE 130 - 139MM HG: CPT | Performed by: FAMILY MEDICINE

## 2023-09-15 PROCEDURE — G0008 ADMIN INFLUENZA VIRUS VAC: HCPCS | Performed by: FAMILY MEDICINE

## 2023-09-15 RX ORDER — GABAPENTIN 100 MG/1
100 CAPSULE ORAL 3 TIMES DAILY
Qty: 90 CAPSULE | Refills: 1 | Status: SHIPPED | OUTPATIENT
Start: 2023-09-15 | End: 2023-12-14

## 2023-09-15 RX ORDER — AZELASTINE 1 MG/ML
1 SPRAY, METERED NASAL 2 TIMES DAILY
Qty: 2 EACH | Refills: 3 | Status: SHIPPED | OUTPATIENT
Start: 2023-09-15

## 2023-09-15 RX ORDER — TAMSULOSIN HYDROCHLORIDE 0.4 MG/1
CAPSULE ORAL
Qty: 180 CAPSULE | Refills: 1 | Status: SHIPPED | OUTPATIENT
Start: 2023-09-15

## 2023-09-15 SDOH — ECONOMIC STABILITY: FOOD INSECURITY: WITHIN THE PAST 12 MONTHS, THE FOOD YOU BOUGHT JUST DIDN'T LAST AND YOU DIDN'T HAVE MONEY TO GET MORE.: NEVER TRUE

## 2023-09-15 SDOH — ECONOMIC STABILITY: HOUSING INSECURITY
IN THE LAST 12 MONTHS, WAS THERE A TIME WHEN YOU DID NOT HAVE A STEADY PLACE TO SLEEP OR SLEPT IN A SHELTER (INCLUDING NOW)?: NO

## 2023-09-15 SDOH — ECONOMIC STABILITY: INCOME INSECURITY: HOW HARD IS IT FOR YOU TO PAY FOR THE VERY BASICS LIKE FOOD, HOUSING, MEDICAL CARE, AND HEATING?: NOT HARD AT ALL

## 2023-09-15 SDOH — ECONOMIC STABILITY: FOOD INSECURITY: WITHIN THE PAST 12 MONTHS, YOU WORRIED THAT YOUR FOOD WOULD RUN OUT BEFORE YOU GOT MONEY TO BUY MORE.: NEVER TRUE

## 2023-09-15 ASSESSMENT — PATIENT HEALTH QUESTIONNAIRE - PHQ9
1. LITTLE INTEREST OR PLEASURE IN DOING THINGS: 0
SUM OF ALL RESPONSES TO PHQ QUESTIONS 1-9: 0
4. FEELING TIRED OR HAVING LITTLE ENERGY: 0
2. FEELING DOWN, DEPRESSED OR HOPELESS: 0
10. IF YOU CHECKED OFF ANY PROBLEMS, HOW DIFFICULT HAVE THESE PROBLEMS MADE IT FOR YOU TO DO YOUR WORK, TAKE CARE OF THINGS AT HOME, OR GET ALONG WITH OTHER PEOPLE: 0
5. POOR APPETITE OR OVEREATING: 0
SUM OF ALL RESPONSES TO PHQ QUESTIONS 1-9: 0
8. MOVING OR SPEAKING SO SLOWLY THAT OTHER PEOPLE COULD HAVE NOTICED. OR THE OPPOSITE, BEING SO FIGETY OR RESTLESS THAT YOU HAVE BEEN MOVING AROUND A LOT MORE THAN USUAL: 0
SUM OF ALL RESPONSES TO PHQ QUESTIONS 1-9: 0
SUM OF ALL RESPONSES TO PHQ9 QUESTIONS 1 & 2: 0
3. TROUBLE FALLING OR STAYING ASLEEP: 0
6. FEELING BAD ABOUT YOURSELF - OR THAT YOU ARE A FAILURE OR HAVE LET YOURSELF OR YOUR FAMILY DOWN: 0
SUM OF ALL RESPONSES TO PHQ QUESTIONS 1-9: 0
9. THOUGHTS THAT YOU WOULD BE BETTER OFF DEAD, OR OF HURTING YOURSELF: 0
7. TROUBLE CONCENTRATING ON THINGS, SUCH AS READING THE NEWSPAPER OR WATCHING TELEVISION: 0

## 2023-09-15 NOTE — PROGRESS NOTES
Medicare Annual Wellness Visit    Edilson Guzman is here for Medicare AWV    Assessment & Plan   Medicare annual wellness visit, subsequent  Overall Edilson Guzman is doing well. Age appropriate HM topics reviewed and updated where agreeable. Vaccines reviewed and updated where agreeable. Age appropriate anticipatory guidance discussed. Encouraged to work on [employer] and exercise strategies. Opportunity to ask questions and answers provided as able. Recommend RTO in 1 year for annual physical.       Benign prostatic hyperplasia with post-void dribbling, worsening  -   Continue tamsulosin (FLOMAX) 0.4 MG capsule; TAKE 2 CAPSULES BY MOUTH ONCE DAILY (FOR  ENLARGED  PROSTATE), Disp-180 capsule, R-1Normal  Discuss with urology. Penile curvature, acquired  -     AFL - Marvie Kehr, MD, Urology, [place]     Screening for malignant neoplasm of prostate  -     PSA Screening; Future    Hyperlipidemia, unspecified hyperlipidemia type  -     Lipid Panel; Future  Continue statin. Hypothyroidism due to acquired atrophy of thyroid  -     TSH; Future  Continue levothyroxine and adjust as necessary. Colon cancer screening  -     Cologuard (Fecal DNA Colorectal Cancer Screening)    Recommendations for Preventive Services Due: see orders and patient instructions/AVS.  Recommended screening schedule for the next 5-10 years is provided to the patient in written form: see Patient Instructions/AVS.     Return in 6 months (on 3/15/2024) for Medicare Annual Wellness Visit in 1 year. Subjective     Overall he has been feeling pretty well. He notes curvature of his penis which is new for him. Suspects Peyronie's disease. Also somewhat painful when he gets an erection now. It is an upward curvature. Never had this when he was younger. Has been having increased urinary frequency and some leakage. He increased his Flomax to 3 pills daily and it seemed to help.   Advised that 2 pills is the maximum

## 2023-09-19 ENCOUNTER — TELEPHONE (OUTPATIENT)
Dept: FAMILY MEDICINE CLINIC | Age: 63
End: 2023-09-19

## 2023-09-19 DIAGNOSIS — E03.4 HYPOTHYROIDISM DUE TO ACQUIRED ATROPHY OF THYROID: ICD-10-CM

## 2023-09-19 RX ORDER — LEVOTHYROXINE SODIUM 0.15 MG/1
150 TABLET ORAL DAILY
Qty: 90 TABLET | Refills: 1 | Status: SHIPPED | OUTPATIENT
Start: 2023-09-19

## 2023-09-19 NOTE — TELEPHONE ENCOUNTER
----- Message from Chelsey Damon MD sent at 9/18/2023  5:45 PM EDT -----  Please advise patient labs reviewed and his thyroid looks a little bit undertreated. If he has been missing a dose of thyroid medication here and there I would just encourage him to continue the same dose for now. If he has been pretty much taking it every single day without missing any doses then we could try bumping it up to 150 mcg daily.

## 2023-09-19 NOTE — TELEPHONE ENCOUNTER
Prescription sent to pharmacy. Recommend he have his thyroid function rechecked in about 2 months. Please let me know if any palpitations, feeling hot all the time, unintentional weight loss between now and then.

## 2023-09-19 NOTE — TELEPHONE ENCOUNTER
Patient informed of results. He has not missed any doses of levothyroxine and is agreeable to dose change. New Rx is ready to be sent.

## 2023-11-02 RX ORDER — GABAPENTIN 100 MG/1
100 CAPSULE ORAL 3 TIMES DAILY
Qty: 270 CAPSULE | Refills: 1 | Status: SHIPPED | OUTPATIENT
Start: 2023-11-02 | End: 2024-04-30

## 2023-11-02 NOTE — TELEPHONE ENCOUNTER
Patient called for a refill on Gabapentin. Please send to [facility]. He is taking 100mg tid. Rx is ready to be sent.      Last Appointment:  9/15/2023  Future Appointments   Date Time Provider [address] [place] [address]   3/15/2024  8:00 AM [facility]elin [facility]    ;

## 2024-01-23 DIAGNOSIS — I10 ESSENTIAL HYPERTENSION: ICD-10-CM

## 2024-01-23 DIAGNOSIS — E78.5 HYPERLIPIDEMIA, UNSPECIFIED HYPERLIPIDEMIA TYPE: ICD-10-CM

## 2024-01-23 RX ORDER — PRAVASTATIN SODIUM 20 MG
20 TABLET ORAL DAILY
Qty: 90 TABLET | Refills: 1 | Status: SHIPPED | OUTPATIENT
Start: 2024-01-23

## 2024-01-23 RX ORDER — AMLODIPINE BESYLATE 5 MG/1
5 TABLET ORAL DAILY
Qty: 90 TABLET | Refills: 1 | Status: SHIPPED | OUTPATIENT
Start: 2024-01-23

## 2024-01-23 NOTE — TELEPHONE ENCOUNTER
Last Appointment:  9/15/2023    Future appts:  Future Appointments   Date Time Provider Department Center   3/25/2024  8:40 AM [facility]        Patient calling in, needing 2 prescriptions refilled. He is completely out.

## 2024-03-14 DIAGNOSIS — E03.4 HYPOTHYROIDISM DUE TO ACQUIRED ATROPHY OF THYROID: ICD-10-CM

## 2024-03-15 RX ORDER — LEVOTHYROXINE SODIUM 0.15 MG/1
150 TABLET ORAL DAILY
Qty: 90 TABLET | Refills: 1 | Status: SHIPPED | OUTPATIENT
Start: 2024-03-15

## 2024-03-29 DIAGNOSIS — E78.5 HYPERLIPIDEMIA, UNSPECIFIED HYPERLIPIDEMIA TYPE: ICD-10-CM

## 2024-03-29 DIAGNOSIS — N40.1 BENIGN PROSTATIC HYPERPLASIA WITH POST-VOID DRIBBLING: ICD-10-CM

## 2024-03-29 DIAGNOSIS — N39.43 BENIGN PROSTATIC HYPERPLASIA WITH POST-VOID DRIBBLING: ICD-10-CM

## 2024-03-29 DIAGNOSIS — E03.4 HYPOTHYROIDISM DUE TO ACQUIRED ATROPHY OF THYROID: ICD-10-CM

## 2024-03-29 DIAGNOSIS — I10 ESSENTIAL HYPERTENSION: ICD-10-CM

## 2024-03-29 RX ORDER — PRAVASTATIN SODIUM 20 MG
20 TABLET ORAL DAILY
Qty: 90 TABLET | Refills: 1 | Status: SHIPPED | OUTPATIENT
Start: 2024-03-29

## 2024-03-29 RX ORDER — TAMSULOSIN HYDROCHLORIDE 0.4 MG/1
CAPSULE ORAL
Qty: 180 CAPSULE | Refills: 1 | Status: SHIPPED | OUTPATIENT
Start: 2024-03-29

## 2024-03-29 RX ORDER — LEVOTHYROXINE SODIUM 0.15 MG/1
150 TABLET ORAL DAILY
Qty: 90 TABLET | Refills: 1 | Status: SHIPPED | OUTPATIENT
Start: 2024-03-29

## 2024-03-29 RX ORDER — AMLODIPINE BESYLATE 5 MG/1
5 TABLET ORAL DAILY
Qty: 90 TABLET | Refills: 1 | Status: SHIPPED | OUTPATIENT
Start: 2024-03-29

## 2024-03-29 RX ORDER — OMEPRAZOLE 40 MG/1
CAPSULE, DELAYED RELEASE ORAL
Qty: 90 CAPSULE | Refills: 1 | Status: SHIPPED | OUTPATIENT
Start: 2024-03-29

## 2024-03-29 RX ORDER — GABAPENTIN 100 MG/1
100 CAPSULE ORAL 3 TIMES DAILY
Qty: 270 CAPSULE | Refills: 1 | Status: SHIPPED | OUTPATIENT
Start: 2024-03-29 | End: 2024-09-25

## 2024-04-04 ENCOUNTER — TELEPHONE (OUTPATIENT)
Dept: FAMILY MEDICINE CLINIC | Age: 64
End: 2024-04-04

## 2024-04-04 DIAGNOSIS — N39.43 BENIGN PROSTATIC HYPERPLASIA WITH POST-VOID DRIBBLING: ICD-10-CM

## 2024-04-04 DIAGNOSIS — I10 ESSENTIAL HYPERTENSION: ICD-10-CM

## 2024-04-04 DIAGNOSIS — E78.5 HYPERLIPIDEMIA, UNSPECIFIED HYPERLIPIDEMIA TYPE: ICD-10-CM

## 2024-04-04 DIAGNOSIS — N40.1 BENIGN PROSTATIC HYPERPLASIA WITH POST-VOID DRIBBLING: ICD-10-CM

## 2024-04-04 DIAGNOSIS — E03.4 HYPOTHYROIDISM DUE TO ACQUIRED ATROPHY OF THYROID: ICD-10-CM

## 2024-04-04 DIAGNOSIS — E03.4 HYPOTHYROIDISM DUE TO ACQUIRED ATROPHY OF THYROID: Primary | ICD-10-CM

## 2024-04-04 RX ORDER — OMEPRAZOLE 40 MG/1
CAPSULE, DELAYED RELEASE ORAL
Qty: 90 CAPSULE | Refills: 1 | Status: SHIPPED | OUTPATIENT
Start: 2024-04-04

## 2024-04-04 RX ORDER — PRAVASTATIN SODIUM 20 MG
20 TABLET ORAL DAILY
Qty: 90 TABLET | Refills: 1 | Status: SHIPPED | OUTPATIENT
Start: 2024-04-04

## 2024-04-04 RX ORDER — LEVOTHYROXINE SODIUM 0.15 MG/1
150 TABLET ORAL DAILY
Qty: 90 TABLET | Refills: 1 | Status: SHIPPED | OUTPATIENT
Start: 2024-04-04

## 2024-04-04 RX ORDER — GABAPENTIN 100 MG/1
100 CAPSULE ORAL 3 TIMES DAILY
Qty: 270 CAPSULE | Refills: 1 | Status: SHIPPED | OUTPATIENT
Start: 2024-04-04 | End: 2024-10-01

## 2024-04-04 RX ORDER — AMLODIPINE BESYLATE 5 MG/1
5 TABLET ORAL DAILY
Qty: 90 TABLET | Refills: 1 | Status: SHIPPED | OUTPATIENT
Start: 2024-04-04

## 2024-04-04 RX ORDER — TAMSULOSIN HYDROCHLORIDE 0.4 MG/1
CAPSULE ORAL
Qty: 180 CAPSULE | Refills: 1 | Status: SHIPPED | OUTPATIENT
Start: 2024-04-04

## 2024-04-04 NOTE — TELEPHONE ENCOUNTER
Don calling for new scripts for his meds to be sent to [facility] in [place].    I have these ready to send:       Tamsulosin, Pravastatin, Omeprazole, Levothyroxine, Gabapentin & Amlodepine.       Last Appointment:  9/15/2023  Future Appointments   Date Time Provider Department Center   5/6/2024  3:00 PM Pablo Kumar MD [facility]

## 2024-04-04 NOTE — TELEPHONE ENCOUNTER
Chip has an appointment with you on May 6th and asking if you want him to have labs done prior to that appointment?

## 2024-04-22 DIAGNOSIS — E03.4 HYPOTHYROIDISM DUE TO ACQUIRED ATROPHY OF THYROID: ICD-10-CM

## 2024-04-22 LAB
ALBUMIN: 4.2 G/DL (ref 3.5–5.2)
ALP BLD-CCNC: 91 U/L (ref 40–129)
ALT SERPL-CCNC: 11 U/L (ref 0–40)
ANION GAP SERPL CALCULATED.3IONS-SCNC: 13 MMOL/L (ref 7–16)
AST SERPL-CCNC: 19 U/L (ref 0–39)
BASOPHILS ABSOLUTE: 0.04 K/UL (ref 0–0.2)
BASOPHILS RELATIVE PERCENT: 1 % (ref 0–2)
BILIRUB SERPL-MCNC: 0.3 MG/DL (ref 0–1.2)
BUN BLDV-MCNC: 14 MG/DL (ref 6–23)
CALCIUM SERPL-MCNC: 9.4 MG/DL (ref 8.6–10.2)
CHLORIDE BLD-SCNC: 105 MMOL/L (ref 98–107)
CO2: 24 MMOL/L (ref 22–29)
CREAT SERPL-MCNC: 0.9 MG/DL (ref 0.7–1.2)
EOSINOPHILS ABSOLUTE: 0.14 K/UL (ref 0.05–0.5)
EOSINOPHILS RELATIVE PERCENT: 2 % (ref 0–6)
GFR SERPL CREATININE-BSD FRML MDRD: >90 ML/MIN/1.73M2
GLUCOSE BLD-MCNC: 88 MG/DL (ref 74–99)
HCT VFR BLD CALC: 45.7 % (ref 37–54)
HEMOGLOBIN: 15.2 G/DL (ref 12.5–16.5)
IMMATURE GRANULOCYTES %: 0 % (ref 0–5)
IMMATURE GRANULOCYTES ABSOLUTE: <0.03 K/UL (ref 0–0.58)
LYMPHOCYTES ABSOLUTE: 1.66 K/UL (ref 1.5–4)
LYMPHOCYTES RELATIVE PERCENT: 24 % (ref 20–42)
MCH RBC QN AUTO: 29.1 PG (ref 26–35)
MCHC RBC AUTO-ENTMCNC: 33.3 G/DL (ref 32–34.5)
MCV RBC AUTO: 87.4 FL (ref 80–99.9)
MONOCYTES ABSOLUTE: 0.5 K/UL (ref 0.1–0.95)
MONOCYTES RELATIVE PERCENT: 7 % (ref 2–12)
NEUTROPHILS ABSOLUTE: 4.67 K/UL (ref 1.8–7.3)
NEUTROPHILS RELATIVE PERCENT: 66 % (ref 43–80)
PDW BLD-RTO: 14.2 % (ref 11.5–15)
PLATELET # BLD: 197 K/UL (ref 130–450)
PMV BLD AUTO: 12.3 FL (ref 7–12)
POTASSIUM SERPL-SCNC: 3.9 MMOL/L (ref 3.5–5)
RBC # BLD: 5.23 M/UL (ref 3.8–5.8)
SODIUM BLD-SCNC: 142 MMOL/L (ref 132–146)
TOTAL PROTEIN: 7.2 G/DL (ref 6.4–8.3)
TSH SERPL DL<=0.05 MIU/L-ACNC: 3.62 UIU/ML (ref 0.27–4.2)
WBC # BLD: 7 K/UL (ref 4.5–11.5)

## 2024-05-24 ENCOUNTER — OFFICE VISIT (OUTPATIENT)
Dept: FAMILY MEDICINE CLINIC | Age: 64
End: 2024-05-24

## 2024-05-24 VITALS
BODY MASS INDEX: 25.67 KG/M2 | SYSTOLIC BLOOD PRESSURE: 110 MMHG | DIASTOLIC BLOOD PRESSURE: 80 MMHG | WEIGHT: 200 LBS | HEIGHT: 74 IN | HEART RATE: 80 BPM | TEMPERATURE: 98.1 F | OXYGEN SATURATION: 95 %

## 2024-05-24 DIAGNOSIS — F17.210 CIGARETTE NICOTINE DEPENDENCE WITHOUT COMPLICATION: ICD-10-CM

## 2024-05-24 DIAGNOSIS — Z87.891 PERSONAL HISTORY OF TOBACCO USE: ICD-10-CM

## 2024-05-24 DIAGNOSIS — Z12.2 SCREENING FOR LUNG CANCER: ICD-10-CM

## 2024-05-24 DIAGNOSIS — R63.4 UNINTENTIONAL WEIGHT LOSS: ICD-10-CM

## 2024-05-24 DIAGNOSIS — N48.89 PENILE CURVATURE, ACQUIRED: Primary | ICD-10-CM

## 2024-05-24 RX ORDER — AZELASTINE 1 MG/ML
1 SPRAY, METERED NASAL 2 TIMES DAILY
Qty: 30 ML | Refills: 1 | Status: SHIPPED | OUTPATIENT
Start: 2024-05-24

## 2024-05-24 ASSESSMENT — PATIENT HEALTH QUESTIONNAIRE - PHQ9
3. TROUBLE FALLING OR STAYING ASLEEP: NOT AT ALL
10. IF YOU CHECKED OFF ANY PROBLEMS, HOW DIFFICULT HAVE THESE PROBLEMS MADE IT FOR YOU TO DO YOUR WORK, TAKE CARE OF THINGS AT HOME, OR GET ALONG WITH OTHER PEOPLE: NOT DIFFICULT AT ALL
5. POOR APPETITE OR OVEREATING: NOT AT ALL
6. FEELING BAD ABOUT YOURSELF - OR THAT YOU ARE A FAILURE OR HAVE LET YOURSELF OR YOUR FAMILY DOWN: NOT AT ALL
SUM OF ALL RESPONSES TO PHQ QUESTIONS 1-9: 0
SUM OF ALL RESPONSES TO PHQ9 QUESTIONS 1 & 2: 0
SUM OF ALL RESPONSES TO PHQ QUESTIONS 1-9: 0
2. FEELING DOWN, DEPRESSED OR HOPELESS: NOT AT ALL
SUM OF ALL RESPONSES TO PHQ QUESTIONS 1-9: 0
9. THOUGHTS THAT YOU WOULD BE BETTER OFF DEAD, OR OF HURTING YOURSELF: NOT AT ALL
4. FEELING TIRED OR HAVING LITTLE ENERGY: NOT AT ALL
1. LITTLE INTEREST OR PLEASURE IN DOING THINGS: NOT AT ALL
8. MOVING OR SPEAKING SO SLOWLY THAT OTHER PEOPLE COULD HAVE NOTICED. OR THE OPPOSITE, BEING SO FIGETY OR RESTLESS THAT YOU HAVE BEEN MOVING AROUND A LOT MORE THAN USUAL: NOT AT ALL
7. TROUBLE CONCENTRATING ON THINGS, SUCH AS READING THE NEWSPAPER OR WATCHING TELEVISION: NOT AT ALL
SUM OF ALL RESPONSES TO PHQ QUESTIONS 1-9: 0

## 2024-05-24 NOTE — PROGRESS NOTES
light.    EOMI.   Neck:    Normal range of motion.    No thyromegaly or nodules noted.    No bruit. No LAD.  Cardiovascular:    Normal rate, regular rhythm and normal heart sounds.     No murmur. No gallop and no friction rub.   Pulmonary/Chest:    Effort normal and breath sounds normal.    No wheezes. No rales or rhonchi.  Abdominal:    Soft. Bowel sounds are normal.    No distension. No tenderness.   Musculoskeletal:    Normal range of motion.     No joint swelling noted.    No peripheral edema.  Neurological:    He is A&Ox3.   Motor and sensation grossly intact.     Normal Gait.   Skin:    Skin is warm and dry.    No rashes, lesions.  Psychiatric:    He has a normal mood and affect.    Normal groom and dress. No SI or HI.   ________________________________________________________    This note may have been created using dictation software. Efforts were made to reduce errors, but some may persist.   Discussed with the patient the current USPSTF guidelines released March 9, 2021 for screening for lung cancer.    For adults aged 50 to 80 years who have a 20 pack-year smoking history and currently smoke or have quit within the past 15 years the grade B recommendation is to:  Screen for lung cancer with low-dose computed tomography (LDCT) every year.  Stop screening once a person has not smoked for 15 years or has a health problem that limits life expectancy or the ability to have lung surgery.    The patient  reports that he has been smoking cigarettes. He has a 21.5 pack-year smoking history. He has quit using smokeless tobacco.. Discussed with patient the risks and benefits of screening, including over-diagnosis, false positive rate, and total radiation exposure.  The patient currently exhibits no signs or symptoms suggestive of lung cancer.  Discussed with patient the importance of compliance with yearly annual lung cancer screenings and willingness to undergo diagnosis and treatment if screening scan is positive.

## 2024-06-07 ENCOUNTER — HOSPITAL ENCOUNTER (OUTPATIENT)
Dept: CT IMAGING | Age: 64
End: 2024-06-07
Payer: MEDICARE

## 2024-06-07 DIAGNOSIS — Z87.891 PERSONAL HISTORY OF TOBACCO USE: ICD-10-CM

## 2024-06-07 PROCEDURE — 71271 CT THORAX LUNG CANCER SCR C-: CPT

## 2024-09-24 DIAGNOSIS — N40.1 BENIGN PROSTATIC HYPERPLASIA WITH POST-VOID DRIBBLING: ICD-10-CM

## 2024-09-24 DIAGNOSIS — N39.43 BENIGN PROSTATIC HYPERPLASIA WITH POST-VOID DRIBBLING: ICD-10-CM

## 2024-09-26 RX ORDER — TAMSULOSIN HYDROCHLORIDE 0.4 MG/1
CAPSULE ORAL
Qty: 180 CAPSULE | Refills: 1 | Status: SHIPPED | OUTPATIENT
Start: 2024-09-26

## 2024-09-26 RX ORDER — OMEPRAZOLE 40 MG/1
CAPSULE, DELAYED RELEASE ORAL
Qty: 90 CAPSULE | Refills: 1 | Status: SHIPPED | OUTPATIENT
Start: 2024-09-26

## 2024-10-13 DIAGNOSIS — E78.5 HYPERLIPIDEMIA, UNSPECIFIED HYPERLIPIDEMIA TYPE: ICD-10-CM

## 2024-10-14 RX ORDER — PRAVASTATIN SODIUM 20 MG
20 TABLET ORAL DAILY
Qty: 90 TABLET | Refills: 1 | Status: SHIPPED | OUTPATIENT
Start: 2024-10-14

## 2024-10-14 NOTE — TELEPHONE ENCOUNTER
Last Appointment:  5/24/2024  Future Appointments   Date Time Provider Department Center   11/25/2024 10:40 AM Pablo Kumar MD [facility]

## 2024-11-04 ENCOUNTER — TELEPHONE (OUTPATIENT)
Dept: FAMILY MEDICINE CLINIC | Age: 64
End: 2024-11-04

## 2024-11-04 NOTE — TELEPHONE ENCOUNTER
Patient called into office and stated that he has been having right hip problems the last few days. He states today that his pain is radiating from the right hip down to his ankle. He states he is having severe discomfort that he had to crawl to the bathroom and when he was in the bathroom he fell due to his pain. He is wanting to know what you would advise him to do? Please advise

## 2024-11-04 NOTE — TELEPHONE ENCOUNTER
ER or [facility] med residency clinic on Tuesday (@ [facility]).

## 2024-11-05 ENCOUNTER — HOSPITAL ENCOUNTER (EMERGENCY)
Age: 64
Discharge: HOME | End: 2024-11-05
Attending: EMERGENCY MEDICINE
Payer: MEDICARE

## 2024-11-05 ENCOUNTER — APPOINTMENT (OUTPATIENT)
Dept: MRI IMAGING | Age: 64
End: 2024-11-05
Payer: MEDICARE

## 2024-11-05 ENCOUNTER — APPOINTMENT (OUTPATIENT)
Dept: GENERAL RADIOLOGY | Age: 64
End: 2024-11-05
Payer: MEDICARE

## 2024-11-05 ENCOUNTER — APPOINTMENT (OUTPATIENT)
Dept: CT IMAGING | Age: 64
End: 2024-11-05
Payer: MEDICARE

## 2024-11-05 VITALS
SYSTOLIC BLOOD PRESSURE: 132 MMHG | RESPIRATION RATE: 16 BRPM | HEIGHT: 74 IN | HEART RATE: 63 BPM | WEIGHT: 200 LBS | TEMPERATURE: 98.1 F | BODY MASS INDEX: 25.67 KG/M2 | OXYGEN SATURATION: 94 % | DIASTOLIC BLOOD PRESSURE: 93 MMHG

## 2024-11-05 DIAGNOSIS — M51.26 LUMBAR DISC HERNIATION: Primary | ICD-10-CM

## 2024-11-05 LAB
ALBUMIN SERPL-MCNC: 4.2 G/DL (ref 3.5–5.2)
ALP SERPL-CCNC: 117 U/L (ref 40–129)
ALT SERPL-CCNC: 12 U/L (ref 0–40)
ANION GAP SERPL CALCULATED.3IONS-SCNC: 10 MMOL/L (ref 7–16)
AST SERPL-CCNC: 21 U/L (ref 0–39)
BASOPHILS # BLD: 0.06 K/UL (ref 0–0.2)
BASOPHILS NFR BLD: 1 % (ref 0–2)
BILIRUB SERPL-MCNC: 0.5 MG/DL (ref 0–1.2)
BUN SERPL-MCNC: 23 MG/DL (ref 6–23)
CALCIUM SERPL-MCNC: 9.5 MG/DL (ref 8.6–10.2)
CHLORIDE SERPL-SCNC: 105 MMOL/L (ref 98–107)
CO2 SERPL-SCNC: 25 MMOL/L (ref 22–29)
CREAT SERPL-MCNC: 1 MG/DL (ref 0.7–1.2)
EOSINOPHIL # BLD: 0.1 K/UL (ref 0.05–0.5)
EOSINOPHILS RELATIVE PERCENT: 1 % (ref 0–6)
ERYTHROCYTE [DISTWIDTH] IN BLOOD BY AUTOMATED COUNT: 14 % (ref 11.5–15)
GFR, ESTIMATED: >60 ML/MIN/1.73M2
GLUCOSE SERPL-MCNC: 110 MG/DL (ref 74–99)
HCT VFR BLD AUTO: 42.7 % (ref 37–54)
HGB BLD-MCNC: 14.8 G/DL (ref 12.5–16.5)
IMM GRANULOCYTES # BLD AUTO: 0.02 K/UL (ref 0–0.58)
IMM GRANULOCYTES NFR BLD: 0 % (ref 0–5)
LYMPHOCYTES NFR BLD: 1.65 K/UL (ref 1.5–4)
LYMPHOCYTES RELATIVE PERCENT: 23 % (ref 20–42)
MCH RBC QN AUTO: 29.2 PG (ref 26–35)
MCHC RBC AUTO-ENTMCNC: 34.7 G/DL (ref 32–34.5)
MCV RBC AUTO: 84.4 FL (ref 80–99.9)
MONOCYTES NFR BLD: 0.55 K/UL (ref 0.1–0.95)
MONOCYTES NFR BLD: 8 % (ref 2–12)
NEUTROPHILS NFR BLD: 67 % (ref 43–80)
NEUTS SEG NFR BLD: 4.9 K/UL (ref 1.8–7.3)
PLATELET # BLD AUTO: 218 K/UL (ref 130–450)
PMV BLD AUTO: 11.3 FL (ref 7–12)
POTASSIUM SERPL-SCNC: 3.9 MMOL/L (ref 3.5–5)
PROT SERPL-MCNC: 7 G/DL (ref 6.4–8.3)
RBC # BLD AUTO: 5.06 M/UL (ref 3.8–5.8)
SODIUM SERPL-SCNC: 140 MMOL/L (ref 132–146)
WBC OTHER # BLD: 7.3 K/UL (ref 4.5–11.5)

## 2024-11-05 PROCEDURE — 6360000002 HC RX W HCPCS: Performed by: EMERGENCY MEDICINE

## 2024-11-05 PROCEDURE — 6370000000 HC RX 637 (ALT 250 FOR IP): Performed by: EMERGENCY MEDICINE

## 2024-11-05 PROCEDURE — 96374 THER/PROPH/DIAG INJ IV PUSH: CPT

## 2024-11-05 PROCEDURE — 72148 MRI LUMBAR SPINE W/O DYE: CPT

## 2024-11-05 PROCEDURE — 72131 CT LUMBAR SPINE W/O DYE: CPT

## 2024-11-05 PROCEDURE — 99284 EMERGENCY DEPT VISIT MOD MDM: CPT

## 2024-11-05 PROCEDURE — 96376 TX/PRO/DX INJ SAME DRUG ADON: CPT

## 2024-11-05 PROCEDURE — 6360000002 HC RX W HCPCS: Performed by: STUDENT IN AN ORGANIZED HEALTH CARE EDUCATION/TRAINING PROGRAM

## 2024-11-05 PROCEDURE — 6370000000 HC RX 637 (ALT 250 FOR IP): Performed by: STUDENT IN AN ORGANIZED HEALTH CARE EDUCATION/TRAINING PROGRAM

## 2024-11-05 PROCEDURE — 85025 COMPLETE CBC W/AUTO DIFF WBC: CPT

## 2024-11-05 PROCEDURE — 80053 COMPREHEN METABOLIC PANEL: CPT

## 2024-11-05 PROCEDURE — 73502 X-RAY EXAM HIP UNI 2-3 VIEWS: CPT

## 2024-11-05 RX ORDER — OXYCODONE AND ACETAMINOPHEN 5; 325 MG/1; MG/1
1 TABLET ORAL ONCE
Status: COMPLETED | OUTPATIENT
Start: 2024-11-05 | End: 2024-11-05

## 2024-11-05 RX ORDER — OXYCODONE AND ACETAMINOPHEN 5; 325 MG/1; MG/1
1 TABLET ORAL EVERY 6 HOURS PRN
Qty: 12 TABLET | Refills: 0 | Status: SHIPPED | OUTPATIENT
Start: 2024-11-05 | End: 2024-11-08

## 2024-11-05 RX ORDER — DEXAMETHASONE SODIUM PHOSPHATE 10 MG/ML
10 INJECTION INTRAMUSCULAR; INTRAVENOUS ONCE
Status: COMPLETED | OUTPATIENT
Start: 2024-11-05 | End: 2024-11-05

## 2024-11-05 RX ORDER — DEXAMETHASONE SODIUM PHOSPHATE 10 MG/ML
10 INJECTION INTRAMUSCULAR; INTRAVENOUS ONCE
Status: DISCONTINUED | OUTPATIENT
Start: 2024-11-05 | End: 2024-11-05

## 2024-11-05 RX ORDER — DEXAMETHASONE 6 MG/1
6 TABLET ORAL
Qty: 5 TABLET | Refills: 0 | Status: SHIPPED | OUTPATIENT
Start: 2024-11-05 | End: 2024-11-10

## 2024-11-05 RX ADMIN — OXYCODONE HYDROCHLORIDE AND ACETAMINOPHEN 1 TABLET: 5; 325 TABLET ORAL at 20:42

## 2024-11-05 RX ADMIN — OXYCODONE HYDROCHLORIDE AND ACETAMINOPHEN 1 TABLET: 5; 325 TABLET ORAL at 14:20

## 2024-11-05 RX ADMIN — DEXAMETHASONE SODIUM PHOSPHATE 10 MG: 10 INJECTION INTRAMUSCULAR; INTRAVENOUS at 14:20

## 2024-11-05 RX ADMIN — DEXAMETHASONE SODIUM PHOSPHATE 10 MG: 10 INJECTION INTRAMUSCULAR; INTRAVENOUS at 20:42

## 2024-11-05 ASSESSMENT — PAIN - FUNCTIONAL ASSESSMENT
PAIN_FUNCTIONAL_ASSESSMENT: PREVENTS OR INTERFERES SOME ACTIVE ACTIVITIES AND ADLS
PAIN_FUNCTIONAL_ASSESSMENT: 0-10

## 2024-11-05 ASSESSMENT — PAIN DESCRIPTION - FREQUENCY: FREQUENCY: CONTINUOUS

## 2024-11-05 ASSESSMENT — PAIN SCALES - GENERAL
PAINLEVEL_OUTOF10: 8
PAINLEVEL_OUTOF10: 9
PAINLEVEL_OUTOF10: 8

## 2024-11-05 ASSESSMENT — PAIN DESCRIPTION - ORIENTATION
ORIENTATION: RIGHT

## 2024-11-05 ASSESSMENT — PAIN DESCRIPTION - PAIN TYPE: TYPE: ACUTE PAIN;CHRONIC PAIN

## 2024-11-05 ASSESSMENT — PAIN DESCRIPTION - LOCATION
LOCATION: HIP

## 2024-11-05 ASSESSMENT — PAIN DESCRIPTION - DESCRIPTORS: DESCRIPTORS: STABBING

## 2024-11-05 NOTE — ED PROVIDER NOTES
HPI:  11/5/24, Time: 2:00 PM REBECCA Burroughs is a 63 y.o. male presenting to the ED for right-sided back pain radiating down his right leg beginning about 1 week ago.  Patient states he has used a cane for ambulation for the last several years however is normally able to function well at home and mow the lawn.  He states about 1 week ago he developed atraumatic pain to his lower back radiating down his right leg with associated numbness to his right leg.  Denies bowel or bladder incontinence or retention, saddle anesthesia, or fevers.  States he has been told he has had bulging disks in the past.  No recent back imaging.  He states that yesterday his leg gave out causing him to fall.  He did not hit his head or pass out.  Denies headache, neck pain, chest pain, shortness of breath, estela pain, nausea, vomiting, and focal weakness.  Patient has been able to ambulate with his cane but with difficulty.    I reviewed the patient's chart.  Patient seen by Eladio Rivas with neurosurgery on 4/13/2017 pain and numbness one year s/p C5-6 ACDF.  Patient underwent CT myelogram of his cervical spine at that time.    --------------------------------------------- PAST HISTORY ---------------------------------------------  Past Medical History:  has a past medical history of Anxiety, Arthritis, Depression, GERD (gastroesophageal reflux disease), Hepatitis C, HLD (hyperlipidemia), Hypertension, and Thyroid disease.    Past Surgical History:  has a past surgical history that includes Colonoscopy; Upper gastrointestinal endoscopy; hernia repair; Ankle fracture surgery (Right, [id number]); Ankle fracture surgery (Right, 01/19/2015); Hardware Removal (Right, 7/10/2015); repair retinal tear/hole (Bilateral); other surgical history (1/18/2016); fracture surgery; eye surgery; Endoscopy, colon, diagnostic; Abdomen surgery; cervical fusion (3/30/2016); and Dental surgery.    Social History:  reports that he has been smoking

## 2024-11-05 NOTE — TELEPHONE ENCOUNTER
I spoke to Kirill who is agreeable to the ER. He will go to [facility] for this.

## 2024-11-06 ENCOUNTER — TELEPHONE (OUTPATIENT)
Dept: FAMILY MEDICINE CLINIC | Age: 64
End: 2024-11-06

## 2024-11-06 NOTE — TELEPHONE ENCOUNTER
Patient was offered this afternoon and Friday, he has transportation issues, he cannot drive , they only have 1 car, wifes only day off is Tuesday, wife works till 4pm . It would take at least 45 minutes from home to office.

## 2024-11-06 NOTE — TELEPHONE ENCOUNTER
----- Message from Angel Luis MORRIS sent at 11/6/2024  9:50 AM EST -----  Regarding: E[facility] Appointment Request  ECC Appointment Request    Patient needs appointment for E[facility] Appointment Type: ED Follow-Up.    Patient Requested Dates(s): Tuesday ASAP  Patient Requested Time:  earlier the better  Provider Name:    Pablo Kumar MD    Reason for Appointment Request: Established Patient - Available appointments did not meet patient need/ Pt went to ED yesterday 11/5/2024 he can't walk wanted to have a follow up with her pcp. Pt needs a wheelchair for the appointment  --------------------------------------------------------------------------------------------------------------------------    Relationship to Patient: Self     Call Back Information: OK to leave message on voicemail  Preferred Call Back Number: Phone [phone]

## 2024-11-06 NOTE — ED NOTES
Patient signed out to me by outgoing provider plan for discharge pending MRI results and discussion with neurosurgery.  MRI demonstrated broad-based disc herniation at the L5 lumbar spine no central cord compression noted.  Transfer discussed with neurosurgery recommended discharge with p.o. pain medication as well as course of steroid.  Patient reevaluated pain is under control currently he is neurologically intact to his lower extremities.  Decision made to discharge patient.  Patient given prescription for Decadron as well as Percocet.  Patient instructed to follow-up with primary care doctor as well as neurosurgery strict return precautions discussed all questions answered patient in agreement plan of care discharged in stable condition.     King Ragsdale, DO  11/05/24 2014

## 2024-11-06 NOTE — TELEPHONE ENCOUNTER
No options that I can offer that meet those restrictions. Could possibly see any of the resident physicians at [facility] on Tuesday next week.

## 2024-11-06 NOTE — TELEPHONE ENCOUNTER
Can see Dr Woody or Galo this afternoon or Dr. Woody on Friday afternoon.   We've got wheelchairs up front.   Patient has a herniated disc in his low back. There's likely not much to do today, but document and talk about next steps if he doesn't get better over the next week or two.

## 2024-11-06 NOTE — TELEPHONE ENCOUNTER
The soonest appt I see on your schedule is the week of the 18th. Is it OK to wait that long to schedule the ED follow up? Please advise.

## 2024-11-07 DIAGNOSIS — I10 ESSENTIAL HYPERTENSION: ICD-10-CM

## 2024-11-08 DIAGNOSIS — I10 ESSENTIAL HYPERTENSION: ICD-10-CM

## 2024-11-08 RX ORDER — AMLODIPINE BESYLATE 5 MG/1
5 TABLET ORAL DAILY
Qty: 90 TABLET | Refills: 0 | OUTPATIENT
Start: 2024-11-08

## 2024-11-08 RX ORDER — AMLODIPINE BESYLATE 5 MG/1
5 TABLET ORAL DAILY
Qty: 90 TABLET | Refills: 1 | Status: SHIPPED | OUTPATIENT
Start: 2024-11-08

## 2024-11-08 NOTE — TELEPHONE ENCOUNTER
He had scheduled with Dr. LORENZANA on 11/25.  He does wish to see residency sooner.  He will call to schedule, hopefully something on Tuesday.    Will cancel 11/25 if he no longer needs it.

## 2024-11-25 ENCOUNTER — OFFICE VISIT (OUTPATIENT)
Dept: FAMILY MEDICINE CLINIC | Age: 64
End: 2024-11-25

## 2024-11-25 VITALS
DIASTOLIC BLOOD PRESSURE: 86 MMHG | OXYGEN SATURATION: 95 % | BODY MASS INDEX: 26.05 KG/M2 | HEIGHT: 74 IN | WEIGHT: 203 LBS | TEMPERATURE: 98.7 F | HEART RATE: 88 BPM | SYSTOLIC BLOOD PRESSURE: 136 MMHG

## 2024-11-25 DIAGNOSIS — Z00.00 MEDICARE ANNUAL WELLNESS VISIT, SUBSEQUENT: Primary | ICD-10-CM

## 2024-11-25 DIAGNOSIS — E03.4 HYPOTHYROIDISM DUE TO ACQUIRED ATROPHY OF THYROID: ICD-10-CM

## 2024-11-25 DIAGNOSIS — Z23 NEED FOR INFLUENZA VACCINATION: ICD-10-CM

## 2024-11-25 DIAGNOSIS — M51.26 HERNIATED INTERVERTEBRAL DISC OF LUMBAR SPINE: ICD-10-CM

## 2024-11-25 RX ORDER — LEVOTHYROXINE SODIUM 150 UG/1
150 TABLET ORAL DAILY
Qty: 90 TABLET | Refills: 1 | Status: SHIPPED | OUTPATIENT
Start: 2024-11-25

## 2024-11-25 RX ORDER — GABAPENTIN 300 MG/1
300 CAPSULE ORAL 3 TIMES DAILY PRN
Qty: 270 CAPSULE | Refills: 0 | Status: SHIPPED | OUTPATIENT
Start: 2024-11-25 | End: 2025-05-24

## 2024-11-25 RX ORDER — IBUPROFEN 800 MG/1
800 TABLET, FILM COATED ORAL 4 TIMES DAILY PRN
Qty: 56 TABLET | Refills: 0 | Status: SHIPPED | OUTPATIENT
Start: 2024-11-25 | End: 2024-12-09

## 2024-11-25 RX ORDER — OXYCODONE AND ACETAMINOPHEN 5; 325 MG/1; MG/1
1 TABLET ORAL 2 TIMES DAILY PRN
Qty: 28 TABLET | Refills: 0 | Status: SHIPPED | OUTPATIENT
Start: 2024-11-25 | End: 2024-12-09

## 2024-11-25 RX ORDER — ACETAMINOPHEN 325 MG/1
325 TABLET ORAL EVERY 6 HOURS PRN
Qty: 52 TABLET | Refills: 0
Start: 2024-11-25 | End: 2024-12-09

## 2024-11-25 ASSESSMENT — PATIENT HEALTH QUESTIONNAIRE - PHQ9
SUM OF ALL RESPONSES TO PHQ QUESTIONS 1-9: 2
SUM OF ALL RESPONSES TO PHQ QUESTIONS 1-9: 2
2. FEELING DOWN, DEPRESSED OR HOPELESS: SEVERAL DAYS
1. LITTLE INTEREST OR PLEASURE IN DOING THINGS: SEVERAL DAYS
SUM OF ALL RESPONSES TO PHQ9 QUESTIONS 1 & 2: 2
6. FEELING BAD ABOUT YOURSELF - OR THAT YOU ARE A FAILURE OR HAVE LET YOURSELF OR YOUR FAMILY DOWN: NOT AT ALL
SUM OF ALL RESPONSES TO PHQ QUESTIONS 1-9: 2
SUM OF ALL RESPONSES TO PHQ QUESTIONS 1-9: 2
1. LITTLE INTEREST OR PLEASURE IN DOING THINGS: SEVERAL DAYS
7. TROUBLE CONCENTRATING ON THINGS, SUCH AS READING THE NEWSPAPER OR WATCHING TELEVISION: NOT AT ALL
3. TROUBLE FALLING OR STAYING ASLEEP: NOT AT ALL
9. THOUGHTS THAT YOU WOULD BE BETTER OFF DEAD, OR OF HURTING YOURSELF: NOT AT ALL
SUM OF ALL RESPONSES TO PHQ QUESTIONS 1-9: 2
SUM OF ALL RESPONSES TO PHQ QUESTIONS 1-9: 2
8. MOVING OR SPEAKING SO SLOWLY THAT OTHER PEOPLE COULD HAVE NOTICED. OR THE OPPOSITE, BEING SO FIGETY OR RESTLESS THAT YOU HAVE BEEN MOVING AROUND A LOT MORE THAN USUAL: NOT AT ALL
SUM OF ALL RESPONSES TO PHQ QUESTIONS 1-9: 2
SUM OF ALL RESPONSES TO PHQ QUESTIONS 1-9: 2
4. FEELING TIRED OR HAVING LITTLE ENERGY: NOT AT ALL
2. FEELING DOWN, DEPRESSED OR HOPELESS: SEVERAL DAYS
SUM OF ALL RESPONSES TO PHQ9 QUESTIONS 1 & 2: 2
5. POOR APPETITE OR OVEREATING: NOT AT ALL
10. IF YOU CHECKED OFF ANY PROBLEMS, HOW DIFFICULT HAVE THESE PROBLEMS MADE IT FOR YOU TO DO YOUR WORK, TAKE CARE OF THINGS AT HOME, OR GET ALONG WITH OTHER PEOPLE: NOT DIFFICULT AT ALL

## 2024-11-25 ASSESSMENT — LIFESTYLE VARIABLES
HOW MANY STANDARD DRINKS CONTAINING ALCOHOL DO YOU HAVE ON A TYPICAL DAY: 1 OR 2
HOW OFTEN DO YOU HAVE A DRINK CONTAINING ALCOHOL: 2-4 TIMES A MONTH

## 2024-11-25 NOTE — PATIENT INSTRUCTIONS
Manjeet - 11/25/2024  Medicare offers a range of preventive health benefits. Some of the tests and screenings are paid in full while other may be subject to a deductible, co-insurance, and/or copay.    Some of these benefits include a comprehensive review of your medical history including lifestyle, illnesses that may run in your family, and various assessments and screenings as appropriate.    After reviewing your medical record and screening and assessments performed today your provider may have ordered immunizations, labs, imaging, and/or referrals for you.  A list of these orders (if applicable) as well as your Preventive Care list are included within your After Visit Summary for your review.    Other Preventive Recommendations:    A preventive eye exam performed by an eye specialist is recommended every 1-2 years to screen for glaucoma; cataracts, macular degeneration, and other eye disorders.  A preventive dental visit is recommended every 6 months.  Try to get at least 150 minutes of exercise per week or 10,000 steps per day on a pedometer .  Order or download the FREE \"Exercise & Physical Activity: Your Everyday Guide\" from [employer]. Call 1-620.944.2919 or search [employer] online.  You need 3545-9769 mg of calcium and 5313-5530 IU of vitamin D per day. It is possible to meet your calcium requirement with diet alone, but a vitamin D supplement is usually necessary to meet this goal.  When exposed to the sun, use a sunscreen that protects against both UVA and UVB radiation with an SPF of 30 or greater. Reapply every 2 to 3 hours or after sweating, drying off with a towel, or swimming.  Always wear a seat belt when traveling in a car. Always wear a helmet when riding a bicycle or motorcycle.

## 2024-11-25 NOTE — PROGRESS NOTES
Medicare Annual Wellness Visit    Kirill Burroughs is here for Medicare AWV and Follow-Up from Hospital (Recent [facility] ED follow up 11-05/24 - Lumbar disc herniation)    Assessment & Plan   Medicare annual wellness visit, subsequent  Overall Kirill Burroughs is doing well.   Age appropriate HM topics reviewed and updated where agreeable.   Vaccines reviewed and updated where agreeable.   Age appropriate anticipatory guidance discussed.   Encouraged to work on healthy diet and exercise strategies.   Opportunity to ask questions and answers provided as able.     Patient's Medicare annual wellness visits topics were covered and screen for.  Much of the time was spent today coming up with pain management plan for his recent lumbar herniated disc for which she is having neurosurgical evaluation on December 9.    Recommend RTO in 1 year for annual physical.       Hypothyroidism due to acquired atrophy of thyroid  -     levothyroxine (SYNTHROID) 150 MCG tablet; Take 1 tablet by mouth daily, Disp-90 tablet, R-1Normal    Need for influenza vaccination  -     Influenza, FLUCELVAX Trivalent, (age 6 mo+) IM, Preservative Free, 0.5mL    Herniated intervertebral disc of lumbar spine  -     gabapentin (NEURONTIN) 300 MG capsule; Take 1 capsule by mouth 3 times daily as needed (nerve pain) for up to 180 days., Disp-270 capsule, R-0Normal  -     ibuprofen (ADVIL;MOTRIN) 800 MG tablet; Take 1 tablet by mouth 4 times daily as needed for Pain, Disp-56 tablet, R-0Normal  -     oxyCODONE-acetaminophen (PERCOCET) 5-325 MG per tablet; Take 1 tablet by mouth 2 times daily as needed for Pain for up to 14 days. Intended supply: 7 days. Take lowest dose possible to manage pain Max Daily Amount: 2 tablets, Disp-28 tablet, R-0Normal  -     acetaminophen (TYLENOL) 325 MG tablet; Take 1 tablet by mouth every 6 hours as needed for Pain, Disp-52 tablet, R-0NO PRINT  -     DME Order for (Specify) as OP    Pain management plan as above.  He does have a

## 2024-12-09 ENCOUNTER — OFFICE VISIT (OUTPATIENT)
Dept: NEUROSURGERY | Age: 64
End: 2024-12-09
Payer: MEDICARE

## 2024-12-09 DIAGNOSIS — M51.26 LUMBAR HERNIATED DISC: Primary | ICD-10-CM

## 2024-12-09 PROCEDURE — 3017F COLORECTAL CA SCREEN DOC REV: CPT | Performed by: PHYSICIAN ASSISTANT

## 2024-12-09 PROCEDURE — G8484 FLU IMMUNIZE NO ADMIN: HCPCS | Performed by: PHYSICIAN ASSISTANT

## 2024-12-09 PROCEDURE — G8419 CALC BMI OUT NRM PARAM NOF/U: HCPCS | Performed by: PHYSICIAN ASSISTANT

## 2024-12-09 PROCEDURE — 4004F PT TOBACCO SCREEN RCVD TLK: CPT | Performed by: PHYSICIAN ASSISTANT

## 2024-12-09 PROCEDURE — G8427 DOCREV CUR MEDS BY ELIG CLIN: HCPCS | Performed by: PHYSICIAN ASSISTANT

## 2024-12-09 PROCEDURE — 99203 OFFICE O/P NEW LOW 30 MIN: CPT | Performed by: PHYSICIAN ASSISTANT

## 2024-12-09 ASSESSMENT — ENCOUNTER SYMPTOMS
RESPIRATORY NEGATIVE: 1
BACK PAIN: 1
ALLERGIC/IMMUNOLOGIC NEGATIVE: 1
EYES NEGATIVE: 1
GASTROINTESTINAL NEGATIVE: 1

## 2024-12-09 NOTE — PROGRESS NOTES
Subjective   Patient ID: Kirill Burroughs is a 64 y.o. male.    Back Pain  This is a new problem. The current episode started 1 to 4 weeks ago. The problem occurs constantly. The problem has been gradually worsening since onset. The pain is present in the lumbar spine (and right leg pain into the foot.). The quality of the pain is described as aching. The pain is at a severity of 9/10. The symptoms are aggravated by twisting, standing and bending. Pertinent negatives include no bladder incontinence. He has tried NSAIDs, analgesics, heat and ice (percocet, gabapentin) for the symptoms. The treatment provided mild relief.       Review of Systems   Constitutional: Negative.    HENT: Negative.     Eyes: Negative.    Respiratory: Negative.     Cardiovascular: Negative.    Gastrointestinal: Negative.    Endocrine: Negative.    Genitourinary: Negative.  Negative for bladder incontinence.   Musculoskeletal:  Positive for back pain.   Skin: Negative.    Allergic/Immunologic: Negative.    Neurological: Negative.    Hematological: Negative.    Psychiatric/Behavioral: Negative.            Objective   Physical Exam  Constitutional:       Appearance: Normal appearance.   HENT:      Head: Normocephalic and atraumatic.      Nose: Nose normal.   Eyes:      Pupils: Pupils are equal, round, and reactive to light.   Pulmonary:      Effort: Pulmonary effort is normal.   Abdominal:      General: There is no distension.   Skin:     General: Skin is warm and dry.   Neurological:      Mental Status: He is alert.      GCS: GCS eye subscore is 4. GCS verbal subscore is 5. GCS motor subscore is 6.      Cranial Nerves: Cranial nerves 2-12 are intact.      Sensory: Sensation is intact.      Motor: Motor function is intact.      Gait: Gait abnormal.   Psychiatric:         Mood and Affect: Mood normal.            Assessment   64 year old male with severe low back and right leg pain/numbness x 5 weeks.  Lumbar MRI reveals large right L4-5 disc

## 2025-01-02 NOTE — PROGRESS NOTES
Infectious Disease    HPI:  Mr Watkins is a 68 year old male with subacute illness/URI sxs more recently with progressive dyspnea then fever.  ID is following patient for pneumonia    1/2/2025 - events noted; remains on NC O2 up to 4 L/min and then up to 15 L/min when seen.  Impending transfer to ICU.  Cough minimal productive with clear phlegm.  + wheezing.  No rash.     ROS:  As above, remainder of review unremarkable.    Current Medications  Current Facility-Administered Medications   Medication Dose Route Frequency Provider Last Rate Last Admin    methylPREDNISolone (SOLU-Medrol) injection 40 mg  40 mg Intravenous 2 times per day Dell Patricia MD   40 mg at 01/01/25 2146    guaiFENesin (MUCINEX) ER tablet 1,200 mg  1,200 mg Oral 2 times per day Dell Patricia MD   1,200 mg at 01/01/25 2147    loratadine (CLARITIN) tablet 10 mg  10 mg Oral QAM AC Dell Patricia MD   10 mg at 01/02/25 0524    metroNIDAZOLE (FLAGYL) tablet 500 mg  500 mg Oral TID Tesha Riggins PA-C   500 mg at 01/01/25 2147    vancomycin 1,750 mg in sodium chloride 0.9% 500 mL IVPB  1,750 mg Intravenous 2 times per day Marek Marin  mL/hr at 01/02/25 0518 1,750 mg at 01/02/25 0518    pantoprazole (PROTONIX) EC tablet 40 mg  40 mg Oral QAM AC Dell Patricia MD   40 mg at 01/02/25 0524    CARBOXYMethylcellulose (REFRESH TEARS) 0.5 % ophthalmic solution 2 drop  2 drop Both Eyes TID Dell Patricia MD   2 drop at 01/01/25 0829    sodium chloride 0.9 % injection 2 mL  2 mL Intracatheter 2 times per day Doug Amin MD   2 mL at 01/01/25 2146    ceFEPIme (MAXIPIME) 2,000 mg in sodium chloride 0.9 % 100 mL IVPB  2,000 mg Intravenous 3 times per day Dell Patricia MD 25 mL/hr at 01/02/25 0727 2,000 mg at 01/02/25 0727    VANCOMYCIN - PHARMACIST MONITORED Misc   Does not apply See Admin Instructions Isidro Allen DO        acyclovir (ZOVIRAX) tablet 800 mg  800 mg Oral BID Isidro Allen DO   800 mg at 01/01/25 2144    flecainide  Pt has not had blood pressure or assessment yet, as pt has been off floor in stress lab. (TAMBOCOR) tablet 150 mg  150 mg Oral BID AllenIsidro JACE, DO   150 mg at 01/01/25 2147    rivaroxaban (XARELTO) tablet 20 mg  20 mg Oral Daily with dinner Isidro Allen JACE, DO   20 mg at 01/01/25 1719     Allergies  ALLERGIES:   Allergen Reactions    Ibuprofen SWELLING and Other (See Comments)     Puffiness in the face   9/13/2022 - pt reports having medication while in hospital a few years ago and has not had a reaction     Immune Globulin Other (See Comments)     Rigors after he received one third of 400 mg/kg dose.  Resolved with Solu-Medrol, but had a delayed reaction.    Penicillins      As a child he was told     Sulfa Antibiotics RASH     Headache     Physical Exam  Visit Vitals  /89 (BP Location: LUE - Left upper extremity, Patient Position: Semi-Juarez's)   Pulse 70   Temp 98.9 °F (37.2 °C) (Oral)   Resp 18   Ht 5' 10\" (1.778 m)   Wt 70.5 kg (155 lb 6.8 oz)   SpO2 91%   BMI 22.30 kg/m²     Temp:  [98.4 °F (36.9 °C)-99.5 °F (37.5 °C)] 98.9 °F (37.2 °C)  Heart Rate:  [70-71] 70  Resp:  [16-18] 18  BP: (128-133)/(75-89) 128/89  I/O last 3 completed shifts:  In: 280 [P.O.:280]  Out: -   No intake/output data recorded.    General:  A 68 year old male in NAD, sitting up in bed.  Looks fatigued    HEENT:  NCAT, sclera without icterus, no conjunctival injection. External ears/nares normal.  No oral lesions  Cardiac:  RRR with S1, S2.  No murmur appreciated.    Lungs:  L>R basilar rales on inspiration.  On 15 L/min  Extremities:  no peripheral edema.  No joint effusions noted. No cyanosis.   Integumentary:  No rash noted throughout   Lines:  PIV ok    Labs  Recent Labs   Lab 01/02/25  0848 12/30/24  0611 12/29/24  0553   WBC 8.1 10.8 8.9   RBC 3.93* 3.56* 3.92*   HGB 13.2 11.9* 13.2   HCT 38.7* 35.4* 39.2    176 176     Recent Labs   Lab 01/02/25  0848 01/01/25  0902 12/30/24  0611 12/29/24  0553   SODIUM 138  --  137 138   CHLORIDE 100  --  102 103   CO2 29  --  28 29   BUN 23*  --  23* 23*   CREATININE 0.73  0.69 0.75 0.76   CALCIUM 8.6  --  8.7 9.1   ALBUMIN 2.2*  --  2.1* 2.3*   BILIRUBIN 0.6  --  0.3 0.4   ALKPT 118*  --  136* 143*   GPT 38  --  51 66*   AST 19  --  21 29   GLUCOSE 106*  --  124* 137*       Micro Data:   BCX - ngtd   MRSA nares - neg   resp path panel - + rhinovirus again (had 12/15 as well)    sputum cx - mod normal leno     blasto ag P   Histo ag Neg    Imagin/23 CT chest-    IMPRESSION:    1. Interval progression of diffuse centrilobular micronodularity within  bilateral lung bases, right middle lobe, and inferior aspect of the right  upper lobe with interval progression of airspace consolidations in  bilateral lower lobes and right middle lobe. The findings again raise the  possibility for an infectious/inflammatory process, possibly an atypical  infectious/inflammatory process.     2. Stable osseous lesions, likely the patient's history of myeloma. Stable  compression deformities without acute compression deformity.    Complex data reviewed on 2025 :  CBC, CMP today  Cultures:  histo ag  Notes reviewed: hospitalist notes     Assessment  Frederick Watkins is a 68 year old male we are consulted for management of the followin. Fever with ongoing cough, dyspnea    - given courses of abx he has been on, ? MRSA post viral (though nares PCR neg) vs a pseudomonas although he does not recall improvement while on levaquin.  He did feel better on vanco / cefepime regimen as inpatient however  - rhinovirus + on resp panel.  GPC on sputum gram stain   - leading concern given course and his drug therapy is Carfilzomib induced pneumonitis given association.  Diagnosis of exclusion, so also will evaluate vasculitis but thus far MPO and P3 neg along with ERICA, normal complements.  - on steroids now for potential drug induced pneumonitis, increased O2 needs to 15 L/min.  Need definitive diagnosis    2.  Hx of multiple myeloma - on bactrim and acyclovir PPx  - on  daratumumab/carfilzomib as per Katie    3.  Paroxysmal a-fib - on flecainide, xarelto  - should avoid flouroquinolone abx while on flecainide    4,  IVIG administration reaction - given solumedrol 12/25 after reaction.   - IgA, IgM all low, IgG in 460's.     5.  Sinusitis + dental abscess - no micro, patient notes some L upper molar pain since root canal months ago, too costly to have tooth removed back then  - no active drainage.     Recommendations  - continue present abx for sinusitis / dental infection  - discussed with Dr Jean Baptiste.  Being transferred to ICU, anticipate intubation and bronchoscopy and eval for opportunistic infection given no significant improvement on steroids and need definitive diagnosis. Will be holding steroids for now  - present abx for now pending bronch  - repeat CT chest    Discussed treatment management with: Dr Luu, Dr Jean Baptiste    Patient discussed in detail with Dr. Marin.  All pertinent labs, microbiology data, imaging studies discussed.  Assessment and plan created in close collaboration with Dr. Marin.   Cecil Richardson PA-C  1/2/2025    Addendum:  d/w Dr Marin, will broaden further to merrem / vanco.  Cecil Richardson PA-C      ID Staff:  Labs, XRay and vitals personally reviewed  Patient seen, above reflect my findings and A/P  Intubated  About to be Bronched  Relevant notes reviewed  Above discussed with PA and A/P created in collaboration     Complex data reviewed on 1/2/2025 :  CBC, CMP today  Cultures: 12/28 histo ag  Notes reviewed: hospitalist notes   Discussed with critical care    JOSE ALEJANDRO Marin DO

## 2025-01-14 ENCOUNTER — OFFICE VISIT (OUTPATIENT)
Dept: PAIN MANAGEMENT | Age: 65
End: 2025-01-14
Payer: MEDICARE

## 2025-01-14 VITALS
SYSTOLIC BLOOD PRESSURE: 159 MMHG | RESPIRATION RATE: 16 BRPM | BODY MASS INDEX: 26.9 KG/M2 | OXYGEN SATURATION: 93 % | DIASTOLIC BLOOD PRESSURE: 93 MMHG | HEIGHT: 73 IN | TEMPERATURE: 97.2 F | WEIGHT: 203 LBS | HEART RATE: 82 BPM

## 2025-01-14 DIAGNOSIS — M54.16 LUMBAR RADICULOPATHY: Primary | ICD-10-CM

## 2025-01-14 PROCEDURE — G8419 CALC BMI OUT NRM PARAM NOF/U: HCPCS | Performed by: STUDENT IN AN ORGANIZED HEALTH CARE EDUCATION/TRAINING PROGRAM

## 2025-01-14 PROCEDURE — 3080F DIAST BP >= 90 MM HG: CPT | Performed by: STUDENT IN AN ORGANIZED HEALTH CARE EDUCATION/TRAINING PROGRAM

## 2025-01-14 PROCEDURE — 99204 OFFICE O/P NEW MOD 45 MIN: CPT | Performed by: STUDENT IN AN ORGANIZED HEALTH CARE EDUCATION/TRAINING PROGRAM

## 2025-01-14 PROCEDURE — 3017F COLORECTAL CA SCREEN DOC REV: CPT | Performed by: STUDENT IN AN ORGANIZED HEALTH CARE EDUCATION/TRAINING PROGRAM

## 2025-01-14 PROCEDURE — G8427 DOCREV CUR MEDS BY ELIG CLIN: HCPCS | Performed by: STUDENT IN AN ORGANIZED HEALTH CARE EDUCATION/TRAINING PROGRAM

## 2025-01-14 PROCEDURE — 3077F SYST BP >= 140 MM HG: CPT | Performed by: STUDENT IN AN ORGANIZED HEALTH CARE EDUCATION/TRAINING PROGRAM

## 2025-01-14 PROCEDURE — 4004F PT TOBACCO SCREEN RCVD TLK: CPT | Performed by: STUDENT IN AN ORGANIZED HEALTH CARE EDUCATION/TRAINING PROGRAM

## 2025-01-14 RX ORDER — SODIUM CHLORIDE 9 MG/ML
INJECTION, SOLUTION INTRAVENOUS PRN
OUTPATIENT
Start: 2025-01-14

## 2025-01-14 RX ORDER — SODIUM CHLORIDE 0.9 % (FLUSH) 0.9 %
5-40 SYRINGE (ML) INJECTION PRN
OUTPATIENT
Start: 2025-01-14

## 2025-01-14 RX ORDER — SODIUM CHLORIDE 0.9 % (FLUSH) 0.9 %
5-40 SYRINGE (ML) INJECTION EVERY 12 HOURS SCHEDULED
OUTPATIENT
Start: 2025-01-14

## 2025-01-14 RX ORDER — GABAPENTIN 100 MG/1
100 CAPSULE ORAL 3 TIMES DAILY
Qty: 90 CAPSULE | Refills: 0 | Status: SHIPPED | OUTPATIENT
Start: 2025-01-14 | End: 2025-02-13

## 2025-01-14 NOTE — PROGRESS NOTES
[facility] - Pain Medicine  [address] [place] [address]  Pain Medicine Consult Note    Patient:  TOMER Barriga 1960  Date of Service:  25  Requesting Physician:  Eladio Rivas PA  Chief Complaint: Consultation (Right lower back)      HISTORY OF PRESENT ILLNESS:      Mr. Kirill Burroughs is a 64 y.o. male presented today for evaluation of  low back and left leg pain  that has been ongoing for the past 3 months     He   has a past medical history of ROSEMARY, MDD, OA, GERD, HLD, HTN, Hep C, Thyroid disease, C5/6 ACDF.      Blood Thinners/Anticoagulation: no  Herbal Supplements: yes - fish oil   Pertinent Allergies: no  Diabetic: no  Bowel/Bladder Incontinence: no  Relevant Surgeries: Yes  C5/6 ACDF  Relevant Recent procedures: No     Pain:  Location: lower right back  Inciting Factor: n/a  Duration: 2 months  Description: constant  Quality: aching, burning, dull, sharp, and stabbing.    Level (worst): 10  Radiation:  yes - right leg  to foot   Numbness/Tingling: yes - right foot  Aggravating factors: movement, sitting.   Alleviating factors: nothing.     Blood Thinners/Anticoagulation:  no                Herbal Supplements: no     Medications:    NSAID's :  Ibuprofen q 6 hrs  Tylenol: No   Patches/Gels:    no  Membrane stabilizers :   Current -  gabapentin 300mg TID   Previous - no  Opioids :   Current -  oxycodone/acetaminophen (Percocet) from the ED   Previous -   no   Muscle Relaxants:    no  Steroids: dexamethasone    Benzodiazepines:  klonopin 2mg TID , temazepam   Anti-depres/Anti-Pscyh: prozac   Adjuvants or Others : no       Previous treatments:    Physical Therapy : No    Chiropractic treatment: No   TENS Unit: No   Previous Pain Physicians: no   Previous Procedure: no     Current Medications:   B Complex Vitamins, EQL Omega 3 Fish Oil, FLUoxetine, Handicap Placard, Multi-Vitamin Daily, acetaminophen, amLODIPine, azelastine, clonazePAM,

## 2025-01-14 NOTE — PROGRESS NOTES
Patient:  Kirill Burroughs,  1960  Date of Service:  25      Patient presents to [facility] with complaints of lower right back pain     Pain:  Location: lower right back  Inciting Factor: n/a  Duration: 2 months  Description: constant  Quality: aching, burning, dull, sharp, and stabbing.    Level (worst): 10  Radiation:  yes - right leg  Numbness/Tingling: yes - right foot  Aggravating factors: movement, sitting.   Alleviating factors: nothing.    Blood Thinners/Anticoagulation:  no     Herbal Supplements: no    Medications:    NSAID's :   4 motrin q 6 hrs  Tylenol: No   Patches/Gels:    no  Membrane stabilizers :   Current -  gabapentin (NEURONTIN)  Previous - gabapentin (NEURONTIN)  Opioids :   Current -  oxycodone/acetaminophen (Percocet) from the ED   Previous -   no   Muscle Relaxants:    no  Steroids: no   Benzodiazepines:  klonopin  Anti-depres/Anti-Pscyh: prozac   Adjuvants or Others : no      Previous treatments:    Physical Therapy : No    Chiropractic treatment: No   TENS Unit: No   Previous Pain Physicians: no   Previous Procedure: no     Do you want someone present when the provider examines you? No    BP (!) 159/93   Pulse 82   Temp 97.2 °F (36.2 °C) (Infrared)   Resp 16   Ht 1.854 m (6' 1\")   Wt 92.1 kg (203 lb)   SpO2 93%   BMI 26.78 kg/m²     No LMP for male patient.

## 2025-01-17 NOTE — PROGRESS NOTES
[facility] PAIN MANAGEMENT  INSTRUCTIONS  ...........................................................................................................................................     [x] Parking the day of Surgery is located in the Main Entrance lot.  Upon entering the door, make immediate right into the surgery reception room    [x]  Bring photo ID and insurance card     [x] You may have a light breakfast day of procedure    [x]  Wear loose comfortable clothing    [x]  Please follow instructions for medications as given per Dr's office    [x] You can expect a call the business day prior to procedure to notify you of your arrival time     [x] Please arrange for     []  Other instructions

## 2025-01-21 ENCOUNTER — HOSPITAL ENCOUNTER (OUTPATIENT)
Age: 65
Setting detail: OUTPATIENT SURGERY
Discharge: HOME | End: 2025-01-21
Attending: STUDENT IN AN ORGANIZED HEALTH CARE EDUCATION/TRAINING PROGRAM | Admitting: STUDENT IN AN ORGANIZED HEALTH CARE EDUCATION/TRAINING PROGRAM
Payer: MEDICARE

## 2025-01-21 ENCOUNTER — HOSPITAL ENCOUNTER (OUTPATIENT)
Dept: GENERAL RADIOLOGY | Age: 65
Setting detail: OUTPATIENT SURGERY
Discharge: HOME | End: 2025-01-23
Attending: STUDENT IN AN ORGANIZED HEALTH CARE EDUCATION/TRAINING PROGRAM
Payer: MEDICARE

## 2025-01-21 VITALS
DIASTOLIC BLOOD PRESSURE: 64 MMHG | HEART RATE: 65 BPM | SYSTOLIC BLOOD PRESSURE: 124 MMHG | OXYGEN SATURATION: 95 % | WEIGHT: 200 LBS | RESPIRATION RATE: 16 BRPM | BODY MASS INDEX: 26.51 KG/M2 | HEIGHT: 73 IN

## 2025-01-21 DIAGNOSIS — R52 PAIN MANAGEMENT: ICD-10-CM

## 2025-01-21 PROCEDURE — 2709999900 HC NON-CHARGEABLE SUPPLY: Performed by: STUDENT IN AN ORGANIZED HEALTH CARE EDUCATION/TRAINING PROGRAM

## 2025-01-21 PROCEDURE — 62323 NJX INTERLAMINAR LMBR/SAC: CPT | Performed by: STUDENT IN AN ORGANIZED HEALTH CARE EDUCATION/TRAINING PROGRAM

## 2025-01-21 PROCEDURE — 6360000004 HC RX CONTRAST MEDICATION: Performed by: STUDENT IN AN ORGANIZED HEALTH CARE EDUCATION/TRAINING PROGRAM

## 2025-01-21 PROCEDURE — 3600000002 HC SURGERY LEVEL 2 BASE: Performed by: STUDENT IN AN ORGANIZED HEALTH CARE EDUCATION/TRAINING PROGRAM

## 2025-01-21 PROCEDURE — 7100000010 HC PHASE II RECOVERY - FIRST 15 MIN: Performed by: STUDENT IN AN ORGANIZED HEALTH CARE EDUCATION/TRAINING PROGRAM

## 2025-01-21 PROCEDURE — 6360000002 HC RX W HCPCS: Performed by: STUDENT IN AN ORGANIZED HEALTH CARE EDUCATION/TRAINING PROGRAM

## 2025-01-21 PROCEDURE — 7100000011 HC PHASE II RECOVERY - ADDTL 15 MIN: Performed by: STUDENT IN AN ORGANIZED HEALTH CARE EDUCATION/TRAINING PROGRAM

## 2025-01-21 RX ORDER — SODIUM CHLORIDE 0.9 % (FLUSH) 0.9 %
5-40 SYRINGE (ML) INJECTION EVERY 12 HOURS SCHEDULED
Status: DISCONTINUED | OUTPATIENT
Start: 2025-01-21 | End: 2025-01-21 | Stop reason: HOSPADM

## 2025-01-21 RX ORDER — SODIUM CHLORIDE 0.9 % (FLUSH) 0.9 %
5-40 SYRINGE (ML) INJECTION PRN
Status: DISCONTINUED | OUTPATIENT
Start: 2025-01-21 | End: 2025-01-21 | Stop reason: HOSPADM

## 2025-01-21 RX ORDER — IOPAMIDOL 612 MG/ML
INJECTION, SOLUTION INTRATHECAL PRN
Status: DISCONTINUED | OUTPATIENT
Start: 2025-01-21 | End: 2025-01-21 | Stop reason: ALTCHOICE

## 2025-01-21 RX ORDER — METHYLPREDNISOLONE ACETATE 40 MG/ML
INJECTION, SUSPENSION INTRA-ARTICULAR; INTRALESIONAL; INTRAMUSCULAR; SOFT TISSUE PRN
Status: DISCONTINUED | OUTPATIENT
Start: 2025-01-21 | End: 2025-01-21 | Stop reason: ALTCHOICE

## 2025-01-21 RX ORDER — SODIUM CHLORIDE 9 MG/ML
INJECTION, SOLUTION INTRAVENOUS PRN
Status: DISCONTINUED | OUTPATIENT
Start: 2025-01-21 | End: 2025-01-21 | Stop reason: HOSPADM

## 2025-01-21 RX ORDER — LIDOCAINE HYDROCHLORIDE 5 MG/ML
INJECTION, SOLUTION INFILTRATION; INTRAVENOUS PRN
Status: DISCONTINUED | OUTPATIENT
Start: 2025-01-21 | End: 2025-01-21 | Stop reason: ALTCHOICE

## 2025-01-21 NOTE — DISCHARGE INSTRUCTIONS
[facility] Pain Management Department  [address] [phone]  Dr. Tara Rae   Post-Pain Block/Radiofrequency  Home Going Instructions    1-Go home, rest for the remainder of the day  2-Please do not lift over 20 pounds the day of the injection  3-If you received sedation No: alcohol, driving, operating lawn mowers, plows, tractors or other dangerous equipment until next morning. Do not make important decisions or sign legal documents for 24 hours. You may experience light headedness, dizziness, nausea or sleepiness after sedation. Do not stay alone. A responsible adult must be with you for 24 hours. You could be nauseated from the medications you have received. Your IV site may be sore and bruised.    4-No dietary restrictions     5-Resume all medications the same day, blood thinners to be resumed 24 hours after injection if you were instructed to stop any.    6-Keep the surgical site clean and dry, you may shower the next morning and remove the      dressing.     7- No sitz baths, tub baths or hot tubs/swimming for 24 hours.       8- If you have any pain at the injection site(s), application of an ice pack to the area should be       helpful, 20 minutes on/20 minutes off for next 48 hours.  9- Call [facility] immediately at if you develop.  Fever greater than 100.4 F  Have bleeding or drainage from the puncture site  Have progressive Leg/arm numbness and or weakness  Loss of control of bowel and or bladder (wet/soil yourself)  Severe headache with inability to lift head  10-You may return to work the next day

## 2025-01-21 NOTE — OP NOTE
2025    Patient: Kirill Burroughs  :  1960  Age:  64 y.o.  Sex:  male     PRE-OPERATIVE DIAGNOSIS: Lumbar radiculopathy     POST-OPERATIVE DIAGNOSIS: Same.    PROCEDURE: Fluoroscopic guided therapeutic lumbar epidural steroid injection at the L4/5 level .    SURGEON:  Katja Romo MD    ANESTHESIA: Local    ESTIMATED BLOOD LOSS: None.  ______________________________________________________________________    BRIEF HISTORY:  Kirill Burroughs comes in today for  lumbar epidural injection at L4/5 level. The potential complications of this procedure were discussed with him again today.  He has elected to undergo the aforementioned procedure.    Kirill’s complete History & Physical examination were reviewed in depth, a copy of which is in the chart.      DESCRIPTION OF PROCEDURE:    After confirming written and informed consent, a time-out was performed and Kirill’s name and date of birth, the procedure to be performed as well as the plan for the location of the needle insertion were confirmed.    The patient was brought into the procedure room and placed in the prone position on the fluoroscopy table. A pillow was placed under the patient's lower abdomen/upper pelvis to increase lumbar interlaminar space. Standard monitors were placed, and vital signs were observed throughout the procedure. The area of the lumbar spine was prepped with chloraprep and draped in a sterile manner. The L4/5 interspace was identified and marked under AP fluoroscopy. The skin and subcutaneous tissues at the above level were anesthestized with 0.5% lidocaine. With intermittent fluoroscopy, an 18 gauge 3.5 \" tuohy epidural needle was inserted and directed toward the interlaminar space. The needle was slowly advanced using loss of resistance technique and 5 cc glass syringe  until the tip of the epidural needle has passed through the ligamentum flavum and entered the epidural space. AP and lateral fluoroscopic imaging is

## 2025-01-21 NOTE — H&P
[facility]  Pain Medicine  [address] [place] [address]    Procedure History & Physical      Kirill NAZARIO Manjeet     HPI:    Patient  is here with  CLBP, RLE  pain for L4/5 VISHAL  Labs/imaging studies reviewed   All question and concerns addressed including R/B/A associated with the procedure    Past Medical History:   Diagnosis Date    Anxiety     Arthritis     Depression     GERD (gastroesophageal reflux disease)     Hepatitis C     treated    HLD (hyperlipidemia) 06/19/2019    Hypertension     Thyroid disease        Past Surgical History:   Procedure Laterality Date    ABDOMEN SURGERY      PYLORIC STENOSIS    ANKLE FRACTURE SURGERY Right 01/19/2015    ANKLE FRACTURE SURGERY Right 01/19/2015    CERVICAL FUSION  03/30/2016    acdf c5-c6    COLONOSCOPY      DENTAL SURGERY      tooth abscess    ENDOSCOPY, COLON, DIAGNOSTIC      EYE SURGERY      FRACTURE SURGERY      HARDWARE REMOVAL Right 07/10/2015    Right    HERNIA REPAIR      OTHER SURGICAL HISTORY  01/18/2016    fluiroscopy guided cervical myelogram with conpated tomography    REPAIR RETINAL TEAR/HOLE Bilateral     UPPER GASTROINTESTINAL ENDOSCOPY         Prior to Admission medications    Medication Sig Start Date End Date Taking? Authorizing Provider   tiZANidine (ZANAFLEX) 4 MG tablet Take 1 tablet by mouth nightly as needed (spasms) 1/14/25 11/10/25 Yes Katja Romo MD   gabapentin (NEURONTIN) 100 MG capsule Take 1 capsule by mouth 3 times daily for 30 days. In addition to gabapentin 300 mg 1/14/25 2/13/25 Yes Katja oRmo MD   levothyroxine (SYNTHROID) 150 MCG tablet Take 1 tablet by mouth daily 11/25/24  Yes Pablo Kumar MD   amLODIPine (NORVASC) 5 MG tablet Take 1 tablet by mouth daily for blood pressure 11/8/24  Yes Pablo Kumar MD   pravastatin (PRAVACHOL) 20 MG tablet Take 1 tablet by mouth once daily 10/14/24  Yes Pablo Kumar MD   omeprazole (PRILOSEC) 40 MG

## 2025-02-13 ENCOUNTER — TELEPHONE (OUTPATIENT)
Dept: FAMILY MEDICINE CLINIC | Age: 65
End: 2025-02-13

## 2025-02-13 NOTE — TELEPHONE ENCOUNTER
Doubt this is dangerous. Can happen for a few different reasons. If no pain or trouble swallowing, probably can wait and see.

## 2025-02-13 NOTE — TELEPHONE ENCOUNTER
Patient called w/ concerns that the center of his tongue is black. He brushed his tongue and some of the blackness came off, edges of tongue are pink.     States felt sick last night after eating [employer] pizza. He felt \"bound up\" but then had diarrhea. Vomiting x 3. Vomit did not contain meat or bread, only red sauce and mucus. Patient took 2 tablets of Imodium at 2am and has not had another occurrence of diarrhea. States that he feels \"fine\" today but is worried about his back tongue and if it is dangerous.     Advised patient to come through [facility] for eval. He is unable to come today because his wife has the car. He may come tomorrow morning if color does not improve.

## 2025-02-14 NOTE — TELEPHONE ENCOUNTER
Left a detailed message for Kirill with Dr Kumar's response. Asked him to call back with any questions

## 2025-02-25 ENCOUNTER — OFFICE VISIT (OUTPATIENT)
Dept: FAMILY MEDICINE CLINIC | Age: 65
End: 2025-02-25
Payer: MEDICARE

## 2025-02-25 VITALS
WEIGHT: 210 LBS | HEIGHT: 73 IN | DIASTOLIC BLOOD PRESSURE: 64 MMHG | OXYGEN SATURATION: 95 % | TEMPERATURE: 98.8 F | SYSTOLIC BLOOD PRESSURE: 122 MMHG | BODY MASS INDEX: 27.83 KG/M2 | HEART RATE: 99 BPM

## 2025-02-25 DIAGNOSIS — R10.32 LLQ PAIN: Primary | ICD-10-CM

## 2025-02-25 DIAGNOSIS — N40.1 BENIGN PROSTATIC HYPERPLASIA WITH POST-VOID DRIBBLING: ICD-10-CM

## 2025-02-25 DIAGNOSIS — E78.5 HYPERLIPIDEMIA, UNSPECIFIED HYPERLIPIDEMIA TYPE: ICD-10-CM

## 2025-02-25 DIAGNOSIS — M51.26 HERNIATED INTERVERTEBRAL DISC OF LUMBAR SPINE: ICD-10-CM

## 2025-02-25 DIAGNOSIS — N39.43 BENIGN PROSTATIC HYPERPLASIA WITH POST-VOID DRIBBLING: ICD-10-CM

## 2025-02-25 PROCEDURE — 3017F COLORECTAL CA SCREEN DOC REV: CPT | Performed by: FAMILY MEDICINE

## 2025-02-25 PROCEDURE — 3078F DIAST BP <80 MM HG: CPT | Performed by: FAMILY MEDICINE

## 2025-02-25 PROCEDURE — 4004F PT TOBACCO SCREEN RCVD TLK: CPT | Performed by: FAMILY MEDICINE

## 2025-02-25 PROCEDURE — G8419 CALC BMI OUT NRM PARAM NOF/U: HCPCS | Performed by: FAMILY MEDICINE

## 2025-02-25 PROCEDURE — 99214 OFFICE O/P EST MOD 30 MIN: CPT | Performed by: FAMILY MEDICINE

## 2025-02-25 PROCEDURE — G8427 DOCREV CUR MEDS BY ELIG CLIN: HCPCS | Performed by: FAMILY MEDICINE

## 2025-02-25 PROCEDURE — 3074F SYST BP LT 130 MM HG: CPT | Performed by: FAMILY MEDICINE

## 2025-02-25 RX ORDER — PRAVASTATIN SODIUM 20 MG
20 TABLET ORAL DAILY
Qty: 90 TABLET | Refills: 1 | Status: SHIPPED | OUTPATIENT
Start: 2025-02-25

## 2025-02-25 RX ORDER — OMEPRAZOLE 40 MG/1
CAPSULE, DELAYED RELEASE ORAL
Qty: 90 CAPSULE | Refills: 1 | Status: SHIPPED | OUTPATIENT
Start: 2025-02-25

## 2025-02-25 RX ORDER — GABAPENTIN 300 MG/1
300 CAPSULE ORAL 3 TIMES DAILY PRN
Qty: 270 CAPSULE | Refills: 0 | Status: CANCELLED | OUTPATIENT
Start: 2025-02-25 | End: 2025-08-24

## 2025-02-25 RX ORDER — ZOLPIDEM TARTRATE 5 MG/1
5 TABLET ORAL NIGHTLY PRN
COMMUNITY
Start: 2025-02-19

## 2025-02-25 RX ORDER — TAMSULOSIN HYDROCHLORIDE 0.4 MG/1
CAPSULE ORAL
Qty: 180 CAPSULE | Refills: 1 | Status: SHIPPED | OUTPATIENT
Start: 2025-02-25

## 2025-02-25 ASSESSMENT — PATIENT HEALTH QUESTIONNAIRE - PHQ9
7. TROUBLE CONCENTRATING ON THINGS, SUCH AS READING THE NEWSPAPER OR WATCHING TELEVISION: NOT AT ALL
1. LITTLE INTEREST OR PLEASURE IN DOING THINGS: NOT AT ALL
4. FEELING TIRED OR HAVING LITTLE ENERGY: SEVERAL DAYS
6. FEELING BAD ABOUT YOURSELF - OR THAT YOU ARE A FAILURE OR HAVE LET YOURSELF OR YOUR FAMILY DOWN: NOT AT ALL
2. FEELING DOWN, DEPRESSED OR HOPELESS: NOT AT ALL
SUM OF ALL RESPONSES TO PHQ QUESTIONS 1-9: 1
9. THOUGHTS THAT YOU WOULD BE BETTER OFF DEAD, OR OF HURTING YOURSELF: NOT AT ALL
SUM OF ALL RESPONSES TO PHQ QUESTIONS 1-9: 1
10. IF YOU CHECKED OFF ANY PROBLEMS, HOW DIFFICULT HAVE THESE PROBLEMS MADE IT FOR YOU TO DO YOUR WORK, TAKE CARE OF THINGS AT HOME, OR GET ALONG WITH OTHER PEOPLE: NOT DIFFICULT AT ALL
SUM OF ALL RESPONSES TO PHQ9 QUESTIONS 1 & 2: 0
5. POOR APPETITE OR OVEREATING: NOT AT ALL
SUM OF ALL RESPONSES TO PHQ QUESTIONS 1-9: 1
SUM OF ALL RESPONSES TO PHQ QUESTIONS 1-9: 1
8. MOVING OR SPEAKING SO SLOWLY THAT OTHER PEOPLE COULD HAVE NOTICED. OR THE OPPOSITE, BEING SO FIGETY OR RESTLESS THAT YOU HAVE BEEN MOVING AROUND A LOT MORE THAN USUAL: NOT AT ALL
3. TROUBLE FALLING OR STAYING ASLEEP: NOT AT ALL

## 2025-02-25 NOTE — PROGRESS NOTES
[facility]  Office Progress Note - Dr. Kumar  2/25/25    CC:   Chief Complaint   Patient presents with    Medication Refill    Abdominal Pain     LLQ - sharp pains intermittently, associated with eating or drinking.  Last 2 months.        /64   Pulse 99   Temp 98.8 °F (37.1 °C) (Temporal)   Ht 1.854 m (6' 1\")   Wt 95.3 kg (210 lb)   SpO2 95%   BMI 27.71 kg/m²   Wt Readings from Last 3 Encounters:   02/25/25 95.3 kg (210 lb)   01/17/25 90.7 kg (200 lb)   01/14/25 92.1 kg (203 lb)       HPI:   KEDAR generated note:  History of Present Illness  The patient presents for evaluation of back pain, left lower abdominal pain, and prostate issues.    He reports a significant improvement in his back condition, attributing the progress to a recent injection. He estimates that he is nearly back to his normal state. His leg function has also improved, allowing him to ambulate without the aid of a walker or cane. The patient expresses a preference for non-surgical interventions and is considering traction therapy as a potential treatment option. He has discontinued his gabapentin regimen, which was previously prescribed at a dosage of 100 mg by another physician and later increased to 400 mg. He experienced withdrawal symptoms, primarily fatigue, following the cessation of gabapentin but these have since resolved. He does not believe he requires further gabapentin treatment. He notes that the medication was effective in managing his nerve pain but also induced excessive sleepiness.    He has been experiencing pain in the left lower quadrant of his abdomen for approximately 2 to 3 months. The pain is intermittent, with some days being more severe than others. He speculates that the pain may be due to a strain incurred while attempting to mobilize during his period of back pain. The pain is particularly noticeable when he consumes food or beverages. He reports no changes in bowel habits or the

## 2025-05-06 DIAGNOSIS — I10 ESSENTIAL HYPERTENSION: ICD-10-CM

## 2025-05-07 RX ORDER — AMLODIPINE BESYLATE 5 MG/1
5 TABLET ORAL DAILY
Qty: 90 TABLET | Refills: 1 | Status: SHIPPED | OUTPATIENT
Start: 2025-05-07

## 2025-05-07 NOTE — TELEPHONE ENCOUNTER
Name of Medication(s) Requested:  Requested Prescriptions     Pending Prescriptions Disp Refills    amLODIPine (NORVASC) 5 MG tablet [Pharmacy Med Name: amLODIPine Besylate 5 MG Oral Tablet] 90 tablet 1     Sig: Take 1 tablet by mouth once daily for blood pressure       Medication is on current medication list Yes    Dosage and directions were verified? Yes    Quantity verified: 90 day supply     Pharmacy Verified?  Yes    Last Appointment:  2/25/2025    Future appts:  Future Appointments   Date Time Provider Department Center   8/26/2025  1:00 PM Pablo Kumar MD [facility]        (If no appt send self scheduling link. .REFILLAPPT)  Scheduling request sent?     [] Yes  [x] No    Does patient need updated?  [] Yes  [x] No

## 2025-05-16 NOTE — PROGRESS NOTES
[id number] [facility] Otolaryngology  Dr. Evans Orr. Palmira Smith D.O. Ms.Ed        Patient Name:  Brianne Key  :  1960     CHIEF C/O:    Chief Complaint   Patient presents with    Other     1yr cerumen       HISTORY OBTAINED FROM:  patient    HISTORY OF PRESENT ILLNESS:       Elfego Alfaro is a 62y.o. year old male, here today for follow up of history to routine complaints, first being bilateral no cerumen impactions cleaned out under microscopic assistance every 6 months. Does state he is having increasing levels of hearing loss ear fullness and pressure bilaterally. Patient also complains of a separate antibiotic concerned with nasal congestion rhinitis and postnasal drainage. No current complaints of fever chills shortness of breath stridor nausea or chest pain at this point.       Past Medical History:   Diagnosis Date    Anxiety     Arthritis     Depression     GERD (gastroesophageal reflux disease)     Hepatitis C     HLD (hyperlipidemia) 2019    Hypertension     Thyroid disease      Past Surgical History:   Procedure Laterality Date    ABDOMEN SURGERY      PYLORIC STENOSIS    ANKLE FRACTURE SURGERY Right [id number]    ANKLE FRACTURE SURGERY Right 2015    CERVICAL FUSION  3/30/2016    acdf c5-c6    COLONOSCOPY      DENTAL SURGERY      tooth abscess    ENDOSCOPY, COLON, DIAGNOSTIC      EYE SURGERY      FRACTURE SURGERY      HARDWARE REMOVAL Right 7/10/2015    Right    HERNIA REPAIR      OTHER SURGICAL HISTORY  2016    fluiroscopy guided cervical myelogram with conpated tomography    REPAIR RETINAL TEAR/HOLE Bilateral     UPPER GASTROINTESTINAL ENDOSCOPY         Current Outpatient Medications:     azelastine (ASTELIN) 0.1 % nasal spray, 2 sprays by Nasal route 2 times daily Use in each nostril as directed, Disp: 30 mL, Rfl: 1    omeprazole (PRILOSEC) 40 MG delayed release capsule, Take 1 capsule by mouth every morning (before breakfast), Disp: 90 capsule, Rfl: 1    omeprazole Reason for call / Patient's question: Patient called requesting to speak to an RN because she is in excruciating pain, up at night crying, and surgery isn't until December. Patient disconnected call before transferring to RN.     Call back number: [phone]    Fax # (if applicable): n/a     Pharmacy (if applicable): n/a    Is follow up action required?: Yes      (PRILOSEC) 40 MG delayed release capsule, Take 1 capsule by mouth once daily, Disp: 30 capsule, Rfl: 0    amLODIPine (NORVASC) 5 MG tablet, Take 1 tablet by mouth once daily for blood pressure, Disp: 90 tablet, Rfl: 1    Handicap Placard MISC, He cannot walk without assistance from another person or the use of an assistance device (cane, crutch, prosthetic device, wheelchair, etc.). He has limited walking ability due to an arthritic and an orthopedic condition.  Duration: 5 years, Disp: 1 each, Rfl: 0    levothyroxine (SYNTHROID) 137 MCG tablet, TAKE 1 TABLET BY MOUTH ONCE DAILY, Disp: 90 tablet, Rfl: 3    Cholecalciferol (VITAMIN D3) 125 MCG (5000 UT) TABS, Take by mouth, Disp: , Rfl:     Omega-3 Fatty Acids (EQL OMEGA 3 FISH OIL) 1000 MG CAPS, Take by mouth, Disp: , Rfl:     pravastatin (PRAVACHOL) 20 MG tablet, TAKE 1 TABLET BY MOUTH ONCE DAILY, Disp: 90 tablet, Rfl: 3    tamsulosin (FLOMAX) 0.4 MG capsule, TAKE 2 CAPSULE BY MOUTH ONCE DAILY--(FOR ENLARGED PROSTATE)---, Disp: 180 capsule, Rfl: 3    triamcinolone (KENALOG) 0.025 % cream, Apply Topically, Disp: 1 Tube, Rfl: 1    diclofenac sodium (VOLTAREN) 1 % GEL, Apply 1 g topically 2 times daily Can be generic., Disp: 1 Tube, Rfl: 3    traZODone (DESYREL) 50 MG tablet, TAKE 1 TABLET BY MOUTH ONCE DAILY FOR 90 DAYS, Disp: , Rfl:     fluticasone (FLONASE) 50 MCG/ACT nasal spray, 1 spray by Nasal route daily, Disp: 3 Bottle, Rfl: 3    azelastine (ASTELIN) 0.1 % nasal spray, 1 spray by Nasal route 2 times daily 1 Spray in each nostril, Disp: 2 Bottle, Rfl: 3    clonazePAM (KLONOPIN) 2 MG tablet, TAKE 1 TABLET BY MOUTH THREE TIMES DAILY FOR 30 DAYS, Disp: , Rfl:     traZODone (DESYREL) 150 MG tablet, 300 mg , Disp: , Rfl:     FLUoxetine (PROZAC) 10 MG capsule, TAKE 1 CAPSULE BY MOUTH ONCE DAILY TAKE WITH 20 MG FLUOXETINE, Disp: , Rfl: 0    FLUoxetine (PROZAC) 20 MG capsule, TAKE 1 CAPSULE BY MOUTH ONCE DAILY, Disp: , Rfl: 0    Multiple Vitamin (MULTI-VITAMIN DAILY) TABS, Take by mouth, Disp: , Rfl:     lansoprazole (PREVACID) 15 MG delayed release capsule, TAKE 1 CAPSULE BY MOUTH ONCE DAILY, Disp: 90 capsule, Rfl: 1    nicotine (NICODERM CQ) 7 MG/24HR, Place 1 patch onto the skin every 24 hours, Disp: 30 patch, Rfl: 1    B Complex Vitamins (B COMPLEX PO), Take 1 tablet by mouth daily, Disp: , Rfl:   Patient has no known allergies. Social History     Tobacco Use    Smoking status: Current Some Day Smoker     Packs/day: 0.15     Years: 10.00     Pack years: 1.50     Types: Cigarettes    Smokeless tobacco: Former User    Tobacco comment: no smoking 24 hours before surgery   Vaping Use    Vaping Use: Never used   Substance Use Topics    Alcohol use: Yes     Alcohol/week: 0.0 standard drinks     Comment: occasional     Drug use: No     Family History   Problem Relation Age of Onset    High Blood Pressure Father     Heart Disease Father     High Blood Pressure Sister     High Blood Pressure Brother     High Blood Pressure Sister        Review of Systems   Constitutional: Negative for chills and fever. HENT: Negative for ear discharge and hearing loss. Respiratory: Negative for cough and shortness of breath. Cardiovascular: Negative for chest pain and palpitations. Gastrointestinal: Negative for vomiting. Skin: Negative for rash. Allergic/Immunologic: Negative for environmental allergies. Neurological: Negative for dizziness and headaches. Hematological: Does not bruise/bleed easily. All other systems reviewed and are negative. /82 (Site: Right Upper Arm, Position: Sitting, Cuff Size: Medium Adult)   Pulse 93   Ht 6' 1.5\" (1.867 m)   Wt 219 lb (99.3 kg)   BMI 28.50 kg/m²   Physical Exam  Vitals and nursing note reviewed. Constitutional:       Appearance: He is well-developed. HENT:      Head: Normocephalic and atraumatic. Right Ear: Tympanic membrane and ear canal normal. There is impacted cerumen. Left Ear: Tympanic membrane and ear canal normal. There is impacted cerumen. Nose: Congestion and rhinorrhea present. Mouth/Throat:      Mouth: Mucous membranes are moist.      Pharynx: Oropharynx is clear. Eyes:      Pupils: Pupils are equal, round, and reactive to light. Neck:      Thyroid: No thyromegaly. Trachea: No tracheal deviation. Cardiovascular:      Rate and Rhythm: Normal rate. Pulmonary:      Effort: Pulmonary effort is normal. No respiratory distress. Musculoskeletal:         General: Normal range of motion. Cervical back: Normal range of motion. Lymphadenopathy:      Cervical: No cervical adenopathy. Skin:     General: Skin is warm. Findings: No erythema. Neurological:      Mental Status: He is alert. Cranial Nerves: No cranial nerve deficit. Procedure: Cerumen Impaction    Pre-op: Cerumen Impaction    Post Op: Same    Procedure: Cerumen Impaction removal    Surgeon: Blayne Stockton    Procedure: Under microscopic assistance large cerumen impaction was removed using gentle suction and curette. TM intact B/L, Pt tolerated procedure well    Complications: None    Blood Loss Minimial        IMPRESSION/PLAN:  Seen and examined, recommendations offered patient continue current medical therapy, will initiate Astelin therapy for chronic rhinitis and congestion    Patient also a 1 cm proximal to lower complication, follow in 6 months. Dr. Laura Manriquez.  Otolaryngology Facial Plastic Surgery  :  [facility] Otolaryngology/Facial Plastic Surgery Residency  Associate Clinical Professor:  Opal Corcoran [facility]

## 2025-06-19 DIAGNOSIS — E03.4 HYPOTHYROIDISM DUE TO ACQUIRED ATROPHY OF THYROID: ICD-10-CM

## 2025-06-19 NOTE — TELEPHONE ENCOUNTER
Name of Medication(s) Requested:  Requested Prescriptions     Pending Prescriptions Disp Refills    levothyroxine (SYNTHROID) 150 MCG tablet [Pharmacy Med Name: Levothyroxine Sodium 150 MCG Oral Tablet] 90 tablet 1     Sig: Take 1 tablet by mouth once daily       Medication is on current medication list Yes    Dosage and directions were verified? Yes    Quantity verified: 90 day supply     Pharmacy Verified?  Yes    Last Appointment:  2/25/2025    Future appts:  Future Appointments   Date Time Provider Department Center   8/26/2025  1:00 PM Pablo Kumar MD [facility]        (If no appt send self scheduling link. .[email or web address])  Scheduling request sent?     [] Yes  [x] No    Does patient need updated?  [] Yes  [x] No

## 2025-06-20 RX ORDER — LEVOTHYROXINE SODIUM 150 UG/1
150 TABLET ORAL DAILY
Qty: 90 TABLET | Refills: 1 | Status: SHIPPED | OUTPATIENT
Start: 2025-06-20

## 2025-08-26 ENCOUNTER — OFFICE VISIT (OUTPATIENT)
Dept: FAMILY MEDICINE CLINIC | Age: 65
End: 2025-08-26

## 2025-08-26 VITALS
HEIGHT: 73 IN | HEART RATE: 98 BPM | WEIGHT: 208 LBS | OXYGEN SATURATION: 95 % | SYSTOLIC BLOOD PRESSURE: 128 MMHG | DIASTOLIC BLOOD PRESSURE: 82 MMHG | TEMPERATURE: 98.6 F | BODY MASS INDEX: 27.57 KG/M2

## 2025-08-26 DIAGNOSIS — E03.9 HYPOTHYROIDISM, UNSPECIFIED TYPE: ICD-10-CM

## 2025-08-26 DIAGNOSIS — G62.9 PERIPHERAL POLYNEUROPATHY: ICD-10-CM

## 2025-08-26 DIAGNOSIS — Z12.11 COLON CANCER SCREENING: ICD-10-CM

## 2025-08-26 DIAGNOSIS — H91.93 DECREASED HEARING OF BOTH EARS: ICD-10-CM

## 2025-08-26 DIAGNOSIS — N40.1 BENIGN PROSTATIC HYPERPLASIA WITH POST-VOID DRIBBLING: ICD-10-CM

## 2025-08-26 DIAGNOSIS — E78.5 HYPERLIPIDEMIA, UNSPECIFIED HYPERLIPIDEMIA TYPE: ICD-10-CM

## 2025-08-26 DIAGNOSIS — N39.43 BENIGN PROSTATIC HYPERPLASIA WITH POST-VOID DRIBBLING: ICD-10-CM

## 2025-08-26 DIAGNOSIS — Z00.00 MEDICARE ANNUAL WELLNESS VISIT, SUBSEQUENT: Primary | ICD-10-CM

## 2025-08-26 LAB
ALBUMIN: 4 G/DL (ref 3.5–5.2)
ALP BLD-CCNC: 108 U/L (ref 40–129)
ALT SERPL-CCNC: 15 U/L (ref 0–50)
ANION GAP SERPL CALCULATED.3IONS-SCNC: 13 MMOL/L (ref 7–16)
AST SERPL-CCNC: 27 U/L (ref 0–50)
BASOPHILS ABSOLUTE: 0.05 K/UL (ref 0–0.2)
BASOPHILS RELATIVE PERCENT: 1 % (ref 0–2)
BILIRUB SERPL-MCNC: 0.4 MG/DL (ref 0–1.2)
BUN BLDV-MCNC: 17 MG/DL (ref 8–23)
CALCIUM SERPL-MCNC: 9 MG/DL (ref 8.8–10.2)
CHLORIDE BLD-SCNC: 107 MMOL/L (ref 98–107)
CHOLESTEROL, TOTAL: 156 MG/DL
CO2: 21 MMOL/L (ref 22–29)
CREAT SERPL-MCNC: 0.8 MG/DL (ref 0.7–1.2)
EOSINOPHILS ABSOLUTE: 0.09 K/UL (ref 0.05–0.5)
EOSINOPHILS RELATIVE PERCENT: 1 % (ref 0–6)
FOLATE: >40 NG/ML (ref 4.6–34.8)
GFR, ESTIMATED: >90 ML/MIN/1.73M2
GLUCOSE BLD-MCNC: 111 MG/DL (ref 74–99)
HCT VFR BLD CALC: 42.2 % (ref 37–54)
HDLC SERPL-MCNC: 63 MG/DL
HEMOGLOBIN: 14.3 G/DL (ref 12.5–16.5)
IMMATURE GRANULOCYTES %: 0 % (ref 0–5)
IMMATURE GRANULOCYTES ABSOLUTE: <0.03 K/UL (ref 0–0.58)
LDL CHOLESTEROL: 75 MG/DL
LYMPHOCYTES ABSOLUTE: 1.58 K/UL (ref 1.5–4)
LYMPHOCYTES RELATIVE PERCENT: 23 % (ref 20–42)
MCH RBC QN AUTO: 29.2 PG (ref 26–35)
MCHC RBC AUTO-ENTMCNC: 33.9 G/DL (ref 32–34.5)
MCV RBC AUTO: 86.1 FL (ref 80–99.9)
MONOCYTES ABSOLUTE: 0.55 K/UL (ref 0.1–0.95)
MONOCYTES RELATIVE PERCENT: 8 % (ref 2–12)
NEUTROPHILS ABSOLUTE: 4.72 K/UL (ref 1.8–7.3)
NEUTROPHILS RELATIVE PERCENT: 67 % (ref 43–80)
PDW BLD-RTO: 14.1 % (ref 11.5–15)
PLATELET # BLD: 177 K/UL (ref 130–450)
PMV BLD AUTO: 12.4 FL (ref 7–12)
POTASSIUM SERPL-SCNC: 4 MMOL/L (ref 3.5–5.1)
RBC # BLD: 4.9 M/UL (ref 3.8–5.8)
SODIUM BLD-SCNC: 140 MMOL/L (ref 136–145)
TOTAL PROTEIN: 6.7 G/DL (ref 6.4–8.3)
TRIGL SERPL-MCNC: 92 MG/DL
TSH SERPL DL<=0.05 MIU/L-ACNC: 1.07 UIU/ML (ref 0.27–4.2)
VITAMIN B-12: 760 PG/ML (ref 232–1245)
VLDLC SERPL CALC-MCNC: 18 MG/DL
WBC # BLD: 7 K/UL (ref 4.5–11.5)

## 2025-08-26 RX ORDER — FLUOXETINE HYDROCHLORIDE 40 MG/1
40 CAPSULE ORAL DAILY
COMMUNITY
Start: 2025-05-30

## 2025-08-26 RX ORDER — PERPHENAZINE 16 MG
1200 TABLET ORAL DAILY
Qty: 180 CAPSULE | Refills: 1 | Status: SHIPPED | OUTPATIENT
Start: 2025-08-26

## 2025-08-26 RX ORDER — OMEPRAZOLE 40 MG/1
CAPSULE, DELAYED RELEASE ORAL
Qty: 90 CAPSULE | Refills: 1 | Status: SHIPPED | OUTPATIENT
Start: 2025-08-26

## 2025-08-26 RX ORDER — PRAVASTATIN SODIUM 20 MG
20 TABLET ORAL DAILY
Qty: 90 TABLET | Refills: 1 | Status: SHIPPED | OUTPATIENT
Start: 2025-08-26

## 2025-08-26 RX ORDER — TAMSULOSIN HYDROCHLORIDE 0.4 MG/1
CAPSULE ORAL
Qty: 180 CAPSULE | Refills: 1 | Status: SHIPPED | OUTPATIENT
Start: 2025-08-26

## 2025-08-26 ASSESSMENT — PATIENT HEALTH QUESTIONNAIRE - PHQ9
2. FEELING DOWN, DEPRESSED OR HOPELESS: NOT AT ALL
5. POOR APPETITE OR OVEREATING: NOT AT ALL
SUM OF ALL RESPONSES TO PHQ QUESTIONS 1-9: 2
7. TROUBLE CONCENTRATING ON THINGS, SUCH AS READING THE NEWSPAPER OR WATCHING TELEVISION: NOT AT ALL
8. MOVING OR SPEAKING SO SLOWLY THAT OTHER PEOPLE COULD HAVE NOTICED. OR THE OPPOSITE, BEING SO FIGETY OR RESTLESS THAT YOU HAVE BEEN MOVING AROUND A LOT MORE THAN USUAL: SEVERAL DAYS
SUM OF ALL RESPONSES TO PHQ QUESTIONS 1-9: 2
6. FEELING BAD ABOUT YOURSELF - OR THAT YOU ARE A FAILURE OR HAVE LET YOURSELF OR YOUR FAMILY DOWN: NOT AT ALL
SUM OF ALL RESPONSES TO PHQ QUESTIONS 1-9: 2
10. IF YOU CHECKED OFF ANY PROBLEMS, HOW DIFFICULT HAVE THESE PROBLEMS MADE IT FOR YOU TO DO YOUR WORK, TAKE CARE OF THINGS AT HOME, OR GET ALONG WITH OTHER PEOPLE: NOT DIFFICULT AT ALL
1. LITTLE INTEREST OR PLEASURE IN DOING THINGS: NOT AT ALL
SUM OF ALL RESPONSES TO PHQ QUESTIONS 1-9: 2
9. THOUGHTS THAT YOU WOULD BE BETTER OFF DEAD, OR OF HURTING YOURSELF: NOT AT ALL
4. FEELING TIRED OR HAVING LITTLE ENERGY: SEVERAL DAYS
3. TROUBLE FALLING OR STAYING ASLEEP: NOT AT ALL

## 2025-08-26 ASSESSMENT — LIFESTYLE VARIABLES
HOW MANY STANDARD DRINKS CONTAINING ALCOHOL DO YOU HAVE ON A TYPICAL DAY: 1 OR 2
HOW OFTEN DO YOU HAVE A DRINK CONTAINING ALCOHOL: MONTHLY OR LESS

## (undated) DEVICE — Device: Brand: PORTEX

## (undated) DEVICE — 6 ML SYRINGE LUER-LOCK TIP: Brand: MONOJECT

## (undated) DEVICE — NON-DEHP CATHETER EXTENSION SET, MALE LUER LOCK ADAPTER

## (undated) DEVICE — BANDAGE ADH W1XL3IN NAT FAB WVN FLX DURABLE N ADH PD SEAL

## (undated) DEVICE — NEEDLE HYPO 25GA L1.5IN BLU POLYPR HUB S STL REG BVL STR

## (undated) DEVICE — 3M™ RED DOT™ MONITORING ELECTRODE WITH FOAM TAPE AND STICKY GEL 2560, 50/BAG, 20/CASE, 72/PLT: Brand: RED DOT™

## (undated) DEVICE — NEEDLE HYPO 18GA L1.5IN PNK POLYPR HUB S STL REG BVL STR

## (undated) DEVICE — SYRINGE MED 5ML STD CLR PLAS LUERLOCK TIP N CTRL DISP

## (undated) DEVICE — 12 ML SYRINGE,LUER-LOCK TIP: Brand: MONOJECT

## (undated) DEVICE — GAUZE,SPONGE,4"X4",8PLY,STRL,LF,10/TRAY: Brand: MEDLINE

## (undated) DEVICE — GLOVE ORANGE PI 7 1/2   MSG9075